# Patient Record
Sex: MALE | Race: WHITE | NOT HISPANIC OR LATINO | Employment: FULL TIME | URBAN - METROPOLITAN AREA
[De-identification: names, ages, dates, MRNs, and addresses within clinical notes are randomized per-mention and may not be internally consistent; named-entity substitution may affect disease eponyms.]

---

## 2017-01-10 ENCOUNTER — PROCEDURE VISIT (OUTPATIENT)
Dept: DERMATOLOGY | Facility: CLINIC | Age: 57
End: 2017-01-10
Payer: COMMERCIAL

## 2017-01-10 VITALS
HEART RATE: 74 BPM | BODY MASS INDEX: 28.93 KG/M2 | WEIGHT: 180 LBS | SYSTOLIC BLOOD PRESSURE: 136 MMHG | DIASTOLIC BLOOD PRESSURE: 85 MMHG | HEIGHT: 66 IN

## 2017-01-10 DIAGNOSIS — C44.311 BASAL CELL CARCINOMA OF NOSE: Primary | ICD-10-CM

## 2017-01-10 PROCEDURE — 17311 MOHS 1 STAGE H/N/HF/G: CPT | Mod: S$GLB,,, | Performed by: DERMATOLOGY

## 2017-01-10 PROCEDURE — 99499 UNLISTED E&M SERVICE: CPT | Mod: S$GLB,,, | Performed by: DERMATOLOGY

## 2017-01-10 PROCEDURE — 17312 MOHS ADDL STAGE: CPT | Mod: S$GLB,,, | Performed by: DERMATOLOGY

## 2017-01-10 NOTE — PROGRESS NOTES
ALLERGIES:  Review of patient's allergies indicates no known allergies.  -------------------------------------------------------------  PROCEDURE: Mohs' Micrographic Surgery    SITE: left ala    INDICATION: basal cell carcinoma in an area at increased risk of recurrence    CASE NUMBER: [id number]      ANESTHETIC: 4 mL 1% Lidocaine with Epinephrine 1:100,000, buffered    SURGICAL PREP: Ethanol and Betadine    SURGEON: Hemanth Armenta MD    ASSISTANTS: Tomasa Vincent CST     PREOPERATIVE DIAGNOSIS: basal cell carcinoma    POSTOPERATIVE DIAGNOSIS: basal cell carcinoma    PATHOLOGIC DIAGNOSIS: basal cell carcinoma    STAGES OF MOHS' SURGERY PERFORMED: two    TUMOR-FREE PLANE ACHIEVED: yes    HEMOSTASIS: Hyfrecation     SPECIMENS: two (one in stage A, one in stage B)    INITIAL LESION SIZE: 0.6 x 0.6 cm    FINAL DEFECT SIZE: 0.6 x 0.8 cm    WOUND REPAIR/DISPOSITION: see below    NARRATIVE:    The patient is a 56 y.o.male referred by Yocasta Nava MD with a history of cancer on the left nasal ala which was biopsied - pathology accession #[id number], [facility]. Findings revealed basal cell carcinoma. Examination revealed a sclerotic plaque on the left nasal ala at the site of prior biopsy, which was confirmed by reference to the photograph taken at the previous patient visit. In light of the nature of this tumor and the location on the nose, Mohs' micrographic surgery was thought to be the most appropriate management choice, and this diagnosis is appropriate for treatment by Mohs' micrographic surgery.  I discussed it with the patient and he fully understands the aims, risks, alternatives, and possible complications, and elects to proceed.  There are no medical or surgical contraindications to the procedure.     A signed informed consent was obtained.    PROCEDURE:  The patient was placed in the semi-recumbent position on the operating table in the Mohs' Surgery Suite. The area in question was thoroughly  "prepped with ethanol and Betadine. A sterile surgical marker was used to outline the clinically apparent margins of the involved area, and a narrow margin of normal-appearing skin. Reference marks were made at the periphery of the outlined area with the surgical marker. The proposed area of excision was measured and photographed. Local anesthesia of 1% Lidocaine with 1:100,000 epinephrine, buffered with sodium bicarbonate, was administered.  The total volume of anesthetic used throughout this portion of the procedure was as documented above. The area was prepared and draped in the standard manner. All of the grossly identifiable area of clinically abnormal tissue and an underlying/peripheral layer was taken and processed by the Mohs' technique.  Hemostasis was obtained with the hyfrecator. Tissue was taken from any areas of residual marginal involvement (if present) and processed by the Mohs' technique in as many stages as needed until a tumor-free plane was achieved.    Colors of inks used in the reference nicks at epidermal margins (if present) and/or inking of non-epithelial edges, if applicable, is represented on the Mohs map as follows: solid lines represent red ink, dots represent blue ink, jagged lines represent black ink, curlicues represent green ink, "xxx" represents yellow ink.    The first Mohs' layer consisted of one section(s) with 4 slide(s) evaluated. Some residual tumor was noted at the margins of the first Mohs' layer. Histology of the specimen(s) showed residual basal cell carcinoma in the deep margin between the green and red nicks, and adjacent to the red nick, as noted on the Mohs map. Missing skin edge was also noted adjacent to the red nick.     The second Mohs' layer consisted of one section(s) with 4 slide(s) evaluated. No residual tumor was noted at the margins of the second Mohs' layer. Histology of the specimen(s) showed chronic solar damage.    A total of one section(s) and 8 slide(s) " were examined under the microscope via the Mohs technique.  A cancer free plane was reached after layer number two. Defect final size was as noted above.     The wound was covered with a nonadherent dressing between stages, and the patient allowed to wait in the waiting area during these periods. The final defect was photographed at the completion of the Mohs' procedure.    The patient was returned to the procedure room following completion of the Mohs' procedure and final slide review. After reviewing the risk and benefits of the alternatives for management of the defect, the patient and I have decided to allow the site to heal by secondary intention.    Final dressing consisted of petrolatum, Telfa and tape.    Estimated blood loss for the total procedure was less than 5 mL.    Total operative time including tissue processing in the Mohs' laboratory and microscopic Mohs' frozen section slide review was 2 hour(s). Verbal and written wound care instructions were given to the patient, and he expressed understanding of these instructions. The patient tolerated the procedure well and left the operating room in good condition; he is to return in 2 weeks for followup.     Dr. Armenta's cell phone number was given to the patient with instructions to call prn with any problems.

## 2017-01-27 ENCOUNTER — OFFICE VISIT (OUTPATIENT)
Dept: DERMATOLOGY | Facility: CLINIC | Age: 57
End: 2017-01-27
Payer: COMMERCIAL

## 2017-01-27 DIAGNOSIS — Z98.890 HISTORY OF MOH'S MICROGRAPHIC SURGERY FOR SKIN CANCER: Primary | ICD-10-CM

## 2017-01-27 DIAGNOSIS — Z85.828 HISTORY OF MOH'S MICROGRAPHIC SURGERY FOR SKIN CANCER: Primary | ICD-10-CM

## 2017-01-27 PROCEDURE — 99024 POSTOP FOLLOW-UP VISIT: CPT | Mod: S$GLB,,, | Performed by: DERMATOLOGY

## 2017-01-27 PROCEDURE — 99999 PR PBB SHADOW E&M-EST. PATIENT-LVL II: CPT | Mod: PBBFAC,,, | Performed by: DERMATOLOGY

## 2017-01-27 NOTE — PROGRESS NOTES
CC: 56 y.o.male patient is here for followup     HPI: Patient is about 2 week(s) s/p Mohs' micrographic surgery, fresh tissue technique, of a basal cell carcinoma on the left nasal ala; with healing via secondary intention  Patient reports no problems    EXAM: Wound appears to be healing well. Base shows good granulation.  No undue erythema to surrounding skin or signs or symptoms of infection.    IMPRESSION:  Healing well post Mohs' micrographic surgery via secondary intention    PLAN:  Site cleaned with peroxide  Dressed with petrolatum  and Telfa and tape  Continue current care  Reviewed anticipated course  Followup 6 weeks if desired; call prn sooner

## 2017-03-21 ENCOUNTER — OFFICE VISIT (OUTPATIENT)
Dept: DERMATOLOGY | Facility: CLINIC | Age: 57
End: 2017-03-21
Payer: COMMERCIAL

## 2017-03-21 DIAGNOSIS — Z85.828 HISTORY OF MOH'S MICROGRAPHIC SURGERY FOR SKIN CANCER: Primary | ICD-10-CM

## 2017-03-21 DIAGNOSIS — Z98.890 HISTORY OF MOH'S MICROGRAPHIC SURGERY FOR SKIN CANCER: Primary | ICD-10-CM

## 2017-03-21 PROCEDURE — 99999 PR PBB SHADOW E&M-EST. PATIENT-LVL II: CPT | Mod: PBBFAC,,, | Performed by: DERMATOLOGY

## 2017-03-21 PROCEDURE — 99024 POSTOP FOLLOW-UP VISIT: CPT | Mod: S$GLB,,, | Performed by: DERMATOLOGY

## 2017-08-28 RX ORDER — ACYCLOVIR 400 MG/1
400 TABLET ORAL 2 TIMES DAILY
Qty: 60 TABLET | Refills: 9 | Status: SHIPPED | OUTPATIENT
Start: 2017-08-28 | End: 2018-08-12 | Stop reason: SDUPTHER

## 2017-11-15 ENCOUNTER — OFFICE VISIT (OUTPATIENT)
Dept: FAMILY MEDICINE | Facility: CLINIC | Age: 57
End: 2017-11-15
Payer: COMMERCIAL

## 2017-11-15 VITALS
HEART RATE: 60 BPM | WEIGHT: 191.81 LBS | OXYGEN SATURATION: 97 % | SYSTOLIC BLOOD PRESSURE: 118 MMHG | DIASTOLIC BLOOD PRESSURE: 90 MMHG | BODY MASS INDEX: 30.82 KG/M2 | HEIGHT: 66 IN

## 2017-11-15 DIAGNOSIS — M25.561 PAIN IN BOTH KNEES, UNSPECIFIED CHRONICITY: ICD-10-CM

## 2017-11-15 DIAGNOSIS — N52.9 ERECTILE DYSFUNCTION, UNSPECIFIED ERECTILE DYSFUNCTION TYPE: ICD-10-CM

## 2017-11-15 DIAGNOSIS — M25.511 CHRONIC PAIN OF BOTH SHOULDERS: ICD-10-CM

## 2017-11-15 DIAGNOSIS — M25.562 PAIN IN BOTH KNEES, UNSPECIFIED CHRONICITY: ICD-10-CM

## 2017-11-15 DIAGNOSIS — Z00.00 PREVENTATIVE HEALTH CARE: Primary | ICD-10-CM

## 2017-11-15 DIAGNOSIS — G89.29 CHRONIC PAIN OF BOTH SHOULDERS: ICD-10-CM

## 2017-11-15 DIAGNOSIS — M25.512 CHRONIC PAIN OF BOTH SHOULDERS: ICD-10-CM

## 2017-11-15 PROCEDURE — 90686 IIV4 VACC NO PRSV 0.5 ML IM: CPT | Mod: S$GLB,,, | Performed by: FAMILY MEDICINE

## 2017-11-15 PROCEDURE — 90471 IMMUNIZATION ADMIN: CPT | Mod: S$GLB,,, | Performed by: FAMILY MEDICINE

## 2017-11-15 PROCEDURE — 99396 PREV VISIT EST AGE 40-64: CPT | Mod: 25,S$GLB,, | Performed by: FAMILY MEDICINE

## 2017-11-15 PROCEDURE — 99999 PR PBB SHADOW E&M-EST. PATIENT-LVL III: CPT | Mod: PBBFAC,,, | Performed by: FAMILY MEDICINE

## 2017-11-15 RX ORDER — SILDENAFIL CITRATE 20 MG/1
20 TABLET ORAL 3 TIMES DAILY
Qty: 30 TABLET | Refills: 5 | Status: SHIPPED | OUTPATIENT
Start: 2017-11-15 | End: 2018-08-29 | Stop reason: SDUPTHER

## 2017-11-15 NOTE — PROGRESS NOTES
Subjective:       Patient ID: Efra Corey is a 57 y.o. male.    Chief Complaint: Preventative health care    Here for routine physical.    C/o bilateral shoulder and knee pain.  Shoulders interfere with sleep.  Shoulder are worse with rotation and abduction    Past Medical History:    Colon polyp - colonoscopy                                                   Past Surgical History:    KNEE ARTHROSCOPY W/ ACL RECONSTRUCTION                       Comment:left; Dr. Rock Alanis    MOUTH SURGERY                                                  No Known Allergies    Social History    Marital Status: Single              Spouse Name:                       Years of Education:                 Number of children: 2             Occupational History  Occupation          Employer            Comment               [employer]                         Social History Main Topics    Smoking Status: Never Smoker                      Smokeless Status: Never Used                        Alcohol Use: Yes                Comment: social    Drug Use: No              Sexual Activity: Not on file          Other Topics            Concern    None on file    Social History Narrative    : wife  of breast cancer      Exercise: regular      From [place], [place]    Current Outpatient Prescriptions on File Prior to Visit:  sildenafil (VIAGRA) 100 MG tablet, Take 1 tablet (100 mg total) by mouth daily as needed for Erectile Dysfunction., Disp: 10 tablet, Rfl: 11  valacyclovir (VALTREX) 500 MG tablet, Take 1 tablet (500 mg total) by mouth once daily., Disp: 30 tablet, Rfl: 11  (DISCONTINUED) meloxicam (MOBIC) 15 MG tablet, Take 1 tablet (15 mg total) by mouth once daily., Disp: 30 tablet, Rfl: 1    No current facility-administered medications on file prior to visit.     No family history on file.                Review of Systems   Constitutional: Negative for appetite change, chills, fever and unexpected weight change.   HENT:  "Negative for sore throat and trouble swallowing.    Eyes: Negative for pain and visual disturbance.   Respiratory: Positive for cough (dry cough for 3 weeks; initially with some fever, rhinorrhea, sneezing). Negative for chest tightness, shortness of breath and wheezing.    Cardiovascular: Negative for chest pain, palpitations and leg swelling.   Gastrointestinal: Negative for abdominal pain, blood in stool, constipation, diarrhea and nausea.   Genitourinary: Negative for difficulty urinating, dysuria and hematuria.   Musculoskeletal: Positive for arthralgias (shoulders and knees). Negative for gait problem and neck pain.   Skin: Negative for rash and wound.   Neurological: Negative for dizziness, weakness, light-headedness and headaches.   Hematological: Negative for adenopathy.   Psychiatric/Behavioral: Negative for dysphoric mood.       Objective:       BP (!) 118/90   Pulse 60   Ht 5' 6" (1.676 m)   Wt 87 kg (191 lb 12.8 oz)   SpO2 97%   BMI 30.96 kg/m²     Physical Exam   Constitutional: He is oriented to person, place, and time. He appears well-developed and well-nourished. He is active. No distress.   HENT:   Head: Normocephalic and atraumatic.   Right Ear: External ear normal.   Left Ear: External ear normal.   Mouth/Throat: Uvula is midline, oropharynx is clear and moist and mucous membranes are normal. No oropharyngeal exudate.   Eyes: Conjunctivae, EOM and lids are normal. Pupils are equal, round, and reactive to light.   Neck: Normal range of motion, full passive range of motion without pain and phonation normal. Neck supple. No thyroid mass and no thyromegaly present.   Cardiovascular: Normal rate, regular rhythm, normal heart sounds and intact distal pulses.  Exam reveals no gallop and no friction rub.    No murmur heard.  Pulmonary/Chest: Effort normal and breath sounds normal. No respiratory distress. He has no wheezes. He has no rales.   Abdominal: Soft. Bowel sounds are normal. He exhibits no " distension and no mass. There is no tenderness. There is no rebound and no guarding.   Musculoskeletal:        Right shoulder: He exhibits decreased range of motion and crepitus. He exhibits no tenderness, no bony tenderness and normal strength.        Left shoulder: He exhibits decreased range of motion and crepitus. He exhibits no tenderness, no bony tenderness and normal strength.        Right knee: He exhibits abnormal patellar mobility (some crepitus). He exhibits normal range of motion.        Left knee: He exhibits decreased range of motion (limited extension). Tenderness found. Medial joint line and lateral joint line tenderness noted.   Lymphadenopathy:     He has no cervical adenopathy.   Neurological: He is alert and oriented to person, place, and time. No cranial nerve deficit. Coordination normal.   Skin: Skin is warm and dry.   Psychiatric: He has a normal mood and affect. His speech is normal and behavior is normal. Judgment and thought content normal.       Results for orders placed or performed in visit on 08/18/16   Comprehensive metabolic panel   Result Value Ref Range    Sodium 142 136 - 145 mmol/L    Potassium 4.3 3.5 - 5.1 mmol/L    Chloride 108 95 - 110 mmol/L    CO2 27 23 - 29 mmol/L    Glucose 87 70 - 110 mg/dL    BUN, Bld 20 6 - 20 mg/dL    Creatinine 1.4 0.5 - 1.4 mg/dL    Calcium 9.5 8.7 - 10.5 mg/dL    Total Protein 7.5 6.0 - 8.4 g/dL    Albumin 4.3 3.5 - 5.2 g/dL    Total Bilirubin 0.6 0.1 - 1.0 mg/dL    Alkaline Phosphatase 75 55 - 135 U/L    AST 23 10 - 40 U/L    ALT 23 10 - 44 U/L    Anion Gap 7 (L) 8 - 16 mmol/L    eGFR if African American >60.0 >60 mL/min/1.73 m^2    eGFR if non  56.2 (A) >60 mL/min/1.73 m^2   Lipid panel   Result Value Ref Range    Cholesterol 183 120 - 199 mg/dL    Triglycerides 75 30 - 150 mg/dL    HDL 47 40 - 75 mg/dL    LDL Cholesterol 121.0 63.0 - 159.0 mg/dL    HDL/Chol Ratio 25.7 20.0 - 50.0 %    Total Cholesterol/HDL Ratio 3.9 2.0 - 5.0     Non-HDL Cholesterol 136 mg/dL   PSA, Screening   Result Value Ref Range    PSA, SCREEN 0.90 0.00 - 4.00 ng/mL       Assessment:       1. Preventative health care    2. Chronic pain of both shoulders    3. Pain in both knees, unspecified chronicity    4. Erectile dysfunction, unspecified erectile dysfunction type        Plan:       Preventative health care  -     Comprehensive metabolic panel; Future; Expected date: 11/15/2017  -     Lipid panel; Future; Expected date: 11/15/2017  -     PSA, Screening; Future; Expected date: 11/15/2017    Chronic pain of both shoulders  -     Ambulatory referral to Orthopedics    Pain in both knees, unspecified chronicity  -     Ambulatory referral to Orthopedics    Erectile dysfunction, unspecified erectile dysfunction type  -     sildenafil (REVATIO) 20 mg Tab; Take 1 tablet (20 mg total) by mouth 3 (three) times daily.  Dispense: 30 tablet; Refill: 5    will get xrays and refer to ortho  He may benefit from PT, but he would like to talk to ortho first  Counseled on pendulum exercises for shoulders and basic knee stretching.  Counseled on regular exercise, maintenance of a healthy weight, balanced diet rich in fruits/vegetables and lean protein, and avoidance of unhealthy habits like smoking and excessive alcohol intake.

## 2017-11-20 ENCOUNTER — HOSPITAL ENCOUNTER (OUTPATIENT)
Dept: RADIOLOGY | Facility: HOSPITAL | Age: 57
Discharge: HOME | End: 2017-11-20
Attending: FAMILY MEDICINE
Payer: COMMERCIAL

## 2017-11-20 DIAGNOSIS — M25.561 PAIN IN BOTH KNEES, UNSPECIFIED CHRONICITY: ICD-10-CM

## 2017-11-20 DIAGNOSIS — M25.511 CHRONIC PAIN OF BOTH SHOULDERS: ICD-10-CM

## 2017-11-20 DIAGNOSIS — M25.562 PAIN IN BOTH KNEES, UNSPECIFIED CHRONICITY: ICD-10-CM

## 2017-11-20 DIAGNOSIS — M25.512 CHRONIC PAIN OF BOTH SHOULDERS: ICD-10-CM

## 2017-11-20 DIAGNOSIS — G89.29 CHRONIC PAIN OF BOTH SHOULDERS: ICD-10-CM

## 2017-11-20 PROCEDURE — 73030 X-RAY EXAM OF SHOULDER: CPT | Mod: 26,LT,, | Performed by: RADIOLOGY

## 2017-11-20 PROCEDURE — 73562 X-RAY EXAM OF KNEE 3: CPT | Mod: 26,59,RT, | Performed by: RADIOLOGY

## 2017-11-20 PROCEDURE — 73562 X-RAY EXAM OF KNEE 3: CPT | Mod: 26,LT,, | Performed by: RADIOLOGY

## 2017-11-20 PROCEDURE — 73030 X-RAY EXAM OF SHOULDER: CPT | Mod: 26,59,RT, | Performed by: RADIOLOGY

## 2017-11-20 PROCEDURE — 73030 X-RAY EXAM OF SHOULDER: CPT | Mod: TC,50,PO

## 2017-11-20 PROCEDURE — 73562 X-RAY EXAM OF KNEE 3: CPT | Mod: TC,50,PO

## 2017-12-11 ENCOUNTER — TELEPHONE (OUTPATIENT)
Dept: FAMILY MEDICINE | Facility: CLINIC | Age: 57
End: 2017-12-11

## 2017-12-11 NOTE — TELEPHONE ENCOUNTER
----- Message from Mary Cabrera sent at 12/11/2017  4:00 PM CST -----  Contact: pt  Pt calling states would the results of labs and xray done on 11/20,please.[phone]

## 2017-12-12 NOTE — TELEPHONE ENCOUNTER
----- Message from Dane Talamantes sent at 12/11/2017  4:26 PM CST -----  Contact: Tristentient  Patient states that he is returning the call and to please call him back [phone].  Thank you

## 2017-12-12 NOTE — TELEPHONE ENCOUNTER
Pt given results of Xray. Pt will sched an appt w/ Dr Alanis . Pt requesting copy of labs be mailed to him. Copies mailed.--lp

## 2018-04-03 ENCOUNTER — OFFICE VISIT (OUTPATIENT)
Dept: DERMATOLOGY | Facility: CLINIC | Age: 58
End: 2018-04-03
Payer: COMMERCIAL

## 2018-04-03 VITALS — WEIGHT: 191 LBS | RESPIRATION RATE: 16 BRPM | BODY MASS INDEX: 30.7 KG/M2 | HEIGHT: 66 IN

## 2018-04-03 DIAGNOSIS — Z85.828 PERSONAL HISTORY OF OTHER MALIGNANT NEOPLASM OF SKIN: Primary | ICD-10-CM

## 2018-04-03 DIAGNOSIS — L82.1 SEBORRHEIC KERATOSIS: ICD-10-CM

## 2018-04-03 DIAGNOSIS — Z12.83 SKIN CANCER SCREENING: ICD-10-CM

## 2018-04-03 DIAGNOSIS — D48.5 NEOPLASM OF UNCERTAIN BEHAVIOR OF SKIN: ICD-10-CM

## 2018-04-03 DIAGNOSIS — L57.0 ACTINIC KERATOSES: ICD-10-CM

## 2018-04-03 PROCEDURE — 11101 PR BIOPSY, EACH ADDED LESION: CPT | Mod: S$GLB,,, | Performed by: DERMATOLOGY

## 2018-04-03 PROCEDURE — 11100 PR BIOPSY OF SKIN LESION: CPT | Mod: 59,S$GLB,, | Performed by: DERMATOLOGY

## 2018-04-03 PROCEDURE — 88305 TISSUE EXAM BY PATHOLOGIST: CPT | Mod: 59 | Performed by: PATHOLOGY

## 2018-04-03 PROCEDURE — 99999 PR PBB SHADOW E&M-EST. PATIENT-LVL III: CPT | Mod: PBBFAC,,, | Performed by: DERMATOLOGY

## 2018-04-03 PROCEDURE — 88305 TISSUE EXAM BY PATHOLOGIST: CPT | Mod: 26,,, | Performed by: PATHOLOGY

## 2018-04-03 PROCEDURE — 99213 OFFICE O/P EST LOW 20 MIN: CPT | Mod: 25,S$GLB,, | Performed by: DERMATOLOGY

## 2018-04-03 PROCEDURE — 17000 DESTRUCT PREMALG LESION: CPT | Mod: S$GLB,,, | Performed by: DERMATOLOGY

## 2018-04-03 PROCEDURE — 17003 DESTRUCT PREMALG LES 2-14: CPT | Mod: S$GLB,,, | Performed by: DERMATOLOGY

## 2018-04-03 NOTE — PROGRESS NOTES
Subjective:       Patient ID:  Efra Corey is a 58 y.o. male who presents for   Chief Complaint   Patient presents with    Follow-up     Last seen 10-27-16   Lesion on chest - few months - never treated        High sun exposure   Phx AK's - cryo tx in past     FINAL PATHOLOGIC DIAGNOSIS - Mohs w/ Dr Armenta 3-21-17   Skin, left ala, shave biopsy  - BASAL CELL CARCINOMA WITH MIXED NODULAR AND INFILTRATIVE GROWTH PATTERN.  - THE LESION IS TRANSECTED AT THE BASE.  MICROSCOPIC DESCRIPTION: Sections show basaloid tumor within the dermis characterized by thin strands and  small nests of basaloid cells infiltrating between dermal collagen bundles. Nodules of basal cell carcinoma are also  noted.  Diagnosed by: Vangie Robbins M.D.  (Electronically Signed: 2016-10-31 12:26:39)        Review of Systems   Skin: Positive for activity-related sunscreen use. Negative for dry skin, daily sunscreen use, sensitivity to antibiotic ointment and sensitivity to bandage adhesive.   Hematologic/Lymphatic: Bruises/bleeds easily.        Objective:    Physical Exam   HENT:   Head:       Skin:                      Diagram Legend     Erythematous scaling macule/papule c/w actinic keratosis       Vascular papule c/w angioma      Pigmented verrucoid papule/plaque c/w seborrheic keratosis      Yellow umbilicated papule c/w sebaceous hyperplasia      Irregularly shaped tan macule c/w lentigo     1-2 mm smooth white papules consistent with Milia      Movable subcutaneous cyst with punctum c/w epidermal inclusion cyst      Subcutaneous movable cyst c/w pilar cyst      Firm pink to brown papule c/w dermatofibroma      Pedunculated fleshy papule(s) c/w skin tag(s)      Evenly pigmented macule c/w junctional nevus     Mildly variegated pigmented, slightly irregular-bordered macule c/w mildly atypical nevus      Flesh colored to evenly pigmented papule c/w intradermal nevus       Pink pearly papule/plaque c/w basal cell carcinoma      Erythematous  hyperkeratotic cursted plaque c/w SCC      Surgical scar with no sign of skin cancer recurrence      Open and closed comedones      Inflammatory papules and pustules      Verrucoid papule consistent consistent with wart     Erythematous eczematous patches and plaques     Dystrophic onycholytic nail with subungual debris c/w onychomycosis     Umbilicated papule    Erythematous-base heme-crusted tan verrucoid plaque consistent with inflamed seborrheic keratosis     Erythematous Silvery Scaling Plaque c/w Psoriasis     See annotation              Assessment / Plan:      Pathology Orders:     Normal Orders This Visit    Tissue Specimen To Pathology, Dermatology     Questions:    Directional Terms:  Other(comment)    Clinical information:  bcc    Specific Site:  right neck    Tissue Specimen To Pathology, Dermatology     Questions:    Directional Terms:  Other(comment)    Clinical information:  bcc    Specific Site:  nasal dorsum        Personal history of other malignant neoplasm of skin  Area(s) of previous NMSC evaluated with no signs of recurrence.    Upper body skin examination performed today including at least 6 points as noted in physical examination. Suspicious lesions noted.      Skin cancer screening  Area(s) of previous NMSC evaluated with no signs of recurrence.    Upper body skin examination performed today including at least 6 points as noted in physical examination. Suspicious lesions noted.    Seborrheic keratosis, chest  These are benign inherited growths without a malignant potential. Reassurance given to patient. No treatment is necessary.       Actinic keratoses  Cryosurgery Procedure Note    Verbal consent from the patient is obtained and the patient is aware of the precancerous quality and need for treatment of these lesions. Liquid nitrogen cryosurgery is applied to the 2 actinic keratoses, as detailed in the physical exam, to produce a freeze injury. The patient is aware that blisters may form  and is instructed on wound care with gentle cleansing and use of vaseline ointment to keep moist until healed. The patient is supplied a handout on cryosurgery and is instructed to call if lesions do not completely resolve. Discussed risk postinflammatory pigmentary changes.       Neoplasm of uncertain behavior of skin  -     Tissue Specimen To Pathology, Dermatology  -     Tissue Specimen To Pathology, Dermatology    If biopsy reveals malignancy, will refer to Dr. Armenta for Mohs surgery consultation.        Shave biopsy procedure note:    Shave biopsy performed after verbal consent including risk of infection, scar, recurrence, need for additional treatment of site. Area prepped with alcohol, anesthetized with approximately 1.0cc of 1% lidocaine with epinephrine. Lesional tissue shaved with razor blade. Hemostasis achieved with application of aluminum chloride followed by hyfrecation. No complications. Dressing applied. Wound care explained.                 Follow-up in about 6 months (around 10/3/2018).

## 2018-04-04 ENCOUNTER — TELEPHONE (OUTPATIENT)
Dept: DERMATOLOGY | Facility: CLINIC | Age: 58
End: 2018-04-04

## 2018-04-04 NOTE — TELEPHONE ENCOUNTER
Spoke with patient and states concerned about blister forming where cryosurgery was performed to hand. Instructed to leave blister in place as it is natures bandaid.  Continue vaseline and bandaid and call if has any questions.  Verbalized understanding.

## 2018-04-04 NOTE — TELEPHONE ENCOUNTER
----- Message from Marielle Jeter sent at 4/4/2018  3:23 PM CDT -----  Patient states he had a procedure yesterday & surgery site has a blister, please call to advise, [phone]

## 2018-04-10 ENCOUNTER — TELEPHONE (OUTPATIENT)
Dept: DERMATOLOGY | Facility: CLINIC | Age: 58
End: 2018-04-10

## 2018-04-10 NOTE — TELEPHONE ENCOUNTER
4-11-18 Called patient with path report and he is scheduled for a consult with Dr. Armenta on 4-11-18

## 2018-04-10 NOTE — TELEPHONE ENCOUNTER
----- Message from Yocasta Nava MD sent at 4/10/2018  5:38 AM CDT -----  Please call pt with results and schedule consultation with Dr. Armenta for Mohs of BOTH lesions  on [place]. Thank you.       FINAL PATHOLOGIC DIAGNOSIS  1. Skin, right neck, shave biopsy:  - BASAL CELL CARCINOMA WITH NODULAR GROWTH PATTERN.  - LESION IS TRANSECTED AT THE BASE.  MICROSCOPIC DESCRIPTION: Sections shows a nodular tumor composed of basaloid cells exhibiting peripheral  palisading, retraction artifact and apoptosis.  2. Skin, nasal dorsum, shave biopsy:  - BASAL CELL CARCINOMA WITH NODULAR GROWTH PATTERN.  - LESION IS TRANSECTED AT THE BASE.  MICROSCOPIC DESCRIPTION: Sections shows a nodular tumor composed of basaloid cells exhibiting peripheral  palisading, retraction artifact and apoptosis.  Diagnosed by: Vangie Robbins M.D.  (Electronically Signed: 2018-04-09 13:02:26)  Gross Description  Clinic # [phone]  Received in 2 parts  Part 1  Received in formalin, labeled right neck, is a 0.5 x 0.5 cm shave biopsy

## 2018-04-11 ENCOUNTER — INITIAL CONSULT (OUTPATIENT)
Dept: DERMATOLOGY | Facility: CLINIC | Age: 58
End: 2018-04-11
Payer: COMMERCIAL

## 2018-04-11 VITALS
WEIGHT: 190 LBS | HEART RATE: 67 BPM | SYSTOLIC BLOOD PRESSURE: 127 MMHG | DIASTOLIC BLOOD PRESSURE: 77 MMHG | BODY MASS INDEX: 30.53 KG/M2 | HEIGHT: 66 IN

## 2018-04-11 DIAGNOSIS — C44.311 BASAL CELL CARCINOMA OF NOSE: Primary | ICD-10-CM

## 2018-04-11 DIAGNOSIS — C44.41 BASAL CELL CARCINOMA OF NECK: ICD-10-CM

## 2018-04-11 PROCEDURE — 99214 OFFICE O/P EST MOD 30 MIN: CPT | Mod: 24,S$GLB,, | Performed by: DERMATOLOGY

## 2018-04-11 PROCEDURE — 99999 PR PBB SHADOW E&M-EST. PATIENT-LVL III: CPT | Mod: PBBFAC,,, | Performed by: DERMATOLOGY

## 2018-04-11 NOTE — LETTER
April 12, 2018      Yocasta Nava MD  [address] [facility] [address] [place] [address]           [facility]  [address] [facility] [address] [place] [address]  Phone: [phone]          Patient: Efra Croey   MR Number: [id number]   YOB: 1960   Date of Visit: 4/11/2018       Dear Dr. Yocasta Nava:    Thank you for referring Efra Corey to me for evaluation. Attached you will find relevant portions of my assessment and plan of care.    If you have questions, please do not hesitate to call me. I look forward to following Efra Corey along with you.    Sincerely,    Hemanth Armenta MD    Enclosure  CC:  No Recipients    If you would like to receive this communication electronically, please contact [email or web address] or [phone] to request more information on [facility] [employer] access.    For providers and/or their staff who would like to refer a patient to [facility], please contact us through our one-stop-shop provider referral line, Lesley Lezama, at [phone].    If you feel you have received this communication in error or would no longer like to receive these types of communications, please e-mail [email or web address]

## 2018-04-11 NOTE — PROGRESS NOTES
ALLERGIES:  Patient has no known allergies.    CHIEF COMPLAINT:  This 58 y.o. male comes for evaluation for Mohs' Micrographic Surgery, Fresh Tissue Technique, for treatment of a biopsy-proven Basal Cell Carcinoma on the right neck and of a biopsy-proven Basal Cell Carcinoma  on the nasal dorsum. Consultation requested by Yocasta Easton.    HISTORY OF PRESENT ILLNESS:   Location(s): right neck and nasal dorsum  Duration: 1 year or more  Quality: persistent   Context: status post biopsies by Yocasta Nava M.D.; path = Basal Cell Carcinoma on the right neck and Basal Cell Carcinmona on the nasal dorsum; pathology accession #[id number], [facility]   Prior Treatment: none  See also the handwritten notes/diagrams scanned to chart for additional details.    Defibrillator: No  Pacemaker: No  Artificial heart valves: No  Artificial joints: No    REVIEW OF SYSTEMS:   General: general health good  Skin: has previous history of skin cancer(s); left nose basal cell carcinoma; status post Mohs surgery with secondary healing  CV: has no hypertension, no artificial valves, has no chest pain  Resp: has no shortness of breath  Endo: has no diabetes  Hem/Lymph: not taking prescribed anticoagulants, has no easy bruising/bleeding  Allergy/Immuno: has no allergies as noted above  GI: has no history of hepatitis  MS: as noted above     PAST MEDICAL HISTORY:  Past Medical History:   Diagnosis Date    Colon polyp        PAST SURGICAL HISTORY:  Past Surgical History:   Procedure Laterality Date    KNEE ARTHROSCOPY W/ ACL RECONSTRUCTION  2006    left; Dr. Rock Alanis    MOUTH SURGERY          SOCIAL HISTORY:  Dependencies: smoking status as noted below  Social History   Substance Use Topics    Smoking status: Never Smoker    Smokeless tobacco: Never Used    Alcohol use Yes      Comment: social       PERTINENT MEDICATIONS:  See medications list.    Current Outpatient Prescriptions on File Prior to Visit   Medication Sig  Dispense Refill    acyclovir (ZOVIRAX) 400 MG tablet TAKE 1 TABLET (400 MG TOTAL) BY MOUTH 2 (TWO) TIMES DAILY. 60 tablet 9    sildenafil (REVATIO) 20 mg Tab Take 1 tablet (20 mg total) by mouth 3 (three) times daily. 30 tablet 5     No current facility-administered medications on file prior to visit.        ALLERGIES:  Patient has no known allergies.    EXAM:  See also the handwritten notes/diagrams scanned to chart for additional details.  Constitutional  General appearance: well-developed, well-nourished, well-kempt white male    Eyes  Inspection of conjunctivae and lids reveals no abnormalities; sclerae anicteric  Neurologic/Psychiatric  Alert,  normal orientation to time, place, person  Normal mood and affect with no evidence of depression, anxiety, agitation  Skin: see photo(s)  Head: background moderate solar damage to exposed areas of skin; in addition, inspection/palpation reveals an approximately 4-5 mm eschar on the nose tip; site(s) confirmed by reference to the photograph(s) in the chart taken at the time of the biopsy/biopsies by the referring physician  Neck: examination reveals moderate chronic solar damage; in addition, inspection/palpation reveals an ill-defined, approximately 1.5 x 2.5 cm pink plaque on the right submandibular neck; site(s) confirmed by reference to the photograph(s) in the chart taken at the time of the biopsy/biopsies by the referring physician  Right upper extremity: examination reveals moderate chronic solar damage  Left upper extremity: examination reveals moderate chronic solar damage    ASSESSMENT: biopsy-proven basal cell carcinoma of the nose tip  biopsy-proven basal cell carcinoma  of the right neck, greater than 2 cm diameter  chronic solar damage to areas as noted above  personal history of non-melanoma skin cancer    PLAN:  The diagnoses and management options, and risks and benefits of the alternatives, including observation/non-treatment, radiation treatment,  excision with vertical frozen section or paraffin-embedded section margin evaluation, and Mohs' Micrographic Surgery, Fresh Tissue Technique, were discussed at length with the patient. In particular, the discussion included, but was not limited to, the following:    One alternative at this point would be to defer further treatment and observe the lesions. With small skin cancers of this kind, it is possible that a biopsy can be sufficient to definitively treat a small skin cancer of this kind. Alternatively, some skin cancers are slow growing and do not require immediate treatment. The potential advantage of this choice would be to avoid the need for possibly unnecessary additional surgery. Among the potential disadvantages of this would be the possibility of enlargement of the lesions, more extensive spread of the lesions or recurrence at a later date, which might necessitate larger and more complex surgeries.    Radiation treatment can be an effective treatment for these types of skin cancer. The usual course of treatment is every weekday for several weeks. Local irritation will result from treatment, although no systemic side effects are expected. The potential advantage of radiation treatment is that it avoids the need for surgery. Among the disadvantages of radiation treatment are the length of treatment, the local inflammatory response, the absence of pathologic confirmation of the removal of the skin cancer, a possible increased risk of additional skin cancer in the treated area in later years, and a somewhat increased risk of recurrence at a later date.     Excisional surgery can be an effective treatment for these types of skin cancer. This would involve excision of the lesions with margin evaluation by submitting the specimens to a pathologist for either immediate marginal assessment via frozen section processing, or delayed marginal assessment by fixed-tissue processing. The potential advantage of this  technique is that it offers a way of treating the lesions with some degree of histologic confirmation of tumor removal. Among the disadvantages of this treatment are the possible need for re-excision if marginal involvement is identified, a somewhat greater likelihood of recurrence as compared to Mohs' surgery because of the less comprehensive margin evaluation inherent in the technique, and the general potential risks of surgery, including allergic reactions to the anesthetic and other materials used, infection, injury to nerves in the area with consequent loss of sensation or muscle function, and scarring or distortion of surrounding structures.    Mohs' surgery is a very effective treatment for these types of skin cancer. The potential advantage of Mohs' surgery is that this technique offers the greatest possible certainty of knowing that the skin cancer has been completely removed, with the removal of the least amount of normal tissue. The potential disadvantages of Mohs' surgery include the duration of the surgery, the possible need for a separate surgery for reconstruction following tumor removal, and scarring as a result. In addition, general potential risks of surgery as noted above also apply to treatment via Mohs' surgery.    In light of the size and nature of these tumors and the locations on the nose and neck in areas of increased risk of recurrence, Mohs' micrographic surgery was thought to be the most appropriate management choice, and these diagnoses are appropriate for treatment by Mohs' micrographic surgery.     All questions were answered to his satisfaction. He fully understands the aims, risks, alternatives, and possible complications, and has elected to proceed with the surgery, and verbally consented to do so. The procedure will be scheduled in the near future.    Routine pre-op instructions were given to him.    --------------------------------------  Note: Some or all of this note may have  been generated using voice recognition software. There may be voice recognition errors including grammatical and/or spelling errors found in the text. Attempts were made to correct these errors prior to signature.

## 2018-05-31 NOTE — PROGRESS NOTES
ALLERGIES:   Patient has no known allergies.      Current Outpatient Prescriptions:     acyclovir (ZOVIRAX) 400 MG tablet, TAKE 1 TABLET (400 MG TOTAL) BY MOUTH 2 (TWO) TIMES DAILY., Disp: 60 tablet, Rfl: 9    sildenafil (REVATIO) 20 mg Tab, Take 1 tablet (20 mg total) by mouth 3 (three) times daily., Disp: 30 tablet, Rfl: 5  -------------------------------------------------------------  PROCEDURE: Mohs' Micrographic Surgery    SITE: nose dorsum    INDICATION: basal cell carcinoma in an area at increased risk of recurrence    CASE NUMBER: [id number]      ANESTHETIC: 2.5 mL 2% Lidocaine with Epinephrine 1:100,000    SURGICAL PREP: Ethanol and Hibiclens    SURGEON: Hemanth Armenta MD    ASSISTANTS: Anam King CST     PREOPERATIVE DIAGNOSIS: basal cell carcinoma    POSTOPERATIVE DIAGNOSIS: basal cell carcinoma    PATHOLOGIC DIAGNOSIS: basal cell carcinoma    STAGES OF MOHS' SURGERY PERFORMED: one    TUMOR-FREE PLANE ACHIEVED: yes    HEMOSTASIS: Hyfrecation     SPECIMENS: one (one in stage A)    INITIAL LESION SIZE: 1.0 x 1.0 cm    FINAL DEFECT SIZE: 1.0 x 1.1 cm    WOUND REPAIR/DISPOSITION: see below    NARRATIVE:    The patient is a 58 y.o.male referred by Yocasta Nava MD with a history of cancer on the nose which was biopsied - pathology accession #[id number], [facility]. Findings revealed basal cell carcinoma. Examination revealed a depressed scar on the lower nose dorsum at the site of prior biopsy, which was confirmed by reference to the photograph taken at the previous patient visit. In light of the nature of this tumor and the location on the nose, Mohs' micrographic surgery was thought to be the most appropriate management choice, and this diagnosis is appropriate for treatment by Mohs' micrographic surgery.  I discussed it with the patient and he fully understands the aims, risks, alternatives, and possible complications, and elects to proceed.  There are no medical or surgical  "contraindications to the procedure.     A signed informed consent was obtained.    PROCEDURE:  The patient was placed in the semi-recumbent position on the operating table in the Mohs' Surgery Suite. The area in question was thoroughly prepped with ethanol and Hibiclens, with particular care to avoid application to the immediate periocular skin. A sterile surgical marker was used to outline the clinically apparent margins of the involved area, and a narrow margin of normal-appearing skin. Reference marks were made at the periphery of the outlined area with the surgical marker. The proposed area of excision was measured and photographed. Local anesthesia as noted above was administered.  The total volume of anesthetic used throughout this portion of the procedure was as documented above. The area was prepared and draped in the standard manner. All of the grossly identifiable area of clinically abnormal tissue and an underlying/peripheral layer was taken and processed by the Mohs' technique.  Hemostasis was obtained with the hyfrecator. Tissue was taken from any areas of residual marginal involvement (if present) and processed by the Mohs' technique in as many stages as needed until a tumor-free plane was achieved.    Colors of inks used in the reference nicks at epidermal margins (if present) and/or inking of non-epithelial edges, if applicable, is represented on the Mohs map as follows: solid lines represent red ink, dots represent blue ink, jagged lines represent black ink, curlicues represent green ink, "xxx" represents yellow ink.    The first Mohs' layer consisted of one section(s) with 3 slide(s) evaluated. No residual tumor was noted at the margins of the first Mohs' layer. Histology of the specimen(s) showed changes consistent with chronic solar damage.     A total of one section(s) and 3 slide(s) were examined under the microscope via the Mohs technique.  A cancer free plane was reached after layer number " one. Defect final size was as noted above.     The wound was covered with a nonadherent dressing between stages, and the patient allowed to wait in the waiting area during these periods. The final defect was photographed at the completion of the Mohs' procedure.    See the separate procedure note which follows regarding repair of the defect following Mohs' surgery.     -----------------------------------------------    REPAIR FOLLOWING MOHS' MICROGRAPHIC SURGERY    PREOPERATIVE DIAGNOSIS: defect following Mohs' surgery for a basal cell carcinoma    POSTOPERATIVE DIAGNOSIS: same    PROCEDURE PERFORMED: complex linear closure     ANESTHETIC: 3 mL 2% Lidocaine with Epinephrine 1:100,000     SURGICAL PREP: Hibiclens    SURGEON: Hemanth Armenta MD     ASSISTANTS: as above    LOCATION: nose dorsum      INDICATIONS:  Earlier in the day, the patient underwent Mohs' micrographic surgical excision of a basal cell carcinoma on the nose dorsum. Tumor free margins were achieved after layer number one.  Later in the day, the management of the resulting wound was addressed with the patient. I discussed the various wound management options with the patient and he fully understands the aims, risks, alternatives, and possible complications of the alternatives, and he elects to proceed with closure of the defect in the manner noted below.  There are no medical or surgical contraindications to the procedure.    A signed informed consent was previously obtained.    PROCEDURE:  Repair via complex closure:  The patient was returned to the procedure room following completion of the Mohs' procedure and final slide review. Because of the size, shape and location of the defect, simple closure could not be achieved without possible distortion of surrounding structures, excessive tension on the wound margins and an unacceptable risk of wound dehiscence, and the creation of standing cone deformities. Consideration was given to the site of the  wound, the surrounding structures, and the orientation of closure necessary to provide the optimal functional and cosmetic outcome. After devoting time to these considerations, and to the orientation of the vectors of maximal skin tension surrounding the defect, the area was prepped again and a fusiform closure was outlined on the skin surrounding the defect with a sterile surgical marker, to minimize tension across the wound. Additional anesthetic was infiltrated into the tissues surrounding the defect and the anticipated area of repair, to maintain anesthesia during the procedure. Preparation of the site for closure was then carried out by extending the defect through excision of triangles of superfluous tissue on either side of the wound to square the shoulders of the defect and to allow closure without distortion by standing cone deformities, creating a fusiform defect measuring 1.0 x 4.0 cm in size.  Wound margins were extensively undermined to allow advancement of the wound margins into the defect and to permit closure with minimal tension. After hemostasis was achieved with the hyfrecator, closure was accomplished with:      multiple #4-0 buried interrupted Vicryl suture(s) and    multiple #4-0 simple interrupted and vertical mattress Prolene suture(s) for final approximation of the wound margins.    Total length of the final closure was 4.0 cm.     The site was photographed following completion of the repair. Final dressing consisted of petrolatum, Telfa and tape.    Estimated blood loss for the total procedure was less than 5 mL.    Total operative time including tissue processing in the Mohs' laboratory and microscopic Mohs' frozen section slide review was 3 hour(s). Verbal and written wound care instructions were given to the patient, and he expressed understanding of these instructions. The patient tolerated the procedure well and left the operating room in good condition; he is to return in 7 days for  suture removal.     Dr. Armenta's cell phone number was given to the patient with instructions to call prn with any problems.

## 2018-06-01 ENCOUNTER — PROCEDURE VISIT (OUTPATIENT)
Dept: DERMATOLOGY | Facility: CLINIC | Age: 58
End: 2018-06-01
Payer: COMMERCIAL

## 2018-06-01 VITALS
HEIGHT: 66 IN | WEIGHT: 185 LBS | BODY MASS INDEX: 29.73 KG/M2 | DIASTOLIC BLOOD PRESSURE: 96 MMHG | SYSTOLIC BLOOD PRESSURE: 145 MMHG | HEART RATE: 60 BPM

## 2018-06-01 DIAGNOSIS — C44.311 BASAL CELL CARCINOMA OF NOSE: Primary | ICD-10-CM

## 2018-06-01 PROCEDURE — 99499 UNLISTED E&M SERVICE: CPT | Mod: S$GLB,,, | Performed by: DERMATOLOGY

## 2018-06-01 PROCEDURE — 17311 MOHS 1 STAGE H/N/HF/G: CPT | Mod: S$GLB,,, | Performed by: DERMATOLOGY

## 2018-06-01 PROCEDURE — 13152 CMPLX RPR E/N/E/L 2.6-7.5 CM: CPT | Mod: 51,S$GLB,, | Performed by: DERMATOLOGY

## 2018-06-08 ENCOUNTER — OFFICE VISIT (OUTPATIENT)
Dept: DERMATOLOGY | Facility: CLINIC | Age: 58
End: 2018-06-08
Payer: COMMERCIAL

## 2018-06-08 DIAGNOSIS — Z48.02 VISIT FOR SUTURE REMOVAL: Primary | ICD-10-CM

## 2018-06-08 PROCEDURE — 99999 PR PBB SHADOW E&M-EST. PATIENT-LVL I: CPT | Mod: PBBFAC,,, | Performed by: DERMATOLOGY

## 2018-06-08 PROCEDURE — 99024 POSTOP FOLLOW-UP VISIT: CPT | Mod: S$GLB,,, | Performed by: DERMATOLOGY

## 2018-06-08 NOTE — PROGRESS NOTES
CC: 58 y.o.male patient is here for suture removal.     HPI: Patient is one week(s) s/p Mohs' micrographic surgery, fresh tissue technique of a basal cell carcinoma on the nose, with subsequent repair via linear closure  Patient reports no problems.    EXAM:  Sutures intact.  Wound healing well.  Good approximation of skin edges.  No undue erythema to surrounding skin or signs or symptoms of infection.  There are two 3-5 mm areas of superficial erosion to the center of the suture line    IMPRESSION:  Healing well post Mohs' micrographic surgery and repair    PLAN:  Site cleaned with peroxide, sutures removed  Dressed with petrolatum   Reviewed further care and expected course  Followup 6 weeks; call prn sooner

## 2018-07-18 ENCOUNTER — OFFICE VISIT (OUTPATIENT)
Dept: DERMATOLOGY | Facility: CLINIC | Age: 58
End: 2018-07-18
Payer: COMMERCIAL

## 2018-07-18 DIAGNOSIS — Z85.828 HISTORY OF MOH'S MICROGRAPHIC SURGERY FOR SKIN CANCER: Primary | ICD-10-CM

## 2018-07-18 DIAGNOSIS — Z98.890 HISTORY OF MOH'S MICROGRAPHIC SURGERY FOR SKIN CANCER: Primary | ICD-10-CM

## 2018-07-18 PROCEDURE — 99024 POSTOP FOLLOW-UP VISIT: CPT | Mod: S$GLB,,, | Performed by: DERMATOLOGY

## 2018-07-18 PROCEDURE — 99999 PR PBB SHADOW E&M-EST. PATIENT-LVL II: CPT | Mod: PBBFAC,,, | Performed by: DERMATOLOGY

## 2018-07-18 NOTE — PROGRESS NOTES
CC: 58 y.o.male patient is here for followup     HPI: Patient is 6 week(s) s/p Mohs' micrographic surgery, fresh tissue technique, of a basal cell carcinoma on the nose; with subsequent repair by linear closure  Patient reports some scarring, lumpiness; no real problems    ROS:  Skin: still pending surgery right neck; prior path showed bcc; see photo below    EXAM: Site on nose appears well healed. Some residual erythema as anticipated; and the lower portion of the scar is somewhat spread and sclerotic  No change to the right neck site; see below      IMPRESSION: Well healed post Mohs' micrographic surgery, nose  Pending treatment to basal cell carcinoma, right neck    PLAN:  Discussed anticipated course to site on nose  Expect gradual cosmetic improvement  Will schedule for Mohs surgery to right neck site in October at his request

## 2018-08-03 ENCOUNTER — TELEPHONE (OUTPATIENT)
Dept: DERMATOLOGY | Facility: CLINIC | Age: 58
End: 2018-08-03

## 2018-08-12 RX ORDER — ACYCLOVIR 400 MG/1
400 TABLET ORAL 2 TIMES DAILY
Qty: 60 TABLET | Refills: 9 | Status: SHIPPED | OUTPATIENT
Start: 2018-08-12 | End: 2019-08-23 | Stop reason: SDUPTHER

## 2018-08-29 DIAGNOSIS — N52.9 ERECTILE DYSFUNCTION, UNSPECIFIED ERECTILE DYSFUNCTION TYPE: ICD-10-CM

## 2018-08-30 RX ORDER — SILDENAFIL CITRATE 20 MG/1
TABLET ORAL
Qty: 30 TABLET | Refills: 5 | Status: SHIPPED | OUTPATIENT
Start: 2018-08-30 | End: 2019-04-23 | Stop reason: SDUPTHER

## 2018-09-28 ENCOUNTER — PROCEDURE VISIT (OUTPATIENT)
Dept: DERMATOLOGY | Facility: CLINIC | Age: 58
End: 2018-09-28
Payer: COMMERCIAL

## 2018-09-28 VITALS
WEIGHT: 190 LBS | HEIGHT: 66 IN | BODY MASS INDEX: 30.53 KG/M2 | DIASTOLIC BLOOD PRESSURE: 95 MMHG | HEART RATE: 63 BPM | SYSTOLIC BLOOD PRESSURE: 144 MMHG

## 2018-09-28 DIAGNOSIS — C44.41 BASAL CELL CARCINOMA OF NECK: Primary | ICD-10-CM

## 2018-09-28 PROCEDURE — 17311 MOHS 1 STAGE H/N/HF/G: CPT | Mod: S$GLB,,, | Performed by: DERMATOLOGY

## 2018-09-28 PROCEDURE — 17315 MOHS SURG ADDL BLOCK: CPT | Mod: S$GLB,,, | Performed by: DERMATOLOGY

## 2018-09-28 PROCEDURE — 99499 UNLISTED E&M SERVICE: CPT | Mod: S$GLB,,, | Performed by: DERMATOLOGY

## 2018-09-28 PROCEDURE — 12044 INTMD RPR N-HF/GENIT7.6-12.5: CPT | Mod: 51,S$GLB,, | Performed by: DERMATOLOGY

## 2018-09-28 RX ORDER — HYDROCODONE BITARTRATE AND ACETAMINOPHEN 5; 325 MG/1; MG/1
TABLET ORAL
Qty: 12 TABLET | Refills: 0
Start: 2018-09-28 | End: 2019-08-27

## 2018-09-28 NOTE — PROGRESS NOTES
ALLERGIES:   Patient has no known allergies.      Current Outpatient Medications:     acyclovir (ZOVIRAX) 400 MG tablet, TAKE 1 TABLET (400 MG TOTAL) BY MOUTH 2 (TWO) TIMES DAILY., Disp: 60 tablet, Rfl: 9    sildenafil (REVATIO) 20 mg Tab, TAKE 1 TABLET 3 TIMES A DAY, Disp: 30 tablet, Rfl: 5  -------------------------------------------------------------  PROCEDURE: Mohs' Micrographic Surgery    SITE: right neck    INDICATION: basal cell carcinoma, greater than 2 cm    CASE NUMBER: [id number]      ANESTHETIC: 8.5 mL 1% Lidocaine with Epinephrine 1:100,000    SURGICAL PREP: Ethanol and Hibiclens    SURGEON: Hemanth Armenta MD    ASSISTANTS: Anam King CST     PREOPERATIVE DIAGNOSIS: basal cell carcinoma    POSTOPERATIVE DIAGNOSIS: basal cell carcinoma    PATHOLOGIC DIAGNOSIS: basal cell carcinoma    STAGES OF MOHS' SURGERY PERFORMED: one    TUMOR-FREE PLANE ACHIEVED: yes    HEMOSTASIS: Hyfrecation     SPECIMENS: six (six in stage A)    INITIAL LESION SIZE: 2.6 x 4.5 cm    FINAL DEFECT SIZE: 3.2 x 4.5 cm    WOUND REPAIR/DISPOSITION: see below    NARRATIVE:    The patient is a 58 y.o.male referred by Yocasta Nava MD with a history of cancer on the left submandibular neck which was biopsied - pathology accession #[id number], [facility]. Findings revealed basal cell carcinoma. Examination revealed an ill-defined, greater than 2 cm pearly plaque on the right submandibular neck at the site of prior biopsy, which was confirmed by reference to the photograph taken at the previous patient visit. In light of the size greater than 2 cm and nature of this tumor and the location on the neck, Mohs' micrographic surgery was thought to be the most appropriate management choice, and this diagnosis is appropriate for treatment by Mohs' micrographic surgery.  I discussed it with the patient and he fully understands the aims, risks, alternatives, and possible complications, and elects to proceed.  There are no  "medical or surgical contraindications to the procedure.     A signed informed consent was obtained.    PROCEDURE:  The patient was placed in the left lateral decubitus position on the operating table in the Mohs' Surgery Suite. The area in question was thoroughly prepped with ethanol and Hibiclens, with particular care to avoid application to the immediate periocular skin or introduction into the external auditory meatus. A sterile surgical marker was used to outline the clinically apparent margins of the involved area, and a narrow margin of normal-appearing skin. Reference marks were made at the periphery of the outlined area with the surgical marker. The proposed area of excision was measured and photographed. Local anesthesia as noted above was administered.  The total volume of anesthetic used throughout this portion of the procedure was as documented above. The area was prepared and draped in the standard manner. All of the grossly identifiable area of clinically abnormal tissue and an underlying/peripheral layer was taken and processed by the Mohs' technique.  Hemostasis was obtained with the hyfrecator. Tissue was taken from any areas of residual marginal involvement (if present) and processed by the Mohs' technique in as many stages as needed until a tumor-free plane was achieved.    Colors of inks used in the reference nicks at epidermal margins (if present) and/or inking of non-epithelial edges, if applicable, is represented on the Mohs map as follows: solid lines represent red ink, dots represent blue ink, jagged lines represent black ink, curlicues represent green ink, "xxx" represents yellow ink.    The first Mohs' layer consisted of six section(s) with 14 slide(s) evaluated. Histology of the specimen(s) clear margins on the initial cuts; on recuts of section six, some tumor was present on recuts from section 6, as noted on the Mohs map, although multiple earlier cuts were clear. Actual surgical " margins were assessed as clear.    A total of six section(s) and 14 slide(s) were examined under the microscope via the Mohs technique.  A cancer free plane was reached after layer number one. Defect final size was as noted above.     The wound was covered with a nonadherent dressing between stages, and the patient allowed to wait in the waiting area during these periods. The final defect was photographed at the completion of the Mohs' procedure.    See the separate procedure note which follows regarding repair of the defect following Mohs' surgery.     -----------------------------------------------    REPAIR FOLLOWING MOHS' MICROGRAPHIC SURGERY    PREOPERATIVE DIAGNOSIS: defect following Mohs' surgery for a basal cell carcinoma    POSTOPERATIVE DIAGNOSIS: same    PROCEDURE PERFORMED: intermediate (layered) closure     ANESTHETIC: 9.5 mL 1% Lidocaine with Epinephrine 1:100,000     SURGICAL PREP: Hibiclens    SURGEON: Hemanth Armenta MD     ASSISTANTS: as above    LOCATION: right neck       INDICATIONS:  Earlier in the day, the patient underwent Mohs' micrographic surgical excision of a basal cell carcinoma on the right neck. Tumor free margins were achieved after layer number one.  Later in the day, the management of the resulting wound was addressed with the patient. I discussed the various wound management options with the patient and he fully understands the aims, risks, alternatives, and possible complications of the alternatives, and he elects to proceed with closure of the defect in the manner noted below.  There are no medical or surgical contraindications to the procedure.    A signed informed consent was previously obtained.    PROCEDURE:  Repair via intermediate closure:  The patient was returned to the procedure room following completion of the Mohs' procedure and final slide review. Because of the size, shape and location of the defect, simple closure could not be achieved without excessive tension  on the wound margins and an unacceptable risk of wound dehiscence and standing cone deformities. After surgical prepping, additional anesthetic was infiltrated into the tissues surrounding the defect and the anticipated area of repair, to maintain anesthesia during the procedure. Preparation of the site was carried out by extending the defect through excision of small triangles of superfluous tissue on either side of the wound to square the shoulders of the defect and to allow closure without distortion by standing cone deformities, creating a fusiform defect measuring 3.2 x 9.0 cm in size. Wound margins were minimally undermined to allow closure with minimal tension. After hemostasis was achieved with the hyfrecator, closure was accomplished in layered fashion with:      multiple #4-0 buried interrupted Vicryl suture(s) and    multiple #4-0 simple interrupted and vertical mattress Prolene suture(s) and    two #4-0 running locked Prolene suture(s) for final approximation of the wound margins.    Total length of the final closure was 9.0 cm.     The site was photographed following completion of the repair. Final dressing consisted of petrolatum, Telfa and tape.    Estimated blood loss for the total procedure was less than 10 mL.    Total operative time including tissue processing in the Mohs' laboratory and microscopic Mohs' frozen section slide review was 3 hour(s). Verbal and written wound care instructions were given to the patient, and he expressed understanding of these instructions. The patient tolerated the procedure well and left the operating room in good condition; he is to return in 7 days for suture removal.     Dr. Armenta's cell phone number was given to the patient with instructions to call prn with any problems.

## 2018-10-05 ENCOUNTER — TELEPHONE (OUTPATIENT)
Dept: GASTROENTEROLOGY | Facility: CLINIC | Age: 58
End: 2018-10-05

## 2018-10-05 ENCOUNTER — CLINICAL SUPPORT (OUTPATIENT)
Dept: DERMATOLOGY | Facility: CLINIC | Age: 58
End: 2018-10-05
Payer: COMMERCIAL

## 2018-10-05 PROCEDURE — 99999 PR PBB SHADOW E&M-EST. PATIENT-LVL II: CPT | Mod: PBBFAC,,,

## 2018-10-05 NOTE — PROGRESS NOTES
CC: 58 y.o.male patient is here for suture removal.     HPI: Patient is one week(s) s/p Mohs' micrographic surgery, fresh tissue technique of a Basal Cell Carcinoma on the neck, with subsequent repair   Patient reports no problems.    EXAM:  Sutures intact.  Wound healing well.  Good approximation of skin edges.  No undue erythema to surrounding skin or signs or symptoms of infection.    IMPRESSION:  Healing well post Mohs' micrographic surgery and repair    PLAN:  Site cleaned with peroxide, sutures removed  Dressed with petrolatum   Reviewed further care and expected course  Followup 6 weeks; call prn sooner

## 2018-11-05 ENCOUNTER — ANESTHESIA (OUTPATIENT)
Dept: ENDOSCOPY | Facility: HOSPITAL | Age: 58
End: 2018-11-05
Payer: COMMERCIAL

## 2018-11-05 ENCOUNTER — ANESTHESIA EVENT (OUTPATIENT)
Dept: ENDOSCOPY | Facility: HOSPITAL | Age: 58
End: 2018-11-05
Payer: COMMERCIAL

## 2018-11-05 ENCOUNTER — HOSPITAL ENCOUNTER (OUTPATIENT)
Facility: HOSPITAL | Age: 58
Discharge: HOME | End: 2018-11-05
Attending: INTERNAL MEDICINE | Admitting: INTERNAL MEDICINE
Payer: COMMERCIAL

## 2018-11-05 VITALS
WEIGHT: 190 LBS | TEMPERATURE: 97 F | HEART RATE: 58 BPM | DIASTOLIC BLOOD PRESSURE: 59 MMHG | HEIGHT: 66 IN | OXYGEN SATURATION: 96 % | BODY MASS INDEX: 30.53 KG/M2 | RESPIRATION RATE: 16 BRPM | SYSTOLIC BLOOD PRESSURE: 107 MMHG

## 2018-11-05 DIAGNOSIS — Z86.010 HX OF COLONIC POLYPS: ICD-10-CM

## 2018-11-05 PROBLEM — Z86.0100 HX OF COLONIC POLYPS: Status: ACTIVE | Noted: 2018-11-05

## 2018-11-05 PROCEDURE — 37000008 HC ANESTHESIA 1ST 15 MINUTES: Mod: PO | Performed by: INTERNAL MEDICINE

## 2018-11-05 PROCEDURE — 45385 COLONOSCOPY W/LESION REMOVAL: CPT | Mod: PO | Performed by: INTERNAL MEDICINE

## 2018-11-05 PROCEDURE — 63600175 PHARM REV CODE 636 W HCPCS: Mod: PO | Performed by: NURSE ANESTHETIST, CERTIFIED REGISTERED

## 2018-11-05 PROCEDURE — 27201089 HC SNARE, DISP (ANY): Mod: PO | Performed by: INTERNAL MEDICINE

## 2018-11-05 PROCEDURE — 88305 TISSUE EXAM BY PATHOLOGIST: CPT | Performed by: PATHOLOGY

## 2018-11-05 PROCEDURE — 45385 COLONOSCOPY W/LESION REMOVAL: CPT | Mod: 33,,, | Performed by: INTERNAL MEDICINE

## 2018-11-05 PROCEDURE — D9220A PRA ANESTHESIA: Mod: 33,CRNA,, | Performed by: NURSE ANESTHETIST, CERTIFIED REGISTERED

## 2018-11-05 PROCEDURE — 88305 TISSUE EXAM BY PATHOLOGIST: CPT | Mod: 26,,, | Performed by: PATHOLOGY

## 2018-11-05 PROCEDURE — D9220A PRA ANESTHESIA: Mod: 33,ANES,, | Performed by: ANESTHESIOLOGY

## 2018-11-05 PROCEDURE — 25000003 PHARM REV CODE 250: Mod: PO | Performed by: INTERNAL MEDICINE

## 2018-11-05 PROCEDURE — 37000009 HC ANESTHESIA EA ADD 15 MINS: Mod: PO | Performed by: INTERNAL MEDICINE

## 2018-11-05 RX ORDER — LIDOCAINE HCL/PF 100 MG/5ML
SYRINGE (ML) INTRAVENOUS
Status: DISCONTINUED | OUTPATIENT
Start: 2018-11-05 | End: 2018-11-05

## 2018-11-05 RX ORDER — SODIUM CHLORIDE, SODIUM LACTATE, POTASSIUM CHLORIDE, CALCIUM CHLORIDE 600; 310; 30; 20 MG/100ML; MG/100ML; MG/100ML; MG/100ML
INJECTION, SOLUTION INTRAVENOUS CONTINUOUS
Status: DISCONTINUED | OUTPATIENT
Start: 2018-11-05 | End: 2018-11-05 | Stop reason: HOSPADM

## 2018-11-05 RX ORDER — SODIUM CHLORIDE 0.9 % (FLUSH) 0.9 %
3 SYRINGE (ML) INJECTION
Status: DISCONTINUED | OUTPATIENT
Start: 2018-11-05 | End: 2018-11-05 | Stop reason: HOSPADM

## 2018-11-05 RX ORDER — SODIUM CHLORIDE 0.9 % (FLUSH) 0.9 %
3 SYRINGE (ML) INJECTION
Status: CANCELLED | OUTPATIENT
Start: 2018-11-05

## 2018-11-05 RX ORDER — PROPOFOL 10 MG/ML
VIAL (ML) INTRAVENOUS
Status: DISCONTINUED | OUTPATIENT
Start: 2018-11-05 | End: 2018-11-05

## 2018-11-05 RX ADMIN — PROPOFOL 30 MG: 10 INJECTION, EMULSION INTRAVENOUS at 09:11

## 2018-11-05 RX ADMIN — SODIUM CHLORIDE, SODIUM LACTATE, POTASSIUM CHLORIDE, AND CALCIUM CHLORIDE: .6; .31; .03; .02 INJECTION, SOLUTION INTRAVENOUS at 08:11

## 2018-11-05 RX ADMIN — PROPOFOL 50 MG: 10 INJECTION, EMULSION INTRAVENOUS at 09:11

## 2018-11-05 RX ADMIN — PROPOFOL 150 MG: 10 INJECTION, EMULSION INTRAVENOUS at 09:11

## 2018-11-05 RX ADMIN — LIDOCAINE HYDROCHLORIDE 100 MG: 20 INJECTION, SOLUTION INTRAVENOUS at 09:11

## 2018-11-05 NOTE — TRANSFER OF CARE
"Anesthesia Transfer of Care Note    Patient: Efra Corey    Procedure(s) Performed: Procedure(s) (LRB):  COLONOSCOPY (N/A)    Patient location: PACU    Anesthesia Type: general    Transport from OR: Transported from OR on room air with adequate spontaneous ventilation    Post pain: adequate analgesia    Post assessment: no apparent anesthetic complications and tolerated procedure well    Post vital signs: stable    Level of consciousness: sedated and responds to stimulation    Nausea/Vomiting: no nausea/vomiting    Complications: none    Transfer of care protocol was followed      Last vitals:   Visit Vitals  BP (!) 138/90 (BP Location: Right arm, Patient Position: Sitting)   Pulse 61   Temp 36.2 °C (97.2 °F) (Skin)   Resp 16   Ht 5' 6" (1.676 m)   Wt 86.2 kg (190 lb)   SpO2 95%   BMI 30.67 kg/m²     "

## 2018-11-05 NOTE — ANESTHESIA PREPROCEDURE EVALUATION
11/05/2018  Efra Corey is a 58 y.o., male.    Anesthesia Evaluation         Review of Systems  Anesthesia Hx:  No problems with previous Anesthesia   Social:  Non-Smoker    Cardiovascular:  Cardiovascular Normal     Pulmonary:  Pulmonary Normal    Neurological:  Neurology Normal        Physical Exam  General:  Well nourished    Airway/Jaw/Neck:  Airway Findings: Mouth Opening: Normal Tongue: Normal  General Airway Assessment: Adult  Mallampati: II  Improves to II with phonation.  TM Distance: Normal, at least 6 cm       Chest/Lungs:  Chest/Lungs Findings: Clear to auscultation, Normal Respiratory Rate     Heart/Vascular:  Heart Findings: Rate: Normal  Rhythm: Regular Rhythm             Anesthesia Plan  Type of Anesthesia, risks & benefits discussed:  Anesthesia Type:  general  Patient's Preference: General  Intra-op Monitoring Plan: standard ASA monitors  Intra-op Monitoring Plan Comments:   Post Op Pain Control Plan:   Post Op Pain Control Plan Comments:   Induction:   IV  Beta Blocker:  Patient is not currently on a Beta-Blocker (No further documentation required).       Informed Consent: Patient understands risks and agrees with Anesthesia plan.  Questions answered. Anesthesia consent signed with patient.  ASA Score: 1     Day of Surgery Review of History & Physical:    H&P update referred to the surgeon.         Ready For Surgery From Anesthesia Perspective.

## 2018-11-05 NOTE — [employer] PATIENT INSTRUCTIONS
Discharge Summary/Instructions after an Endoscopic Procedure  Patient Name: Efra Corey  Patient MRN: [id number]  Patient YOB: 1960  Monday, November 05, 2018  Efra Paul MD  RESTRICTIONS:  During your procedure today, you received medications for sedation.  These   medications may affect your judgment, balance and coordination.  Therefore,   for 24 hours, you have the following restrictions:   - DO NOT drive a car, operate machinery, make legal/financial decisions,   sign important papers or drink alcohol.    ACTIVITY:  Today: no heavy lifting, straining or running due to procedural   sedation/anesthesia.  The following day: return to full activity including work.  DIET:  Eat and drink normally unless instructed otherwise.     TREATMENT FOR COMMON SIDE EFFECTS:  - Mild abdominal pain, nausea, belching, bloating or excessive gas:  rest,   eat lightly and use a heating pad.  - Sore Throat: treat with throat lozenges and/or gargle with warm salt   water.  - Because air was used during the procedure, expelling large amounts of air   from your rectum or belching is normal.  - If a bowel prep was taken, you may not have a bowel movement for 1-3 days.    This is normal.  SYMPTOMS TO WATCH FOR AND REPORT TO YOUR PHYSICIAN:  1. Abdominal pain or bloating, other than gas cramps.  2. Chest pain.  3. Back pain.  4. Signs of infection such as: chills or fever occurring within 24 hours   after the procedure.  5. Rectal bleeding, which would show as bright red, maroon, or black stools.   (A tablespoon of blood from the rectum is not serious, especially if   hemorrhoids are present.)  6. Vomiting.  7. Weakness or dizziness.  GO DIRECTLY TO THE NEAREST EMERGENCY ROOM IF YOU HAVE ANY OF THE FOLLOWING:      Difficulty breathing              Chills and/or fever over 101 F   Persistent vomiting and/or vomiting blood   Severe abdominal pain   Severe chest pain   Black, tarry stools   Bleeding- more than one  tablespoon   Any other symptom or condition that you feel may need urgent attention  Your doctor recommends these additional instructions:  If any biopsies were taken, your doctors clinic will contact you in 1 to 2   weeks with any results.  We are waiting for your pathology results.   Your physician has recommended a repeat colonoscopy in five years for   surveillance based on pathology results.   You are being discharged to home.  For questions, problems or results please call your physician - Efra Paul MD at Work: [phone].  EMERGENCY PHONE NUMBER: [phone], LAB RESULTS: [phone]  IF A COMPLICATION OR EMERGENCY SITUATION ARISES AND YOU ARE UNABLE TO REACH   YOUR PHYSICIAN - GO DIRECTLY TO THE EMERGENCY ROOM.  ___________________________________________  Nurse Signature  ___________________________________________  Patient/Designated Responsible Party Signature  Efra Paul MD  11/5/2018 9:47:14 AM  This report has been verified and signed electronically.  [employer]

## 2018-11-05 NOTE — PLAN OF CARE
PIV removed.  Pressure dressing applied.  Discharge instructions reviewed with patient and spouse.  All questions answered.  D/C via wheelchair to personal vehicle.

## 2018-11-05 NOTE — H&P
History & Physical - Short Stay  Gastroenterology      SUBJECTIVE:     Procedure: Colonoscopy    Chief Complaint/Indication for Procedure: Previous Polyps    PTA Medications   Medication Sig    HYDROcodone-acetaminophen (NORCO) 5-325 mg per tablet One PO every 4-6 hours PRN pain    acyclovir (ZOVIRAX) 400 MG tablet TAKE 1 TABLET (400 MG TOTAL) BY MOUTH 2 (TWO) TIMES DAILY.    sildenafil (REVATIO) 20 mg Tab TAKE 1 TABLET 3 TIMES A DAY       Review of patient's allergies indicates:  No Known Allergies     Past Medical History:   Diagnosis Date    Colon polyp      Past Surgical History:   Procedure Laterality Date    COLONOSCOPY      COLONOSCOPY N/A 2/19/2013    [facility]    [facility] W/ ACL RECONSTRUCTION  [facility]    MOUTH SURGERY      SKIN CANCER EXCISION       History reviewed. No pertinent family history.  Social History     Tobacco Use    Smoking status: Never Smoker    Smokeless tobacco: Never Used   Substance Use Topics    Alcohol use: Yes     Alcohol/week: 0.6 oz     Types: 1 Cans of beer per week     Comment: social    Drug use: No         OBJECTIVE:     Vital Signs (Most Recent)  Temp: 97.2 °F (36.2 °C) (11/05/18 0839)  Pulse: 61 (11/05/18 0839)  Resp: 16 (11/05/18 0839)  BP: (!) 138/90 (11/05/18 0839)  SpO2: 95 % (11/05/18 0839)    Physical Exam:                                                       GENERAL:  Comfortable, in no acute distress.                                 HEENT EXAM:  Nonicteric.  No adenopathy.  Oropharynx is clear.               NECK:  Supple.                                                               LUNGS:  Clear.                                                               CARDIAC:  Regular rate and rhythm.  S1, S2.  No murmur.                      ABDOMEN:  Soft, positive bowel sounds, nontender.  No hepatosplenomegaly or masses.  No rebound or guarding.                                              EXTREMITIES:  No edema.     MENTAL STATUS:  Normal, alert and oriented.      ASSESSMENT/PLAN:     Assessment: Previous Polyps    Plan: Colonoscopy    Anesthesia Plan: General    ASA Grade: ASA 2 - Patient with mild systemic disease with no functional limitations    MALLAMPATI SCORE:  I (soft palate, uvula, fauces, and tonsillar pillars visible)

## 2018-11-05 NOTE — DISCHARGE INSTRUCTIONS

## 2018-11-05 NOTE — ANESTHESIA POSTPROCEDURE EVALUATION
"Anesthesia Post Evaluation    Patient: Efra Corey    Procedure(s) Performed: Procedure(s) (LRB):  COLONOSCOPY (N/A)    Final Anesthesia Type: general  Patient location during evaluation: PACU  Patient participation: Yes- Able to Participate  Level of consciousness: awake and alert, oriented and awake  Post-procedure vital signs: reviewed and stable  Pain management: adequate  Airway patency: patent  PONV status at discharge: No PONV  Anesthetic complications: no      Cardiovascular status: blood pressure returned to baseline and hemodynamically stable  Respiratory status: unassisted, spontaneous ventilation and room air  Hydration status: euvolemic  Follow-up not needed.        Visit Vitals  BP (!) 107/59   Pulse (!) 58   Temp 36.2 °C (97.2 °F) (Skin)   Resp 16   Ht 5' 6" (1.676 m)   Wt 86.2 kg (190 lb)   SpO2 96%   BMI 30.67 kg/m²       Pain/Urban Score: Pain Assessment Performed: Yes (11/5/2018  9:49 AM)  Presence of Pain: denies (11/5/2018 10:06 AM)  Urban Score: 10 (11/5/2018 10:06 AM)        "

## 2018-11-05 NOTE — DISCHARGE SUMMARY
Discharge Note  Short Stay      SUMMARY     Admit Date: 11/5/2018    Attending Physician: Efra Paul MD     Discharge Physician: Efra Paul MD    Discharge Date: 11/5/2018 9:47 AM    Final Diagnosis: Routine adult health maintenance [Z00.00]  Hx of colonic polyps [Z86.010]    Disposition: HOME OR SELF CARE    Patient Instructions:   Current Discharge Medication List      CONTINUE these medications which have NOT CHANGED    Details   HYDROcodone-acetaminophen (NORCO) 5-325 mg per tablet One PO every 4-6 hours PRN pain  Qty: 12 tablet, Refills: 0      acyclovir (ZOVIRAX) 400 MG tablet TAKE 1 TABLET (400 MG TOTAL) BY MOUTH 2 (TWO) TIMES DAILY.  Qty: 60 tablet, Refills: 9      sildenafil (REVATIO) 20 mg Tab TAKE 1 TABLET 3 TIMES A DAY  Qty: 30 tablet, Refills: 5    Associated Diagnoses: Erectile dysfunction, unspecified erectile dysfunction type             Discharge Procedure Orders (must include Diet, Follow-up, Activity)    Follow Up:  Follow up with PCP as previously scheduled  Resume routine diet.  Activity as tolerated.    No driving day of procedure.

## 2018-11-14 ENCOUNTER — OFFICE VISIT (OUTPATIENT)
Dept: DERMATOLOGY | Facility: CLINIC | Age: 58
End: 2018-11-14
Payer: COMMERCIAL

## 2018-11-14 DIAGNOSIS — Z48.02 VISIT FOR SUTURE REMOVAL: Primary | ICD-10-CM

## 2018-11-14 PROCEDURE — 99999 PR PBB SHADOW E&M-EST. PATIENT-LVL II: CPT | Mod: PBBFAC,,, | Performed by: DERMATOLOGY

## 2018-11-14 PROCEDURE — 99024 POSTOP FOLLOW-UP VISIT: CPT | Mod: S$GLB,,, | Performed by: DERMATOLOGY

## 2018-11-14 NOTE — PROGRESS NOTES
CC: 58 y.o.male patient is here for followup     HPI: Patient is 6-7 week(s) s/p Mohs' micrographic surgery, fresh tissue technique, of a basal cell carcinoma on the right neck; with subsequent repair   Patient reports a bump at the upper end; somewhat tender  Apparently had a vicryl suture extrude to the spot, which he cut off    EXAM: Site appears well healed. At the superior/posterior end of the healed scar, there is an approximately 8-9 mm dermal/subdermal nodule, consistent with a suture granuloma    IMPRESSION: Well healed post Mohs' micrographic surgery  Suture granuloma    PLAN:  Discussed anticipated course  Expect gradual resolution of suture granuloma  Followup to Dr. Nava in 2-3 months; prn to me

## 2019-04-23 DIAGNOSIS — N52.9 ERECTILE DYSFUNCTION, UNSPECIFIED ERECTILE DYSFUNCTION TYPE: ICD-10-CM

## 2019-04-24 RX ORDER — SILDENAFIL CITRATE 20 MG/1
TABLET ORAL
Qty: 30 TABLET | Refills: 5 | Status: SHIPPED | OUTPATIENT
Start: 2019-04-24 | End: 2020-03-05 | Stop reason: SDUPTHER

## 2019-08-23 RX ORDER — ACYCLOVIR 400 MG/1
400 TABLET ORAL 2 TIMES DAILY
Qty: 60 TABLET | Refills: 9 | Status: SHIPPED | OUTPATIENT
Start: 2019-08-23 | End: 2020-10-15

## 2019-08-23 NOTE — PROGRESS NOTES
Refill Authorization Note     is requesting a refill authorization.    Brief assessment and rationale for refill: ROUTE: op/ Needs Appt   Name and strength of medication: acyclovir (ZOVIRAX) 400 MG tablet  Medication-related problems identified: Requires appointment    Medication Therapy Plan: roma nco, Needs Appt(ANNUAL); Outside of protocol, Route to you     Medication reconciliation completed: No              How patient will take medication: t1t po bid          Comments: labs not assessed by refill clinic- outside of protocol  (For Chronic Use: Labs- WBC & Creatinine are required)    Last CBC 12 months   No results found for: WBC, HGB, RBC, HCT, MCV, PLT   No results found for: HGB, HCT, MCV, PLT     Last Creatinine 12 months Recommended value: eGFR  >30mL/min    Lab Results   Component Value Date    CREATININE 1.4 11/20/2017    CREATININE 1.4 08/18/2016    CREATININE 1.3 12/31/2014    Lab Results   Component Value Date    EGFRNONAA 55.8 (A) 11/20/2017      Lab Results   Component Value Date    ESTGFRAFRICA >60.0 11/20/2017        APPOINTMENTS(past 12m or future 3m authorizing provider)    Date Provider   LAST VISIT  11/15/2017 Fito Morris MD   NEXT VISIT  Visit date not found Fito Morris MD

## 2019-08-23 NOTE — TELEPHONE ENCOUNTER
Please schedule patient for appointment: Annual   Thanks!        APPOINTMENTS(past 12m or future 3m authorizing provider)    Date Provider   LAST VISIT  11/15/2017 Fito Morris MD   NEXT VISIT  Visit date not found Fito Morris MD

## 2019-08-26 NOTE — TELEPHONE ENCOUNTER
Visit Scheduled with PCP by nursing staff    See updated appointment schedule for patient.    APPOINTMENTS past 12m or future 3m    Date Provider   LAST VISIT  11/15/2017 Fito Morris MD   NEXT VISIT  8/27/2019 Fito Morris MD

## 2019-08-27 ENCOUNTER — HOSPITAL ENCOUNTER (OUTPATIENT)
Dept: RADIOLOGY | Facility: HOSPITAL | Age: 59
Discharge: HOME | End: 2019-08-27
Attending: FAMILY MEDICINE
Payer: COMMERCIAL

## 2019-08-27 ENCOUNTER — OFFICE VISIT (OUTPATIENT)
Dept: FAMILY MEDICINE | Facility: CLINIC | Age: 59
End: 2019-08-27
Payer: COMMERCIAL

## 2019-08-27 VITALS
OXYGEN SATURATION: 97 % | HEIGHT: 66 IN | SYSTOLIC BLOOD PRESSURE: 124 MMHG | DIASTOLIC BLOOD PRESSURE: 74 MMHG | HEART RATE: 66 BPM | WEIGHT: 176.56 LBS | BODY MASS INDEX: 28.38 KG/M2

## 2019-08-27 DIAGNOSIS — G56.03 CARPAL TUNNEL SYNDROME ON BOTH SIDES: ICD-10-CM

## 2019-08-27 DIAGNOSIS — M47.22 OSTEOARTHRITIS OF SPINE WITH RADICULOPATHY, CERVICAL REGION: ICD-10-CM

## 2019-08-27 DIAGNOSIS — Z00.00 PREVENTATIVE HEALTH CARE: Primary | ICD-10-CM

## 2019-08-27 PROCEDURE — 99396 PR PREVENTIVE VISIT,EST,40-64: ICD-10-PCS | Mod: S$GLB,,, | Performed by: FAMILY MEDICINE

## 2019-08-27 PROCEDURE — 99999 PR PBB SHADOW E&M-EST. PATIENT-LVL III: ICD-10-PCS | Mod: PBBFAC,,, | Performed by: FAMILY MEDICINE

## 2019-08-27 PROCEDURE — 72050 XR CERVICAL SPINE COMPLETE 5 VIEW: ICD-10-PCS | Mod: 26,,, | Performed by: RADIOLOGY

## 2019-08-27 PROCEDURE — 99999 PR PBB SHADOW E&M-EST. PATIENT-LVL III: CPT | Mod: PBBFAC,,, | Performed by: FAMILY MEDICINE

## 2019-08-27 PROCEDURE — 72050 X-RAY EXAM NECK SPINE 4/5VWS: CPT | Mod: 26,,, | Performed by: RADIOLOGY

## 2019-08-27 PROCEDURE — 99396 PREV VISIT EST AGE 40-64: CPT | Mod: S$GLB,,, | Performed by: FAMILY MEDICINE

## 2019-08-27 PROCEDURE — 72050 X-RAY EXAM NECK SPINE 4/5VWS: CPT | Mod: TC,FY,PO

## 2019-08-27 NOTE — PROGRESS NOTES
Subjective:       Patient ID: Efra Corey is a 59 y.o. male.    Chief Complaint: Annual Exam    Here for routine physical.    C/o some general bilateral arm aching and numbnes in bilateral hands which comes and goes.  This has been present for over 15 years.  Numbness and burning in the hands does awaken him at night. Pain does improve with hand movement.  Symptoms of pain down the arm also occurs with driving and with holding weight lie gallon of milk.  There is a constant dull ache in bilateral shoulder.s      Past Medical History:    Colon polyp - colonoscopy                                                   Past Surgical History:    KNEE ARTHROSCOPY W/ ACL RECONSTRUCTION                       Comment:left; Dr. Rock Alanis    MOUTH SURGERY                                                  No Known Allergies    Social History    Marital Status: Single              Spouse Name:                       Years of Education:                 Number of children: 2             Occupational History  Occupation          Employer            Comment               [employer]                         Social History Main Topics    Smoking Status: Never Smoker                      Smokeless Status: Never Used                        Alcohol Use: Yes                Comment: social    Drug Use: No              Sexual Activity: Not on file          Other Topics            Concern    None on file    Social History Narrative    : wife  of breast cancer      Exercise: regular      From [place], [place]    Current Outpatient Medications on File Prior to Visit:  acyclovir (ZOVIRAX) 400 MG tablet, TAKE 1 TABLET (400 MG TOTAL) BY MOUTH 2 (TWO) TIMES DAILY., Disp: 60 tablet, Rfl: 9  HYDROcodone-acetaminophen (NORCO) 5-325 mg per tablet, One PO every 4-6 hours PRN pain, Disp: 12 tablet, Rfl: 0  sildenafil (REVATIO) 20 mg Tab, TAKE 1 TABLET BY MOUTH THREE TIMES A DAY, Disp: 30 tablet, Rfl: 5    No current  "facility-administered medications on file prior to visit.       No family history on file.              Review of Systems   Constitutional: Negative for appetite change, chills, fever and unexpected weight change.   HENT: Negative for sore throat and trouble swallowing.    Eyes: Negative for pain and visual disturbance.   Respiratory: Negative for cough, chest tightness, shortness of breath and wheezing.    Cardiovascular: Negative for chest pain, palpitations and leg swelling.   Gastrointestinal: Negative for abdominal pain, blood in stool, constipation, diarrhea and nausea.   Genitourinary: Negative for difficulty urinating, dysuria and hematuria.   Musculoskeletal: Positive for arthralgias (shoulders and knees), neck pain and neck stiffness. Negative for gait problem.   Skin: Negative for rash and wound.   Neurological: Negative for dizziness, weakness, light-headedness and headaches.   Hematological: Negative for adenopathy.   Psychiatric/Behavioral: Negative for dysphoric mood.       Objective:       /74   Pulse 66   Ht 5' 6" (1.676 m)   Wt 80.1 kg (176 lb 9.4 oz)   SpO2 97%   BMI 28.50 kg/m²     Physical Exam   Constitutional: He is oriented to person, place, and time. He appears well-developed and well-nourished. He is active. No distress.   HENT:   Head: Normocephalic and atraumatic.   Right Ear: External ear normal.   Left Ear: External ear normal.   Mouth/Throat: Uvula is midline, oropharynx is clear and moist and mucous membranes are normal. No oropharyngeal exudate.   Eyes: Pupils are equal, round, and reactive to light. Conjunctivae, EOM and lids are normal.   Neck: Normal range of motion, full passive range of motion without pain and phonation normal. Neck supple. No thyroid mass and no thyromegaly present.   Cardiovascular: Normal rate, regular rhythm, normal heart sounds and intact distal pulses. Exam reveals no gallop and no friction rub.   No murmur heard.  Pulmonary/Chest: Effort normal " and breath sounds normal. No respiratory distress. He has no wheezes. He has no rales.   Abdominal: Soft. Bowel sounds are normal. He exhibits no distension and no mass. There is no tenderness. There is no rebound and no guarding.   Musculoskeletal:        Right shoulder: He exhibits decreased range of motion and crepitus. He exhibits no tenderness, no bony tenderness and normal strength.        Left shoulder: He exhibits decreased range of motion and crepitus. He exhibits no tenderness, no bony tenderness and normal strength.        Right knee: He exhibits abnormal patellar mobility (some crepitus). He exhibits normal range of motion.        Left knee: He exhibits decreased range of motion (limited extension). Tenderness found. Medial joint line and lateral joint line tenderness noted.        Cervical back: He exhibits decreased range of motion. He exhibits no spasm.   Lymphadenopathy:     He has no cervical adenopathy.   Neurological: He is alert and oriented to person, place, and time. No cranial nerve deficit. Coordination normal.   Skin: Skin is warm and dry.   Psychiatric: He has a normal mood and affect. His speech is normal and behavior is normal. Judgment and thought content normal.     positive Tinnels bilaterally (worse on the left)    Results for orders placed or performed in visit on 11/20/17   Comprehensive metabolic panel   Result Value Ref Range    Sodium 141 136 - 145 mmol/L    Potassium 5.2 (H) 3.5 - 5.1 mmol/L    Chloride 107 95 - 110 mmol/L    CO2 28 23 - 29 mmol/L    Glucose 101 70 - 110 mg/dL    BUN, Bld 15 6 - 20 mg/dL    Creatinine 1.4 0.5 - 1.4 mg/dL    Calcium 9.5 8.7 - 10.5 mg/dL    Total Protein 7.4 6.0 - 8.4 g/dL    Albumin 3.8 3.5 - 5.2 g/dL    Total Bilirubin 0.7 0.1 - 1.0 mg/dL    Alkaline Phosphatase 78 55 - 135 U/L    AST 23 10 - 40 U/L    ALT 19 10 - 44 U/L    Anion Gap 6 (L) 8 - 16 mmol/L    eGFR if African American >60.0 >60 mL/min/1.73 m^2    eGFR if non  55.8  (A) >60 mL/min/1.73 m^2   Lipid panel   Result Value Ref Range    Cholesterol 196 120 - 199 mg/dL    Triglycerides 117 30 - 150 mg/dL    HDL 46 40 - 75 mg/dL    LDL Cholesterol 126.6 63.0 - 159.0 mg/dL    Hdl/Cholesterol Ratio 23.5 20.0 - 50.0 %    Total Cholesterol/HDL Ratio 4.3 2.0 - 5.0    Non-HDL Cholesterol 150 mg/dL   PSA, Screening   Result Value Ref Range    PSA, SCREEN 0.90 0.00 - 4.00 ng/mL       Assessment:       1. Preventative health care    2. Osteoarthritis of spine with radiculopathy, cervical region    3. Carpal tunnel syndrome on both sides        Plan:       Preventative health care  -     Comprehensive metabolic panel; Future; Expected date: 08/27/2019  -     Lipid panel; Future; Expected date: 08/27/2019  -     PSA, Screening; Future; Expected date: 08/27/2019    Osteoarthritis of spine with radiculopathy, cervical region  -     X-Ray Cervical Spine Complete 5 view; Future; Expected date: 08/27/2019    Carpal tunnel syndrome on both sides  -     SPLINT FOR HOME USE      Suspect likely combination of cervical spondylosis and left wrist CTS  basic wrist and neck stretching.  Trial of left wrist cock-up splint at night  Labs soon  Consider PT for neck and hands if symptoms persist  Counseled on regular exercise, maintenance of a healthy weight, balanced diet rich in fruits/vegetables and lean protein, and avoidance of unhealthy habits like smoking and excessive alcohol intake.

## 2019-08-30 ENCOUNTER — LAB VISIT (OUTPATIENT)
Dept: LAB | Facility: HOSPITAL | Age: 59
End: 2019-08-30
Attending: FAMILY MEDICINE
Payer: COMMERCIAL

## 2019-08-30 DIAGNOSIS — Z00.00 PREVENTATIVE HEALTH CARE: ICD-10-CM

## 2019-08-30 LAB
ALBUMIN SERPL BCP-MCNC: 4.2 G/DL (ref 3.5–5.2)
ALP SERPL-CCNC: 71 U/L (ref 55–135)
ALT SERPL W/O P-5'-P-CCNC: 14 U/L (ref 10–44)
ANION GAP SERPL CALC-SCNC: 7 MMOL/L (ref 8–16)
AST SERPL-CCNC: 19 U/L (ref 10–40)
BILIRUB SERPL-MCNC: 0.6 MG/DL (ref 0.1–1)
BUN SERPL-MCNC: 17 MG/DL (ref 6–20)
CALCIUM SERPL-MCNC: 9 MG/DL (ref 8.7–10.5)
CHLORIDE SERPL-SCNC: 107 MMOL/L (ref 95–110)
CHOLEST SERPL-MCNC: 170 MG/DL (ref 120–199)
CHOLEST/HDLC SERPL: 3.7 {RATIO} (ref 2–5)
CO2 SERPL-SCNC: 26 MMOL/L (ref 23–29)
COMPLEXED PSA SERPL-MCNC: 2.1 NG/ML (ref 0–4)
CREAT SERPL-MCNC: 1.4 MG/DL (ref 0.5–1.4)
EST. GFR  (AFRICAN AMERICAN): >60 ML/MIN/1.73 M^2
EST. GFR  (NON AFRICAN AMERICAN): 55 ML/MIN/1.73 M^2
GLUCOSE SERPL-MCNC: 84 MG/DL (ref 70–110)
HDLC SERPL-MCNC: 46 MG/DL (ref 40–75)
HDLC SERPL: 27.1 % (ref 20–50)
LDLC SERPL CALC-MCNC: 105.2 MG/DL (ref 63–159)
NONHDLC SERPL-MCNC: 124 MG/DL
POTASSIUM SERPL-SCNC: 4.4 MMOL/L (ref 3.5–5.1)
PROT SERPL-MCNC: 7.1 G/DL (ref 6–8.4)
SODIUM SERPL-SCNC: 140 MMOL/L (ref 136–145)
TRIGL SERPL-MCNC: 94 MG/DL (ref 30–150)

## 2019-08-30 PROCEDURE — 84153 ASSAY OF PSA TOTAL: CPT

## 2019-08-30 PROCEDURE — 36415 COLL VENOUS BLD VENIPUNCTURE: CPT | Mod: PO

## 2019-08-30 PROCEDURE — 80061 LIPID PANEL: CPT

## 2019-08-30 PROCEDURE — 80053 COMPREHEN METABOLIC PANEL: CPT

## 2019-09-13 ENCOUNTER — TELEPHONE (OUTPATIENT)
Dept: FAMILY MEDICINE | Facility: CLINIC | Age: 59
End: 2019-09-13

## 2019-09-13 NOTE — TELEPHONE ENCOUNTER
----- Message from Lazaro Vuong sent at 9/13/2019  1:37 PM CDT -----  Contact: same  Type:  Test Results    Who Called:  Patient  Name of Test (Lab/Mammo/Etc):  Labs & X-ray  Date of Test:  Labs/8-30-19 & X-ray/8-27-19  Ordering Provider:  Arturo  Where the test was performed:  [address]  Best Call Back Number:  [phone]  Additional Information:  n/a

## 2020-03-05 DIAGNOSIS — N52.9 ERECTILE DYSFUNCTION, UNSPECIFIED ERECTILE DYSFUNCTION TYPE: ICD-10-CM

## 2020-03-09 NOTE — PROGRESS NOTES
Refill Authorization Note     is requesting a refill authorization.    Brief assessment and rationale for refill: APPROVE: prr                Medication reconciliation completed: No                         Comments:   Requested Prescriptions   Pending Prescriptions Disp Refills    sildenafil (REVATIO) 20 mg Tab 30 tablet 6     Sig: Take 1 tablet (20 mg total) by mouth 3 (three) times daily.       Urology: Erectile Dysfunction Agents Passed - 3/5/2020  9:46 AM        Passed - Patient is at least 18 years old        Passed - Nitrates are not on the active medication list        Passed - Last BP in normal range within 360 days.     BP Readings from Last 3 Encounters:   08/27/19 124/74   05/13/19 119/74   11/05/18 (!) 107/59              Passed - Office visit in past 12 months or future 90 days.     Recent Outpatient Visits            6 months ago [facility] health care    [facility] Fito Morris MD    10 months ago Traumatic arthritis of knee, left    [facility] Susanna Alanis MD    10 months ago Traumatic arthritis of knee, left    [facility] Susanna Alanis MD    10 months ago Traumatic arthritis of knee, left    [facility] Susanna Alanis MD    11 months ago Traumatic arthritis of knee, left    [facility] Susanna Alanis MD                     Appointments  past 12m or future 3m with PCP    Date Provider   Last Visit   8/27/2019 Fito Morris MD   Next Visit   Visit date not found Fito Morris MD   .  ED visits in past 90 days: 0       Note composed:9:26 AM 03/09/2020

## 2020-03-10 RX ORDER — SILDENAFIL CITRATE 20 MG/1
20 TABLET ORAL 3 TIMES DAILY
Qty: 30 TABLET | Refills: 5 | Status: SHIPPED | OUTPATIENT
Start: 2020-03-10 | End: 2021-04-30 | Stop reason: SDUPTHER

## 2020-05-11 ENCOUNTER — OFFICE VISIT (OUTPATIENT)
Dept: FAMILY MEDICINE | Facility: CLINIC | Age: 60
End: 2020-05-11
Payer: COMMERCIAL

## 2020-05-11 VITALS
HEART RATE: 62 BPM | BODY MASS INDEX: 29.91 KG/M2 | TEMPERATURE: 98 F | SYSTOLIC BLOOD PRESSURE: 136 MMHG | WEIGHT: 185.31 LBS | DIASTOLIC BLOOD PRESSURE: 88 MMHG | OXYGEN SATURATION: 98 %

## 2020-05-11 DIAGNOSIS — Z00.00 PREVENTATIVE HEALTH CARE: Primary | ICD-10-CM

## 2020-05-11 DIAGNOSIS — G56.03 CARPAL TUNNEL SYNDROME ON BOTH SIDES: ICD-10-CM

## 2020-05-11 DIAGNOSIS — M47.22 OSTEOARTHRITIS OF SPINE WITH RADICULOPATHY, CERVICAL REGION: ICD-10-CM

## 2020-05-11 PROCEDURE — 99396 PREV VISIT EST AGE 40-64: CPT | Mod: S$GLB,,, | Performed by: FAMILY MEDICINE

## 2020-05-11 PROCEDURE — 99999 PR PBB SHADOW E&M-EST. PATIENT-LVL III: CPT | Mod: PBBFAC,,, | Performed by: FAMILY MEDICINE

## 2020-05-11 PROCEDURE — 99999 PR PBB SHADOW E&M-EST. PATIENT-LVL III: ICD-10-PCS | Mod: PBBFAC,,, | Performed by: FAMILY MEDICINE

## 2020-05-11 PROCEDURE — 99396 PR PREVENTIVE VISIT,EST,40-64: ICD-10-PCS | Mod: S$GLB,,, | Performed by: FAMILY MEDICINE

## 2020-05-11 NOTE — PROGRESS NOTES
Subjective:       Patient ID: Efra Corey is a 60 y.o. male.    Chief Complaint: Annual Exam    Here for routine physical.      Overall feeling well    Still reports some persistent arm pain which comes and goes  With dull aching and numbness in both arms and hands      Past Medical History:    Colon polyp - colonoscopy                                                   Past Surgical History:    KNEE ARTHROSCOPY W/ ACL RECONSTRUCTION                       Comment:left; Dr. Rock Alanis    MOUTH SURGERY                                                  No Known Allergies    Social History    Marital Status: Single              Spouse Name:                       Years of Education:                 Number of children: 2             Occupational History  Occupation          Employer            Comment               [employer]                         Social History Main Topics    Smoking Status: Never Smoker                      Smokeless Status: Never Used                        Alcohol Use: Yes                Comment: social    Drug Use: No              Sexual Activity: Not on file          Other Topics            Concern    None on file    Social History Narrative    : wife  of breast cancer      Exercise: regular      From [place], [place]    Current Outpatient Medications on File Prior to Visit:  acyclovir (ZOVIRAX) 400 MG tablet, TAKE 1 TABLET (400 MG TOTAL) BY MOUTH 2 (TWO) TIMES DAILY., Disp: 60 tablet, Rfl: 9  sildenafil (REVATIO) 20 mg Tab, Take 1 tablet (20 mg total) by mouth 3 (three) times daily., Disp: 30 tablet, Rfl: 5    No current facility-administered medications on file prior to visit.     No family history on file.              Review of Systems   Constitutional: Negative for appetite change, chills, fever and unexpected weight change.   HENT: Negative for sore throat and trouble swallowing.    Eyes: Negative for pain and visual disturbance.   Respiratory: Negative for cough,  chest tightness, shortness of breath and wheezing.    Cardiovascular: Negative for chest pain, palpitations and leg swelling.   Gastrointestinal: Negative for abdominal pain, blood in stool, constipation, diarrhea and nausea.   Genitourinary: Negative for difficulty urinating, dysuria and hematuria.   Musculoskeletal: Positive for arthralgias (shoulders and knees), neck pain and neck stiffness. Negative for gait problem.   Skin: Negative for rash and wound.   Neurological: Negative for dizziness, weakness, light-headedness and headaches.   Hematological: Negative for adenopathy.   Psychiatric/Behavioral: Negative for dysphoric mood.       Objective:       /88 (BP Location: Left arm, Patient Position: Sitting)   Pulse 62   Temp 98.4 °F (36.9 °C) (Oral)   Wt 84.1 kg (185 lb 4.8 oz)   SpO2 98%   BMI 29.91 kg/m²     Physical Exam   Constitutional: He is oriented to person, place, and time. He appears well-developed and well-nourished. He is active. No distress.   HENT:   Head: Normocephalic and atraumatic.   Right Ear: External ear normal.   Left Ear: External ear normal.   Mouth/Throat: Uvula is midline, oropharynx is clear and moist and mucous membranes are normal. No oropharyngeal exudate.   Eyes: Pupils are equal, round, and reactive to light. Conjunctivae, EOM and lids are normal.   Neck: Normal range of motion, full passive range of motion without pain and phonation normal. Neck supple. No thyroid mass and no thyromegaly present.   Cardiovascular: Normal rate, regular rhythm, normal heart sounds and intact distal pulses. Exam reveals no gallop and no friction rub.   No murmur heard.  Pulmonary/Chest: Effort normal and breath sounds normal. No respiratory distress. He has no wheezes. He has no rales.   Abdominal: Soft. Bowel sounds are normal. He exhibits no distension and no mass. There is no tenderness. There is no rebound and no guarding.   Musculoskeletal:        Right shoulder: He exhibits decreased  range of motion and crepitus. He exhibits no tenderness, no bony tenderness and normal strength.        Left shoulder: He exhibits decreased range of motion and crepitus. He exhibits no tenderness, no bony tenderness and normal strength.        Right knee: He exhibits abnormal patellar mobility (some crepitus). He exhibits normal range of motion.        Left knee: He exhibits decreased range of motion (limited extension). Tenderness found. Medial joint line and lateral joint line tenderness noted.        Cervical back: He exhibits decreased range of motion. He exhibits no spasm.   Lymphadenopathy:     He has no cervical adenopathy.   Neurological: He is alert and oriented to person, place, and time. No cranial nerve deficit. Coordination normal.   Skin: Skin is warm and dry.   Psychiatric: He has a normal mood and affect. His speech is normal and behavior is normal. Judgment and thought content normal.     positive Tinnels bilaterally (worse on the left)    Results for orders placed or performed in visit on 08/30/19   Comprehensive metabolic panel   Result Value Ref Range    Sodium 140 136 - 145 mmol/L    Potassium 4.4 3.5 - 5.1 mmol/L    Chloride 107 95 - 110 mmol/L    CO2 26 23 - 29 mmol/L    Glucose 84 70 - 110 mg/dL    BUN, Bld 17 6 - 20 mg/dL    Creatinine 1.4 0.5 - 1.4 mg/dL    Calcium 9.0 8.7 - 10.5 mg/dL    Total Protein 7.1 6.0 - 8.4 g/dL    Albumin 4.2 3.5 - 5.2 g/dL    Total Bilirubin 0.6 0.1 - 1.0 mg/dL    Alkaline Phosphatase 71 55 - 135 U/L    AST 19 10 - 40 U/L    ALT 14 10 - 44 U/L    Anion Gap 7 (L) 8 - 16 mmol/L    eGFR if African American >60.0 >60 mL/min/1.73 m^2    eGFR if non African American 55.0 (A) >60 mL/min/1.73 m^2   Lipid panel   Result Value Ref Range    Cholesterol 170 120 - 199 mg/dL    Triglycerides 94 30 - 150 mg/dL    HDL 46 40 - 75 mg/dL    LDL Cholesterol 105.2 63.0 - 159.0 mg/dL    Hdl/Cholesterol Ratio 27.1 20.0 - 50.0 %    Total Cholesterol/HDL Ratio 3.7 2.0 - 5.0    Non-HDL  Cholesterol 124 mg/dL   PSA, Screening   Result Value Ref Range    PSA, SCREEN 2.1 0.00 - 4.00 ng/mL       Assessment:       1. Preventative health care    2. Osteoarthritis of spine with radiculopathy, cervical region    3. Carpal tunnel syndrome on both sides        Plan:       Preventative health care  -     Comprehensive metabolic panel; Future; Expected date: 05/11/2020  -     Lipid Panel; Future; Expected date: 05/11/2020  -     PSA, Screening; Future; Expected date: 05/11/2020  -     TSH; Future; Expected date: 05/11/2020    Osteoarthritis of spine with radiculopathy, cervical region  -     EMG W/ ULTRASOUND AND NERVE CONDUCTION TEST 2 Extremities; Future    Carpal tunnel syndrome on both sides  -     EMG W/ ULTRASOUND AND NERVE CONDUCTION TEST 2 Extremities; Future    labs today  Will get EMG/NCS and call with results  Suspect likely combination of cervical spondylosis and bilateral wrist CTS  basic wrist and neck stretching.  Counseled on regular exercise, maintenance of a healthy weight, balanced diet rich in fruits/vegetables and lean protein, and avoidance of unhealthy habits like smoking and excessive alcohol intake.

## 2020-05-19 ENCOUNTER — LAB VISIT (OUTPATIENT)
Dept: LAB | Facility: HOSPITAL | Age: 60
End: 2020-05-19
Attending: FAMILY MEDICINE
Payer: COMMERCIAL

## 2020-05-19 DIAGNOSIS — Z00.00 PREVENTATIVE HEALTH CARE: ICD-10-CM

## 2020-05-19 PROCEDURE — 80061 LIPID PANEL: CPT

## 2020-05-19 PROCEDURE — 84443 ASSAY THYROID STIM HORMONE: CPT

## 2020-05-19 PROCEDURE — 36415 COLL VENOUS BLD VENIPUNCTURE: CPT | Mod: PO

## 2020-05-19 PROCEDURE — 80053 COMPREHEN METABOLIC PANEL: CPT

## 2020-05-19 PROCEDURE — 84153 ASSAY OF PSA TOTAL: CPT

## 2020-05-20 LAB
ALBUMIN SERPL BCP-MCNC: 4.1 G/DL (ref 3.5–5.2)
ALP SERPL-CCNC: 65 U/L (ref 55–135)
ALT SERPL W/O P-5'-P-CCNC: 22 U/L (ref 10–44)
ANION GAP SERPL CALC-SCNC: 9 MMOL/L (ref 8–16)
AST SERPL-CCNC: 24 U/L (ref 10–40)
BILIRUB SERPL-MCNC: 0.5 MG/DL (ref 0.1–1)
BUN SERPL-MCNC: 18 MG/DL (ref 6–20)
CALCIUM SERPL-MCNC: 9.1 MG/DL (ref 8.7–10.5)
CHLORIDE SERPL-SCNC: 108 MMOL/L (ref 95–110)
CHOLEST SERPL-MCNC: 185 MG/DL (ref 120–199)
CHOLEST/HDLC SERPL: 3.2 {RATIO} (ref 2–5)
CO2 SERPL-SCNC: 24 MMOL/L (ref 23–29)
COMPLEXED PSA SERPL-MCNC: 1.1 NG/ML (ref 0–4)
CREAT SERPL-MCNC: 1.3 MG/DL (ref 0.5–1.4)
EST. GFR  (AFRICAN AMERICAN): >60 ML/MIN/1.73 M^2
EST. GFR  (NON AFRICAN AMERICAN): 59.7 ML/MIN/1.73 M^2
GLUCOSE SERPL-MCNC: 146 MG/DL (ref 70–110)
HDLC SERPL-MCNC: 58 MG/DL (ref 40–75)
HDLC SERPL: 31.4 % (ref 20–50)
LDLC SERPL CALC-MCNC: 103.2 MG/DL (ref 63–159)
NONHDLC SERPL-MCNC: 127 MG/DL
POTASSIUM SERPL-SCNC: 4.2 MMOL/L (ref 3.5–5.1)
PROT SERPL-MCNC: 7.1 G/DL (ref 6–8.4)
SODIUM SERPL-SCNC: 141 MMOL/L (ref 136–145)
TRIGL SERPL-MCNC: 119 MG/DL (ref 30–150)
TSH SERPL DL<=0.005 MIU/L-ACNC: 1.64 UIU/ML (ref 0.4–4)

## 2020-05-31 ENCOUNTER — PATIENT OUTREACH (OUTPATIENT)
Dept: ADMINISTRATIVE | Facility: OTHER | Age: 60
End: 2020-05-31

## 2020-06-03 ENCOUNTER — PROCEDURE VISIT (OUTPATIENT)
Dept: NEUROLOGY | Facility: CLINIC | Age: 60
End: 2020-06-03
Payer: COMMERCIAL

## 2020-06-03 DIAGNOSIS — M54.12 CERVICAL RADICULOPATHY: ICD-10-CM

## 2020-06-03 DIAGNOSIS — G56.03 BILATERAL CARPAL TUNNEL SYNDROME: ICD-10-CM

## 2020-06-03 PROCEDURE — 95886 MUSC TEST DONE W/N TEST COMP: CPT | Mod: S$GLB,,, | Performed by: PSYCHIATRY & NEUROLOGY

## 2020-06-03 PROCEDURE — 95911 PR NERVE CONDUCTION STUDY; 9-10 STUDIES: ICD-10-PCS | Mod: S$GLB,,, | Performed by: PSYCHIATRY & NEUROLOGY

## 2020-06-03 PROCEDURE — 95911 NRV CNDJ TEST 9-10 STUDIES: CPT | Mod: S$GLB,,, | Performed by: PSYCHIATRY & NEUROLOGY

## 2020-06-03 PROCEDURE — 95886 PR EMG COMPLETE, W/ NERVE CONDUCTION STUDIES, 5+ MUSCLES: ICD-10-PCS | Mod: S$GLB,,, | Performed by: PSYCHIATRY & NEUROLOGY

## 2020-06-15 ENCOUNTER — TELEPHONE (OUTPATIENT)
Dept: FAMILY MEDICINE | Facility: CLINIC | Age: 60
End: 2020-06-15

## 2020-06-15 DIAGNOSIS — M54.12 RADICULOPATHY, CERVICAL REGION: ICD-10-CM

## 2020-06-15 NOTE — PROCEDURES
[facility]   [facility]  [address] [place] [address]  [phone]             Full Name: Efra Corey Gender: Male  Patient ID: [id number] YOB: 1960      Visit Date: 6/3/2020 14:32  Age: 60 Years 4 Months Old  Examining Physician: Rach Jasso D.O., ABPN, AOBNP, ABEM   Referring Physician: Fito Morris MD  Technologist: Tatiana RICHARDSON   Height: 5 feet 6 inch  History: Patient complains of numbness and pain in both hands.  Patient has a history of cervical spondylosis.  Symptoms began 15 years ago.  Evaluate for carpal tunnel syndrome.      Sensory NCS      Nerve / Sites Rec. Site Onset Lat Peak Lat NP Amp Segments Distance Peak Diff Velocity     ms ms µV  cm ms m/s   R Median - Digit II (Antidromic)      Wrist Dig II 3.18 4.06 12.8 Wrist - Dig II 13  41      Ref.   ?3.60 ?15.0 Ref.   ?50   L Median - Digit II (Antidromic)      Wrist Dig II 2.97 3.75 15.6 Wrist - Dig II 13  44      Ref.   ?3.60 ?15.0 Ref.   ?50   R Ulnar - Digit V (Antidromic)      Wrist Dig V 2.14 2.86 13.6 Wrist - Dig V 11  52      Ref.   ?3.10 ?12.0 Ref.   ?50   L Ulnar - Digit V (Antidromic)      Wrist Dig V 1.82 2.71 22.3 Wrist - Dig V 11  60      Ref.   ?3.10 ?12.0 Ref.   ?50   R Median - Mixed Palmar      Med - Palm Wrist 2.34 2.92 10.2 Med - Palm - Wrist 14  60      Uln - Palm Wrist 1.46 2.03 12.5 Uln - Palm - Wrist 14  96        Med - Palm - Uln - Palm  0.89         Ref.  ?0.60    L Median - Mixed Palmar      Med - Palm Wrist 2.03 2.66 22.1 Med - Palm - Wrist 14  69      Uln - Palm Wrist 1.46 1.93 14.8 Uln - Palm - Wrist 14  96        Med - Palm - Uln - Palm  0.73         Ref.  ?0.60        Motor NCS      Nerve / Sites Muscle Latency Ref. Amplitude Ref. Amp % Duration Segments Distance Lat Diff Velocity Ref.     ms ms mV mV % ms  cm ms m/s m/s   R Median - APB      Wrist APB 3.96 ?4.00 11.7 ?6.0 100 6.61 Wrist - APB          Elbow APB 7.92  11.3  96.5 6.88  Elbow - Wrist 21 3.96 53 ?50   L Median - APB      Wrist APB 3.70 ?4.00 10.7 ?6.0 100 6.41 Wrist - APB          Elbow APB 7.45  10.1  94.5 6.98 Elbow - Wrist 20 3.75 53 ?50   R Ulnar - ADM      Wrist ADM 2.55 ?3.10 10.1 ?7.0 100 5.26 Wrist - ADM          B.Elbow ADM 5.42  9.5  94 5.47 B.Elbow - Wrist 18 2.86 63 ?50      A.Elbow ADM 7.66  8.6  85.2 5.36 A.Elbow - B.Elbow 12 2.24 54    L Ulnar - ADM      Wrist ADM 2.29 ?3.10 9.7 ?7.0 100 5.89 Wrist - ADM          B.Elbow ADM 5.42  9.0  93.3 6.04 B.Elbow - Wrist 20.5 3.13 66 ?50      A.Elbow ADM 7.81  8.5  87.8 6.30 A.Elbow - B.Elbow 11 2.40 46        EMG Summary Table     Spontaneous Recruitment Activation Duration Amplitude Polyphasia Comment   Muscle Ins Act Fib Fasc Pattern - - - - -   L. First dorsal interosseous Normal 0 0 Normal Normal Normal Normal Normal Normal   L. Abductor pollicis brevis Normal 0 0 Sl Dec Normal Normal Normal Normal Normal   L. Pronator teres Normal 0 0 Sl Dec Normal Normal Normal Normal Normal   L. Flexor digitorum profundus (Ulnar) Normal 0 0 Normal Normal Normal Normal Normal Normal   L. Extensor digitorum communis Normal 0 0 Normal Normal Normal Normal Normal Normal   L. Biceps brachii Normal 0 0 Sl Dec Normal Normal N/+1 Normal Normal   L. Triceps brachii Normal 0 0 Mod Dec Normal N/+1 N/+1 Normal Normal   L. Deltoid Normal 0 0 Normal Normal Normal Normal Normal Normal   L. Cervical paraspinals Normal 0 0      Normal   R. First dorsal interosseous Normal 0 0 Normal Normal Normal Normal Normal Normal   R. Abductor pollicis brevis Normal 0 0 Sl Dec Normal Normal Normal Normal Normal   R. Pronator teres Normal 0 0 Sl Dec Normal +1 +1 N/+1 Normal   R. Flexor digitorum profundus (Ulnar) Normal 0 0 Normal Normal Normal Normal Normal Normal   R. Extensor digitorum communis Normal 0 0 Normal Normal Normal Normal Normal Normal   R. Biceps brachii Normal 0 0 Normal Normal Normal Normal Normal Normal   R. Triceps brachii Normal 0 0 Sl Dec Normal  Normal N/+1 Normal Normal   R. Deltoid Normal 0 0 Sl Dec Normal N/+1 N/+1 Normal Normal   R. Cervical paraspinals Normal 0 0      Normal         Summary:  Nerve conduction studies were performed on both upper extremities.  Right median sensory response was prolonged with a reduced amplitude.  Left median sensory response was prolonged with normal amplitude.  Bilateral ulnar sensory responses were normal in amplitude and latency.  Right median motor response was normal in amplitude, latency and velocity.  Left median motor response was normal in amplitude, latency and velocity.  Right ulnar motor study was normal in amplitude and latency with slowing of velocity across the elbow.  Left ulnar motor study was normal in amplitude and latency with slowing of velocity across the elbow.  Due to concerns for carpal tunnel syndrome further internal comparison studies were performed.  Bilateral median versus ulnar mixed palmar sensory comparison studies revealed a prolonged median sensory latency as compared to the ulnar.  Needle EMG was performed in both upper extremities and cervical paraspinal muscles.  No active denervation was present in any muscle tested.  Slightly reduced recruitment was noted in the left abductor pollicis brevis and pronator teres muscles.  Motor units were enlarged with reduced recruitment in the left biceps and triceps muscles.  Slight decreased recruitment was noted in the right abductor pollicis brevis muscle.  Motor units were large long and poly phasic with reduced recruitment in the right pronator teres muscle.  Motor units were enlarged with reduced recruitment in the right triceps and deltoid muscles.  All other motor unit morphology and recruitment patterns were normal.    Impression:  This is an abnormal EMG of both upper extremities.  There is electrophysiologic evidence of the followin. Chronic, mild, right median mononeuropathy across the wrist (carpal tunnel syndrome) without  active denervation.  2. Chronic, mild, left median mononeuropathy across the wrist (carpal tunnel syndrome) without active denervation.  3. There is evidence that is suggestive, but not diagnostic of a very mild, bilateral ulnar neuropathies across the elbows without active denervation.  Clinical correlation is recommended.  4. Chronic, mild, left C6 radiculopathy without active denervation.  5. Chronic, mild, right C6 radiculopathy without active denervation.  There is no evidence of any other focal neuropathy, peripheral neuropathy, plexopathy or radiculopathy on the study.      Thank you for referring to the [facility] in [place].  Please feel free to contact the clinic if you have any further questions regarding this study or report.         ____________________________  Rach Jasso D.O., ABPN, AOBNP, ABSITA

## 2020-06-15 NOTE — TELEPHONE ENCOUNTER
----- Message from Brigida Lopez sent at 6/15/2020  3:46 PM CDT -----  Regarding: Patient Results EMG  Type: Needs Medical Advice  Who Called:  Pt  Best Call Back Number: [phone]  Additional Information: patient needs to know if he needs an appt to go over his EMG results

## 2020-06-16 NOTE — TELEPHONE ENCOUNTER
He has evidence of mild bilateral carpal tunnel syndrome and also evidence of something compression the nerves coming from the neck at C6. Arrange ordered MRI of the neck and a follow-up with me afterwards to go over treatment options.

## 2020-06-16 NOTE — TELEPHONE ENCOUNTER
Spoke to pt regarding EMG results. Scheduled pt for MRI and f/u appt. Pt verbalized understanding.

## 2020-07-01 ENCOUNTER — HOSPITAL ENCOUNTER (OUTPATIENT)
Dept: RADIOLOGY | Facility: HOSPITAL | Age: 60
Discharge: HOME | End: 2020-07-01
Attending: FAMILY MEDICINE
Payer: COMMERCIAL

## 2020-07-01 DIAGNOSIS — M54.12 RADICULOPATHY, CERVICAL REGION: ICD-10-CM

## 2020-07-01 PROCEDURE — 72141 MRI NECK SPINE W/O DYE: CPT | Mod: 26,,, | Performed by: RADIOLOGY

## 2020-07-01 PROCEDURE — 72141 MRI NECK SPINE W/O DYE: CPT | Mod: TC,PO

## 2020-07-01 PROCEDURE — 72141 MRI CERVICAL SPINE WITHOUT CONTRAST: ICD-10-PCS | Mod: 26,,, | Performed by: RADIOLOGY

## 2020-07-15 ENCOUNTER — OFFICE VISIT (OUTPATIENT)
Dept: FAMILY MEDICINE | Facility: CLINIC | Age: 60
End: 2020-07-15
Payer: COMMERCIAL

## 2020-07-15 VITALS
DIASTOLIC BLOOD PRESSURE: 80 MMHG | WEIGHT: 187.81 LBS | HEART RATE: 61 BPM | HEIGHT: 66 IN | TEMPERATURE: 98 F | OXYGEN SATURATION: 97 % | BODY MASS INDEX: 30.18 KG/M2 | SYSTOLIC BLOOD PRESSURE: 118 MMHG

## 2020-07-15 DIAGNOSIS — M47.22 OSTEOARTHRITIS OF SPINE WITH RADICULOPATHY, CERVICAL REGION: Primary | ICD-10-CM

## 2020-07-15 DIAGNOSIS — M54.12 RADICULOPATHY, CERVICAL REGION: ICD-10-CM

## 2020-07-15 PROCEDURE — 99213 PR OFFICE/OUTPT VISIT, EST, LEVL III, 20-29 MIN: ICD-10-PCS | Mod: S$GLB,,, | Performed by: FAMILY MEDICINE

## 2020-07-15 PROCEDURE — 99213 OFFICE O/P EST LOW 20 MIN: CPT | Mod: S$GLB,,, | Performed by: FAMILY MEDICINE

## 2020-07-15 PROCEDURE — 99999 PR PBB SHADOW E&M-EST. PATIENT-LVL IV: ICD-10-PCS | Mod: PBBFAC,,, | Performed by: FAMILY MEDICINE

## 2020-07-15 PROCEDURE — 3008F BODY MASS INDEX DOCD: CPT | Mod: CPTII,S$GLB,, | Performed by: FAMILY MEDICINE

## 2020-07-15 PROCEDURE — 99999 PR PBB SHADOW E&M-EST. PATIENT-LVL IV: CPT | Mod: PBBFAC,,, | Performed by: FAMILY MEDICINE

## 2020-07-15 PROCEDURE — 3008F PR BODY MASS INDEX (BMI) DOCUMENTED: ICD-10-PCS | Mod: CPTII,S$GLB,, | Performed by: FAMILY MEDICINE

## 2020-07-15 RX ORDER — GABAPENTIN 300 MG/1
300 CAPSULE ORAL NIGHTLY
Qty: 30 CAPSULE | Refills: 11 | Status: SHIPPED | OUTPATIENT
Start: 2020-07-15 | End: 2021-05-19 | Stop reason: SDUPTHER

## 2020-07-15 NOTE — PROGRESS NOTES
Subjective:       Patient ID: Efra Corey is a 60 y.o. male.    Chief Complaint: Follow-up (MRI, NERVE TEST)    Here for f/u on recent NCS and cervical MRI    Still reports some persistent bilateral arm pain which comes and goes. It is worse on the left.  With dull aching and numbness in both arms and hands      Past Medical History:    Colon polyp - colonoscopy                                                   Past Surgical History:    KNEE ARTHROSCOPY W/ ACL RECONSTRUCTION                       Comment:left; Dr. Rock Alanis    MOUTH SURGERY                                                  No Known Allergies    Social History    Marital Status: Single              Spouse Name:                       Years of Education:                 Number of children: 2             Occupational History  Occupation          Employer            Comment               [employer]                         Social History Main Topics    Smoking Status: Never Smoker                      Smokeless Status: Never Used                        Alcohol Use: Yes                Comment: social    Drug Use: No              Sexual Activity: Not on file          Other Topics            Concern    None on file    Social History Narrative    : wife  of breast cancer      Exercise: regular      From [place], [place]    Current Outpatient Medications on File Prior to Visit:  acyclovir (ZOVIRAX) 400 MG tablet, TAKE 1 TABLET (400 MG TOTAL) BY MOUTH 2 (TWO) TIMES DAILY., Disp: 60 tablet, Rfl: 9  sildenafil (REVATIO) 20 mg Tab, Take 1 tablet (20 mg total) by mouth 3 (three) times daily., Disp: 30 tablet, Rfl: 5    No current facility-administered medications on file prior to visit.     No family history on file.              Follow-up  Associated symptoms include arthralgias (shoulders and knees) and neck pain. Pertinent negatives include no abdominal pain, chest pain, chills, coughing, fever, headaches, nausea, rash, sore throat or  "weakness.     Review of Systems   Constitutional: Negative for appetite change, chills, fever and unexpected weight change.   HENT: Negative for sore throat and trouble swallowing.    Eyes: Negative for pain and visual disturbance.   Respiratory: Negative for cough, chest tightness, shortness of breath and wheezing.    Cardiovascular: Negative for chest pain, palpitations and leg swelling.   Gastrointestinal: Negative for abdominal pain, blood in stool, constipation, diarrhea and nausea.   Genitourinary: Negative for difficulty urinating, dysuria and hematuria.   Musculoskeletal: Positive for arthralgias (shoulders and knees), neck pain and neck stiffness. Negative for gait problem.   Skin: Negative for rash and wound.   Neurological: Negative for dizziness, weakness, light-headedness and headaches.   Hematological: Negative for adenopathy.   Psychiatric/Behavioral: Negative for dysphoric mood.       Objective:       /80 (BP Location: Left arm, Patient Position: Sitting)   Pulse 61   Temp 97.8 °F (36.6 °C) (Oral)   Ht 5' 6" (1.676 m)   Wt 85.2 kg (187 lb 13.3 oz)   SpO2 97%   BMI 30.32 kg/m²     Physical Exam   Constitutional: He is oriented to person, place, and time. He appears well-developed and well-nourished. He is active. No distress.   HENT:   Head: Normocephalic and atraumatic.   Right Ear: External ear normal.   Left Ear: External ear normal.   Mouth/Throat: Uvula is midline, oropharynx is clear and moist and mucous membranes are normal. No oropharyngeal exudate.   Eyes: Pupils are equal, round, and reactive to light. Conjunctivae, EOM and lids are normal.   Neck: Normal range of motion, full passive range of motion without pain and phonation normal. Neck supple. No thyroid mass and no thyromegaly present.   Cardiovascular: Normal rate, regular rhythm, normal heart sounds and intact distal pulses. Exam reveals no gallop and no friction rub.   No murmur heard.  Pulmonary/Chest: Effort normal and " breath sounds normal. No respiratory distress. He has no wheezes. He has no rales.   Abdominal: Soft. Bowel sounds are normal. He exhibits no distension and no mass. There is no abdominal tenderness. There is no rebound and no guarding.   Musculoskeletal:      Right shoulder: He exhibits decreased range of motion and crepitus. He exhibits no tenderness, no bony tenderness and normal strength.      Left shoulder: He exhibits decreased range of motion and crepitus. He exhibits no tenderness, no bony tenderness and normal strength.      Right knee: He exhibits abnormal patellar mobility (some crepitus). He exhibits normal range of motion.      Left knee: He exhibits decreased range of motion (limited extension). Tenderness found. Medial joint line and lateral joint line tenderness noted.      Cervical back: He exhibits decreased range of motion. He exhibits no spasm.   Lymphadenopathy:     He has no cervical adenopathy.   Neurological: He is alert and oriented to person, place, and time. No cranial nerve deficit. Coordination normal.   Skin: Skin is warm and dry.   Psychiatric: He has a normal mood and affect. His speech is normal and behavior is normal. Judgment and thought content normal.     positive Tinnels bilaterally (worse on the left)    Results for orders placed or performed in visit on 05/19/20   Comprehensive metabolic panel   Result Value Ref Range    Sodium 141 136 - 145 mmol/L    Potassium 4.2 3.5 - 5.1 mmol/L    Chloride 108 95 - 110 mmol/L    CO2 24 23 - 29 mmol/L    Glucose 146 (H) 70 - 110 mg/dL    BUN, Bld 18 6 - 20 mg/dL    Creatinine 1.3 0.5 - 1.4 mg/dL    Calcium 9.1 8.7 - 10.5 mg/dL    Total Protein 7.1 6.0 - 8.4 g/dL    Albumin 4.1 3.5 - 5.2 g/dL    Total Bilirubin 0.5 0.1 - 1.0 mg/dL    Alkaline Phosphatase 65 55 - 135 U/L    AST 24 10 - 40 U/L    ALT 22 10 - 44 U/L    Anion Gap 9 8 - 16 mmol/L    eGFR if African American >60.0 >60 mL/min/1.73 m^2    eGFR if non  59.7 (A) >60  mL/min/1.73 m^2   Lipid Panel   Result Value Ref Range    Cholesterol 185 120 - 199 mg/dL    Triglycerides 119 30 - 150 mg/dL    HDL 58 40 - 75 mg/dL    LDL Cholesterol 103.2 63.0 - 159.0 mg/dL    Hdl/Cholesterol Ratio 31.4 20.0 - 50.0 %    Total Cholesterol/HDL Ratio 3.2 2.0 - 5.0    Non-HDL Cholesterol 127 mg/dL   PSA, Screening   Result Value Ref Range    PSA, SCREEN 1.1 0.00 - 4.00 ng/mL   TSH   Result Value Ref Range    TSH 1.642 0.400 - 4.000 uIU/mL     Cervical MRI   1. Multilevel degenerative change of the cervical spine resulting in multilevel central canal and neuroforaminal stenosis.  2. Moderate overall central canal stenosis at C3-C4, moderate-severe central canal stenosis at C4-C5, moderate-severe central canal stenosis at C5-C6, and mild-moderate central canal stenosis at C6-C7.  3. Moderate-severe left neuroforaminal stenosis at C4-C5, severe left neuroforaminal stenosis at C5-C6, moderate right neuroforaminal stenosis at C5-C6, and moderate-severe right neuroforaminal stenosis at C6-C7.  Please correlate clinically for symptoms referable to the left C5, left and right C6, and right C7 nerves.  4. 3 mm retrolisthesis of C4 on C5.  5. Sinus disease.    EMG:NCS reviewed  Impression:  This is an abnormal EMG of both upper extremities.  There is electrophysiologic evidence of the followin. Chronic, mild, right median mononeuropathy across the wrist (carpal tunnel syndrome) without active denervation.  2. Chronic, mild, left median mononeuropathy across the wrist (carpal tunnel syndrome) without active denervation.  3. There is evidence that is suggestive, but not diagnostic of a very mild, bilateral ulnar neuropathies across the elbows without active denervation.  Clinical correlation is recommended.  4. Chronic, mild, left C6 radiculopathy without active denervation.  5. Chronic, mild, right C6 radiculopathy without active denervation.    Assessment:       1. Osteoarthritis of spine with  radiculopathy, cervical region    2. Radiculopathy, cervical region        Plan:       Osteoarthritis of spine with radiculopathy, cervical region  -     gabapentin (NEURONTIN) 300 MG capsule; Take 1 capsule (300 mg total) by mouth every evening.  Dispense: 30 capsule; Refill: 11  -     Ambulatory referral/consult to Physical/Occupational Therapy; Future; Expected date: 07/22/2020    Radiculopathy, cervical region  -     gabapentin (NEURONTIN) 300 MG capsule; Take 1 capsule (300 mg total) by mouth every evening.  Dispense: 30 capsule; Refill: 11  -     Ambulatory referral/consult to Physical/Occupational Therapy; Future; Expected date: 07/22/2020    will try conservative management  Trial of PT and medication  basic wrist and neck stretching.  Counseled on regular exercise, maintenance of a healthy weight, balanced diet rich in fruits/vegetables and lean protein, and avoidance of unhealthy habits like smoking and excessive alcohol intake.

## 2020-08-17 ENCOUNTER — CLINICAL SUPPORT (OUTPATIENT)
Dept: REHABILITATION | Facility: HOSPITAL | Age: 60
End: 2020-08-17
Attending: FAMILY MEDICINE
Payer: COMMERCIAL

## 2020-08-17 DIAGNOSIS — M54.12 RADICULOPATHY, CERVICAL REGION: ICD-10-CM

## 2020-08-17 DIAGNOSIS — M47.22 OSTEOARTHRITIS OF SPINE WITH RADICULOPATHY, CERVICAL REGION: ICD-10-CM

## 2020-08-17 DIAGNOSIS — M25.619 DECREASED RANGE OF MOTION OF SHOULDER, UNSPECIFIED LATERALITY: ICD-10-CM

## 2020-08-17 DIAGNOSIS — R29.898 DECREASED GRIP STRENGTH: ICD-10-CM

## 2020-08-17 DIAGNOSIS — M54.2 NECK PAIN: ICD-10-CM

## 2020-08-17 DIAGNOSIS — R29.898 DECREASED RANGE OF MOTION OF NECK: ICD-10-CM

## 2020-08-17 PROCEDURE — 97162 PT EVAL MOD COMPLEX 30 MIN: CPT | Mod: PO | Performed by: PHYSICAL THERAPIST

## 2020-08-17 NOTE — PLAN OF CARE
"[facility]  Physical Therapy Initial Evaluation    Name: Efra Corey  Clinic Number: [id number]    Therapy Diagnosis:   Encounter Diagnoses   Name Primary?    Osteoarthritis of spine with radiculopathy, cervical region     Radiculopathy, cervical region     Decreased range of motion of shoulder, unspecified laterality     Decreased range of motion of neck     Neck pain     Decreased  strength      Physician: Fito Morris MD    Physician Orders: PT Eval and Treat   Post Surgical? No Eval and Treat Yes Type of Therapy Outpatient Therapy Location: Neck   Medical Diagnosis from Referral: M47.22 (ICD-10-CM) - Osteoarthritis of spine with radiculopathy, cervical region M54.12 (ICD-10-CM) - Radiculopathy, cervical region   Evaluation Date: 8/17/2020  Authorization Period Expiration: 08/17/2020   Plan of Care Expiration: 10/16/2020  Visit # / Visits authorized: 1/ 1    Time In: 800  Time Out: 830  Total Billable Time: 25 minutes    Precautions: Standard and cancer    Subjective   Date of onset: chronic for years, increasing over the last 6-8 mo  History of current condition - Man reports: chronic shoulder arm pain, both hands R>L foot. He is R hand dominant. 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 He has not dropped objects, but has reinforced single hand  strength with opposite hand. He has arthritis and CTS. He denies RAHUL. Sensation is like "hitting funny bone, but it never goes away." Not ulnar or median nerve specific.     Medical History:   Past Medical History:   Diagnosis Date    Colon polyp     DJD (degenerative joint disease) of knee        Surgical History:   Efra Corey  has a past surgical history that includes Knee arthroscopy w/ ACL reconstruction (2006); Mouth surgery; Skin cancer excision; Colonoscopy; and Colonoscopy (N/A, 11/5/2018).    Medications:   Efra has a current medication list which includes the following prescription(s): acyclovir, " gabapentin, and sildenafil.    Allergies:   Review of patient's allergies indicates:  No Known Allergies     Imaging, MRI studies: without contrast 7/1/2020: FINDINGS:  The visualized posterior fossa structures including the anisha, mid brain, and cerebellum are unremarkable.  No Chiari malformation.  There is mucoperiosteal thickening noted at the floor of both maxillary sinuses.     The cervical vertebral bodies show normal height and signal intensity without evidence of acute compression fracture or pathologic marrow replacement process.  There is a 5 mm T1 hypointense focus of signal abnormality observed in the superior aspect of the C5 vertebral body (series 2 and 3, image 6), most likely a bone island.  There is a 3 mm retrolisthesis of C4 on C5.     There is degenerative disc desiccation, marginal osteophyte formation, and disc space narrowing present at C3-C4 through C6-C7, most pronounced at C4-C5..     The cervical spinal cord is normal in signal intensity without evidence of cord edema, myelomalacia, or cord syrinx.  No epidural fluid collections or masses.     The incidentally observed soft tissues of the neck show no significant abnormalities.     C2-C3: There is ligamentum flavum thickening.  There is right uncovertebral spurring.  No disc protrusion or extrusion. No central canal stenosis or neuroforaminal stenosis.     C3-C4: There is a bulging posterior disc osteophyte complex with a superimposed broad central disc protrusion which effaces the anterior CSF sleeve and flattens the ventral cord.  There is moderate overall central canal stenosis.  There is bilateral facet arthropathy, left greater than right, resulting in moderate left neuroforaminal stenosis.  No right neuroforaminal stenosis.     C4-C5: There is mild ligamentum flavum thickening.  There is a bulging posterior disc osteophyte complex present which flattens the ventral cord and effaces the anterior CSF sleeve.  There is moderate-severe  overall central canal stenosis.  There is bilateral uncovertebral spurring and degenerative facet arthropathy resulting moderate-severe left and mild-moderate right neuroforaminal stenosis.  Please correlate clinically for symptoms referable to the left C5 nerve.  No abnormal cord signal.     C5-C6: There is prominent bilateral hypertrophic facet arthropathy and uncovertebral spurring, left greater than right.  There is severe left neuroforaminal stenosis and moderate right neuroforaminal stenosis.  Please correlate clinically for symptoms referable to the left C6 nerve.  There is ligamentum flavum thickening and a posterior disc osteophyte complex which effaces the anterior CSF sleeve and flattens the ventral cord.  There is moderate-severe overall central canal stenosis.  No abnormal cord signal.     C6-C7: There is a posterior disc osteophyte complex and bilateral uncovertebral spurring.  There is moderate-severe right and mild-moderate left neuroforaminal stenosis.  There is ligamentum flavum thickening.  There is mild-moderate overall central canal stenosis.     C7-T1: No disc protrusion or extrusion. No central canal stenosis or neuroforaminal stenosis.     Impression:     1. Multilevel degenerative change of the cervical spine resulting in multilevel central canal and neuroforaminal stenosis.  2. Moderate overall central canal stenosis at C3-C4, moderate-severe central canal stenosis at C4-C5, moderate-severe central canal stenosis at C5-C6, and mild-moderate central canal stenosis at C6-C7.  3. Moderate-severe left neuroforaminal stenosis at C4-C5, severe left neuroforaminal stenosis at C5-C6, moderate right neuroforaminal stenosis at C5-C6, and moderate-severe right neuroforaminal stenosis at C6-C7.  Please correlate clinically for symptoms referable to the left C5, left and right C6, and right C7 nerves.  4. 3 mm retrolisthesis of C4 on C5.  5. Sinus disease.    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   Prior Level of Function: 15+ years but worsening  Current Level of Function: increasing UE weakness and pn    Pain:  Current 5/10, worst 5/10, best 3/10   Location: bilateral arms  Description: Aching, Dull, Burning and Numb  Aggravating Factors: Extension  Easing Factors: rest    Pts goals: to dec sx and improve strength    Objective     Observation: FHP, RS, thorough hx provider     Posture: fair    Cervical Range of Motion:    Degrees Pain   Flexion 44 neg     Extension 58 +     Right Rotation na na     Left Rotation na na     Right Side Bending 34 +   Left Side Bending 28 ++      Shoulder Range of Motion:   Shoulder Left Right   Flexion wnl wnl   Abduction wnl wnl   ER wnl wnl   IR wnl wnl     Upper Extremity Strength  (R) UE  (L) UE    Shoulder flexion: 4/5 Shoulder flexion: 4/5   Shoulder Abduction: 4/5 Shoulder abduction: 4/5   Shoulder ER 4/5 Shoulder ER 4+/5   Shoulder IR 4/5 Shoulder IR 4-/5   Elbow flexion: 4+/5 Elbow flexion: 4+/5   Elbow extension: 4+/5 Elbow extension: 4+/5    4/5 : 4/5     Special Tests:  Distraction neg   Compression pos   Spurlings Pos B     Upper Limb Neurodynamic testing:   R and L       UNT +   MNT +     Palpation: neg      Sensation: wnl    CMS Impairment/Limitation/Restriction for FOTO NECK Survey    Therapist reviewed FOTO scores for Efra Corey on 8/17/2020.   FOTO documents entered into [employer] - see Media section.    Limitation Score: 50%         TREATMENT   Treatment Time In: 820  Treatment Time Out: 828  Total Treatment time separate from Evaluation: 06 minutes    Man participated in neuromuscular re-education activities to improve: neural flossing for 06 minutes. The following activities were included:  Ulnar nerve glides x10  Median nerve glides x 10    Home Exercises and Patient Education Provided    Education provided:   - HEP  - Pt/family was provided educational information, including: role  of PT, role of pt/caregiver, goals for PT, POC, scheduling, and attendance policy.- pt verbalized understanding.    Written Home Exercises Provided: Patient instructed to cont prior HEP.  Exercises were reviewed and Man was able to demonstrate them prior to the end of the session.  Man demonstrated good  understanding of the education provided.     See EMR under Patient Instructions for exercises provided 8/17/2020.    Assessment   Efra is a 60 y.o. male referred to outpatient Physical Therapy with a medical diagnosis of M47.22 (ICD-10-CM) - Osteoarthritis of spine with radiculopathy, cervical region M54.12 (ICD-10-CM) - Radiculopathy, cervical region   . Pt presents with impaired cx AROM, neural tension, UE strength, and posture. He also has impaired  strength impacting functional activities.    Pt prognosis is Good.   Pt will benefit from skilled outpatient Physical Therapy to address the deficits stated above and in the chart below, provide pt/family education, and to maximize pt's level of independence.     Plan of care discussed with patient: Yes  Pt's spiritual, cultural and educational needs considered and patient is agreeable to the plan of care and goals as stated below:     Anticipated Barriers for therapy: see imaging    Medical Necessity is demonstrated by the following  History  Co-morbidities and personal factors that may impact the plan of care Co-morbidities:   see below    Past Medical History:   Diagnosis Date    Colon polyp     DJD (degenerative joint disease) of knee      Personal Factors:   age  lifestyle     high   Examination  Body Structures and Functions, activity limitations and participation restrictions that may impact the plan of care Body Regions:   neck  upper extremities    Body Systems:    ROM  strength  motor control  motor learning    Participation Restrictions:   Impaired , pn with neck ext    Activity limitations:   Learning and applying knowledge  no  deficits    General Tasks and Commands  no deficits    Communication  no deficits    Mobility  lifting and carrying objects    Self care  caring for body parts (brushing teeth, shaving, grooming)    Domestic Life  doing house work (cleaning house, washing dishes, laundry)    Interactions/Relationships  no deficits    Life Areas  no deficits    Community and Social Life  no deficits         moderate   Clinical Presentation evolving clinical presentation with changing clinical characteristics moderate   Decision Making/ Complexity Score: moderate     Goals:  Short Term Goals: 4 weeks   -Improve FOTO impairment score to 45% for improved  strength, shoulder ROM, and strength.  -Improve AROM cx neck flex to 50 deg towards accepted norms.  -Improve L lat flex cx AROM to 34 deg for symmetry and improved m. Length.  -L shoulder IR strength to 4/5.    Long Term Goals: 8 weeks   -B shoulder strength grossly 4+/5 for improved flexibility and strength.  -Neg ULTT median and ulnar n. Tension.  -Improve FOTO impairment score to 38% for improved QOL.  -Pt will have generally less neck pain with cx AROM most of the time.  -Pt will demo 4+/5  strength so that he does not require contralateral hand reinforcement to ensure not dropping objects.    Plan   Plan of care Certification: 8/17/2020 to 10/16/2020.    Outpatient Physical Therapy 2 times weekly for 8 weeks to include the following interventions: Electrical Stimulation IFC, Manual Therapy, Moist Heat/ Ice, Neuromuscular Re-ed, Patient Education, Self Care, Therapeutic Activites, Therapeutic Exercise and Ultrasound.     Jud Gutierrez, PT

## 2020-08-21 ENCOUNTER — CLINICAL SUPPORT (OUTPATIENT)
Dept: REHABILITATION | Facility: HOSPITAL | Age: 60
End: 2020-08-21
Attending: FAMILY MEDICINE
Payer: COMMERCIAL

## 2020-08-21 DIAGNOSIS — M25.619 DECREASED RANGE OF MOTION OF SHOULDER, UNSPECIFIED LATERALITY: ICD-10-CM

## 2020-08-21 DIAGNOSIS — M54.2 NECK PAIN: ICD-10-CM

## 2020-08-21 DIAGNOSIS — R29.898 DECREASED RANGE OF MOTION OF NECK: Primary | ICD-10-CM

## 2020-08-21 DIAGNOSIS — R29.898 DECREASED GRIP STRENGTH: ICD-10-CM

## 2020-08-21 PROCEDURE — 97110 THERAPEUTIC EXERCISES: CPT | Mod: PO | Performed by: PHYSICAL THERAPIST

## 2020-08-21 PROCEDURE — 97140 MANUAL THERAPY 1/> REGIONS: CPT | Mod: PO,97 | Performed by: PHYSICAL THERAPIST

## 2020-08-25 ENCOUNTER — CLINICAL SUPPORT (OUTPATIENT)
Dept: REHABILITATION | Facility: HOSPITAL | Age: 60
End: 2020-08-25
Attending: FAMILY MEDICINE
Payer: COMMERCIAL

## 2020-08-25 DIAGNOSIS — R29.898 DECREASED GRIP STRENGTH: ICD-10-CM

## 2020-08-25 DIAGNOSIS — R29.898 DECREASED RANGE OF MOTION OF NECK: ICD-10-CM

## 2020-08-25 DIAGNOSIS — M25.619 DECREASED RANGE OF MOTION OF SHOULDER, UNSPECIFIED LATERALITY: Primary | ICD-10-CM

## 2020-08-25 DIAGNOSIS — M54.2 NECK PAIN: ICD-10-CM

## 2020-08-25 PROCEDURE — 97110 THERAPEUTIC EXERCISES: CPT | Mod: PO | Performed by: PHYSICAL THERAPIST

## 2020-08-25 PROCEDURE — 97140 MANUAL THERAPY 1/> REGIONS: CPT | Mod: PO,97 | Performed by: PHYSICAL THERAPIST

## 2020-08-25 NOTE — PROGRESS NOTES
"  Physical Therapy Daily Treatment Note     Name: Efra Corey  Clinic Number: [id number]    Therapy Diagnosis:   Encounter Diagnoses   Name Primary?    Decreased range of motion of shoulder, unspecified laterality Yes    Decreased range of motion of neck     Neck pain     Decreased  strength      Physician: Fito Morris MD    Visit Date: 8/25/2020  Physician Orders: PT Eval and Treat   Post Surgical? No Eval and Treat Yes Type of Therapy Outpatient Therapy Location: Neck   Medical Diagnosis from Referral: M47.22 (ICD-10-CM) - Osteoarthritis of spine with radiculopathy, cervical region M54.12 (ICD-10-CM) - Radiculopathy, cervical region   Evaluation Date: 8/17/2020  Authorization Period Expiration: 08/17/2020, 12/31/2020  Plan of Care Expiration: 10/16/2020  Visit # / Visits authorized: 1/ 1, 2/20     Time In: 1647  Time Out: 1732  Total Billable Time: 40 minutes    Precautions: Standard and cancer    Subjective     Pt reports: only had 2 nights of nerve pn. Better sx.  He was compliant with home exercise program.  Response to previous treatment: no adverse effect  Functional change: too early    Pain: 0/10 at rest  Location: bilateral neck      Objective     Man received therapeutic exercises to develop strength, ROM, flexibility and posture for 37 minutes including:    Flexibility:  UT stretch 3x30"  Levator stretch 3x30"  Ulnar nerve flossing  Median nerve flossing  Radial nerve flossing  Wrist extensor stretch 3x30" with bent/not bent elbow  Prayer stretch 3x30"  Reverse prayer stretch 3x30"    Passive UT, chest, and scalene stretching 2x30" each     Strengthening: not performed  Rows green band 2x10  Shoulder ER red band 2x10 B  Tricep press green band 2x10  Bicep curls red band 2x10    Man received the following manual therapy techniques: Joint mobilizations and Soft tissue Mobilization were applied to the: neck and shoulders for 08 minutes, including:  Up slide massage L  Downslide grade " II-III R    Home Exercises Provided and Patient Education Provided     Education provided:   - Cont HEP  - add wrist stretches  - handouts to be given next session    Written Home Exercises Provided: Patient instructed to cont prior HEP.  Exercises were reviewed and Man was able to demonstrate them prior to the end of the session.  Man demonstrated good  understanding of the education provided.     See EMR under Patient Instructions for exercises provided 8/21/2020.    Assessment     Man demonstrate improved cx AROM rot. His sx are improving in terms of shoulder and upper arms. He continues with CTS of B hands. He will benefit from shoulder strengthening and chest stretch next visit.  Man is progressing well towards his goals.   Pt prognosis is Good.     Pt will continue to benefit from skilled outpatient physical therapy to address the deficits listed in the problem list box on initial evaluation, provide pt/family education and to maximize pt's level of independence in the home and community environment.     Pt's spiritual, cultural and educational needs considered and pt agreeable to plan of care and goals.    Anticipated barriers to physical therapy: see imaging    Goals:   Short Term Goals: 4 weeks, progressing   -Improve FOTO impairment score to 45% for improved  strength, shoulder ROM, and strength.  -Improve AROM cx neck flex to 50 deg towards accepted norms.  -Improve L lat flex cx AROM to 34 deg for symmetry and improved m. Length.  -L shoulder IR strength to 4/5.     Long Term Goals: 8 weeks, progressing   -B shoulder strength grossly 4+/5 for improved flexibility and strength.  -Neg ULTT median and ulnar n. Tension.  -Improve FOTO impairment score to 38% for improved QOL.  -Pt will have generally less neck pain with cx AROM most of the time.  -Pt will demo 4+/5  strength so that he does not require contralateral hand reinforcement to ensure not dropping objects.       Plan     Plan of  care Certification: 8/17/2020 to 10/16/2020.     Outpatient Physical Therapy 2 times weekly for 8 weeks to include the following interventions: Electrical Stimulation IFC, Manual Therapy, Moist Heat/ Ice, Neuromuscular Re-ed, Patient Education, Self Care, Therapeutic Activites, Therapeutic Exercise and Ultrasound.    Jud Gutierrez, PT

## 2020-08-27 ENCOUNTER — CLINICAL SUPPORT (OUTPATIENT)
Dept: REHABILITATION | Facility: HOSPITAL | Age: 60
End: 2020-08-27
Attending: FAMILY MEDICINE
Payer: COMMERCIAL

## 2020-08-27 DIAGNOSIS — M54.2 NECK PAIN: ICD-10-CM

## 2020-08-27 DIAGNOSIS — R29.898 DECREASED GRIP STRENGTH: ICD-10-CM

## 2020-08-27 DIAGNOSIS — M25.619 DECREASED RANGE OF MOTION OF SHOULDER, UNSPECIFIED LATERALITY: Primary | ICD-10-CM

## 2020-08-27 DIAGNOSIS — R29.898 DECREASED RANGE OF MOTION OF NECK: ICD-10-CM

## 2020-08-27 PROCEDURE — 97110 THERAPEUTIC EXERCISES: CPT | Mod: PO | Performed by: PHYSICAL THERAPIST

## 2020-08-27 PROCEDURE — 97140 MANUAL THERAPY 1/> REGIONS: CPT | Mod: PO,97 | Performed by: PHYSICAL THERAPIST

## 2020-08-27 NOTE — PROGRESS NOTES
"  Physical Therapy Daily Treatment Note     Name: Efra Corey  Clinic Number: [id number]    Therapy Diagnosis:   Encounter Diagnoses   Name Primary?    Decreased range of motion of shoulder, unspecified laterality Yes    Decreased range of motion of neck     Neck pain     Decreased  strength      Physician: Fito Morris MD    Visit Date: 8/27/2020  Physician Orders: PT Eval and Treat   Post Surgical? No Eval and Treat Yes Type of Therapy Outpatient Therapy Location: Neck   Medical Diagnosis from Referral: M47.22 (ICD-10-CM) - Osteoarthritis of spine with radiculopathy, cervical region M54.12 (ICD-10-CM) - Radiculopathy, cervical region   Evaluation Date: 8/17/2020  Authorization Period Expiration: 08/17/2020, 12/31/2020  Plan of Care Expiration: 10/16/2020  Visit # / Visits authorized: 1/ 1, 3/20     Time In: 1555  Time Out: 1640  Total Billable Time: 40 minutes    Precautions: Standard and cancer    Subjective     Pt reports: neck is a bit sore today. He does lifting for work.  He was compliant with home exercise program.  Response to previous treatment: no adverse effect  Functional change: too early    Pain: 4/10 at rest, 2/10 post tx  Location: bilateral neck      Objective     Man received therapeutic exercises to develop strength, ROM, flexibility and posture for 35 minutes including:    UBE 3'3' subjective collected  Flexibility:  UT stretch 3x30"  Levator stretch 3x30"  Ulnar nerve flossing  Median nerve flossing  Radial nerve flossing  Wrist extensor stretch 3x30" with bent/not bent elbow  Prayer stretch 3x30"  Reverse prayer stretch 3x30"    Passive UT, chest, and scalene stretching 2x30" each     Strengthening: not performed HEP given with bands  Rows green band 2x10  Shoulder ER red band 2x10 B  Tricep press green band 2x10  Bicep curls red band 2x10    Man received the following manual therapy techniques: Joint mobilizations and Soft tissue Mobilization were applied to the: neck and " shoulders for 10 minutes, including:  Up slide massage L  Downslide grade II-III R  SO distraction c/s paraspinal STM L  Flexor pollicis STM    Home Exercises Provided and Patient Education Provided     Education provided:   - Cont HEP  - handouts     Written Home Exercises Provided: Patient instructed to cont prior HEP.  Exercises were reviewed and Man was able to demonstrate them prior to the end of the session.  Man demonstrated good  understanding of the education provided.     See EMR under Patient Instructions for exercises provided 8/21/2020.    Assessment     Man demonstrate improved cx AROM rot. His sx are improving in terms of shoulder and upper arms. He continues with CTS of B hands. He will benefit from shoulder strengthening and chest stretch next visit.  Man is progressing well towards his goals.   Pt prognosis is Good.     Pt will continue to benefit from skilled outpatient physical therapy to address the deficits listed in the problem list box on initial evaluation, provide pt/family education and to maximize pt's level of independence in the home and community environment.     Pt's spiritual, cultural and educational needs considered and pt agreeable to plan of care and goals.    Anticipated barriers to physical therapy: see imaging    Goals:   Short Term Goals: 4 weeks, progressing   -Improve FOTO impairment score to 45% for improved  strength, shoulder ROM, and strength.  -Improve AROM cx neck flex to 50 deg towards accepted norms.  -Improve L lat flex cx AROM to 34 deg for symmetry and improved m. Length.  -L shoulder IR strength to 4/5.     Long Term Goals: 8 weeks, progressing   -B shoulder strength grossly 4+/5 for improved flexibility and strength.  -Neg ULTT median and ulnar n. Tension.  -Improve FOTO impairment score to 38% for improved QOL.  -Pt will have generally less neck pain with cx AROM most of the time.  -Pt will demo 4+/5  strength so that he does not require  contralateral hand reinforcement to ensure not dropping objects.       Plan     Plan of care Certification: 8/17/2020 to 10/16/2020.     Outpatient Physical Therapy 2 times weekly for 8 weeks to include the following interventions: Electrical Stimulation IFC, Manual Therapy, Moist Heat/ Ice, Neuromuscular Re-ed, Patient Education, Self Care, Therapeutic Activites, Therapeutic Exercise and Ultrasound.    Jud Gutierrez, PT

## 2020-09-01 ENCOUNTER — CLINICAL SUPPORT (OUTPATIENT)
Dept: REHABILITATION | Facility: HOSPITAL | Age: 60
End: 2020-09-01
Attending: FAMILY MEDICINE
Payer: COMMERCIAL

## 2020-09-01 DIAGNOSIS — R29.898 DECREASED RANGE OF MOTION OF NECK: ICD-10-CM

## 2020-09-01 DIAGNOSIS — M54.2 NECK PAIN: ICD-10-CM

## 2020-09-01 DIAGNOSIS — R29.898 DECREASED GRIP STRENGTH: ICD-10-CM

## 2020-09-01 DIAGNOSIS — M25.619 DECREASED RANGE OF MOTION OF SHOULDER, UNSPECIFIED LATERALITY: ICD-10-CM

## 2020-09-01 PROCEDURE — 97110 THERAPEUTIC EXERCISES: CPT | Mod: PO,CQ,97

## 2020-09-01 PROCEDURE — 97140 MANUAL THERAPY 1/> REGIONS: CPT | Mod: PO,CQ

## 2020-09-01 NOTE — PROGRESS NOTES
"  Physical Therapy Daily Treatment Note     Name: Efra Corey  Clinic Number: [id number]    Therapy Diagnosis:   Encounter Diagnoses   Name Primary?    Decreased range of motion of shoulder, unspecified laterality     Decreased range of motion of neck     Neck pain     Decreased  strength      Physician: Fito Morris MD    Visit Date: 9/1/2020  Physician Orders: PT Eval and Treat   Post Surgical? No Eval and Treat Yes Type of Therapy Outpatient Therapy Location: Neck   Medical Diagnosis from Referral: M47.22 (ICD-10-CM) - Osteoarthritis of spine with radiculopathy, cervical region M54.12 (ICD-10-CM) - Radiculopathy, cervical region   Evaluation Date: 8/17/2020  Authorization Period Expiration: 08/17/2020, 12/31/2020  Plan of Care Expiration: 10/16/2020  Visit # / Visits authorized: 1/ 1, 3/20     Time In: 5:30  Time Out: 6:15  Total Billable Time: 45 minutes    Precautions: Standard and cancer    Subjective     Pt reports: that he is w/o pn today. He reports that his sx have improved, he is sleeping better at night and UE sx have greatly reduced.  He was compliant with home exercise program.  Response to previous treatment: no adverse effect  Functional change: too early    Pain: 0/10 at rest, 0/10 post tx  Location: bilateral neck      Objective     Man received therapeutic exercises to develop strength, ROM, flexibility and posture for 35 minutes including:    UBE 3'3' subjective collected  Flexibility:  UT stretch 3x30"  Levator stretch 3x30"  Ulnar nerve flossing  Median nerve flossing  Radial nerve flossing  Wrist extensor stretch 3x30" with bent/not bent elbow  Prayer stretch 3x30"  Reverse prayer stretch 3x30"    Passive UT, chest, and scalene stretching 2x30" each     Strengthening:   Rows green band 2x10  Shoulder ER red band 2x10 B  Tricep press green band 2x10  Bicep curls red band 2x10    Man received the following manual therapy techniques: Joint mobilizations and Soft tissue " Mobilization were applied to the: neck and shoulders for 10 minutes, including:  Up slide massage L  Downslide grade II-III R  SO distraction c/s paraspinal STM L  Flexor pollicis STM NP    Home Exercises Provided and Patient Education Provided     Education provided:   - Cont HEP  - handouts     Written Home Exercises Provided: Patient instructed to cont prior HEP.  Exercises were reviewed and Man was able to demonstrate them prior to the end of the session.  Man demonstrated good  understanding of the education provided.     See EMR under Patient Instructions for exercises provided 8/21/2020.    Assessment     Man mandy today's tx well w/o provocation of sx. He demonstrates good effort and form with all activities. His cervical AROM appears good and pn free today. He is limited by some gen tightness with stretching   aMn is progressing well towards his goals.   Pt prognosis is Good.     Pt will continue to benefit from skilled outpatient physical therapy to address the deficits listed in the problem list box on initial evaluation, provide pt/family education and to maximize pt's level of independence in the home and community environment.     Pt's spiritual, cultural and educational needs considered and pt agreeable to plan of care and goals.    Anticipated barriers to physical therapy: see imaging    Goals:   Short Term Goals: 4 weeks, progressing   -Improve FOTO impairment score to 45% for improved  strength, shoulder ROM, and strength.  -Improve AROM cx neck flex to 50 deg towards accepted norms.  -Improve L lat flex cx AROM to 34 deg for symmetry and improved m. Length.  -L shoulder IR strength to 4/5.     Long Term Goals: 8 weeks, progressing   -B shoulder strength grossly 4+/5 for improved flexibility and strength.  -Neg ULTT median and ulnar n. Tension.  -Improve FOTO impairment score to 38% for improved QOL.  -Pt will have generally less neck pain with cx AROM most of the time.  -Pt will demo  4+/5  strength so that he does not require contralateral hand reinforcement to ensure not dropping objects.       Plan     Plan of care Certification: 8/17/2020 to 10/16/2020.     Outpatient Physical Therapy 2 times weekly for 8 weeks to include the following interventions: Electrical Stimulation IFC, Manual Therapy, Moist Heat/ Ice, Neuromuscular Re-ed, Patient Education, Self Care, Therapeutic Activites, Therapeutic Exercise and Ultrasound.    Fracisco Zuluaga, PTA

## 2020-09-03 ENCOUNTER — CLINICAL SUPPORT (OUTPATIENT)
Dept: REHABILITATION | Facility: HOSPITAL | Age: 60
End: 2020-09-03
Attending: FAMILY MEDICINE
Payer: COMMERCIAL

## 2020-09-03 DIAGNOSIS — M54.2 NECK PAIN: ICD-10-CM

## 2020-09-03 DIAGNOSIS — M25.619 DECREASED RANGE OF MOTION OF SHOULDER, UNSPECIFIED LATERALITY: Primary | ICD-10-CM

## 2020-09-03 DIAGNOSIS — R29.898 DECREASED GRIP STRENGTH: ICD-10-CM

## 2020-09-03 DIAGNOSIS — R29.898 DECREASED RANGE OF MOTION OF NECK: ICD-10-CM

## 2020-09-03 PROCEDURE — 97110 THERAPEUTIC EXERCISES: CPT | Mod: PO | Performed by: PHYSICAL THERAPIST

## 2020-09-03 PROCEDURE — 97140 MANUAL THERAPY 1/> REGIONS: CPT | Mod: PO | Performed by: PHYSICAL THERAPIST

## 2020-09-03 NOTE — PROGRESS NOTES
"  Physical Therapy Daily Treatment Note     Name: Efra Corey  Clinic Number: [id number]    Therapy Diagnosis:   Encounter Diagnoses   Name Primary?    Decreased range of motion of shoulder, unspecified laterality Yes    Decreased range of motion of neck     Neck pain     Decreased  strength      Physician: Fito Morris MD    Visit Date: 9/3/2020  Physician Orders: PT Eval and Treat   Post Surgical? No Eval and Treat Yes Type of Therapy Outpatient Therapy Location: Neck   Medical Diagnosis from Referral: M47.22 (ICD-10-CM) - Osteoarthritis of spine with radiculopathy, cervical region M54.12 (ICD-10-CM) - Radiculopathy, cervical region   Evaluation Date: 8/17/2020  Authorization Period Expiration: 08/17/2020, 12/31/2020  Plan of Care Expiration: 10/16/2020  Visit # / Visits authorized: 1/ 1, 4/20     Time In: 1600  Time Out: 1645  Total Billable Time: 40 minutes    Precautions: Standard and cancer    Subjective     Pt reports: that he is w/o pn today. He reports that his sx have improved, he is sleeping better at night and UE sx have greatly reduced.  He was compliant with home exercise program.  Response to previous treatment: no adverse effect  Functional change: too early    Pain: 0/10 at rest, 0/10 post tx  Location: bilateral neck      Objective     Man received therapeutic exercises to develop strength, ROM, flexibility and posture for 35 minutes including:    UBE 3'3' subjective collected  Flexibility:  UT stretch 3x30"  Levator stretch 3x30"  Ulnar nerve flossing  Median nerve flossing  Radial nerve flossing  Wrist extensor stretch 3x30" with bent/not bent elbow  Prayer stretch 3x30"  Reverse prayer stretch 3x30"    Passive UT, chest, and scalene stretching 2x30" each     Strengthening:   Rows blue band 2x10  Shoulder ER green band 2x10 B  Tricep press blue band 2x10  Bicep curls green band 2x10    Man received the following manual therapy techniques: Joint mobilizations and Soft tissue " Mobilization were applied to the: neck and shoulders for 08 minutes, including:  SO distraction   c/s paraspinal STM L  Flexor pollicis STM     Home Exercises Provided and Patient Education Provided     Education provided:   - Cont HEP  - handouts     Written Home Exercises Provided: Patient instructed to cont prior HEP.  Exercises were reviewed and Man was able to demonstrate them prior to the end of the session.  Man demonstrated good  understanding of the education provided.     See EMR under Patient Instructions for exercises provided 8/21/2020.    Assessment     Man mandy today's tx well w/o provocation of sx. He demonstrates good effort and form with all activities. His cervical AROM was limited but improved post tx. He was also sore. He is limited by some gen tightness with stretching. Will reassess next visit with PT.  Man is progressing well towards his goals.   Pt prognosis is Good.     Pt will continue to benefit from skilled outpatient physical therapy to address the deficits listed in the problem list box on initial evaluation, provide pt/family education and to maximize pt's level of independence in the home and community environment.     Pt's spiritual, cultural and educational needs considered and pt agreeable to plan of care and goals.    Anticipated barriers to physical therapy: see imaging    Goals:   Short Term Goals: 4 weeks, progressing   -Improve FOTO impairment score to 45% for improved  strength, shoulder ROM, and strength.  -Improve AROM cx neck flex to 50 deg towards accepted norms.  -Improve L lat flex cx AROM to 34 deg for symmetry and improved m. Length.  -L shoulder IR strength to 4/5.     Long Term Goals: 8 weeks, progressing   -B shoulder strength grossly 4+/5 for improved flexibility and strength.  -Neg ULTT median and ulnar n. Tension.  -Improve FOTO impairment score to 38% for improved QOL.  -Pt will have generally less neck pain with cx AROM most of the time.  -Pt will  demo 4+/5  strength so that he does not require contralateral hand reinforcement to ensure not dropping objects.       Plan     Plan of care Certification: 8/17/2020 to 10/16/2020.     Outpatient Physical Therapy 2 times weekly for 8 weeks to include the following interventions: Electrical Stimulation IFC, Manual Therapy, Moist Heat/ Ice, Neuromuscular Re-ed, Patient Education, Self Care, Therapeutic Activites, Therapeutic Exercise and Ultrasound.    Jud Gutierrez, PT

## 2020-09-08 ENCOUNTER — CLINICAL SUPPORT (OUTPATIENT)
Dept: REHABILITATION | Facility: HOSPITAL | Age: 60
End: 2020-09-08
Attending: FAMILY MEDICINE
Payer: COMMERCIAL

## 2020-09-08 DIAGNOSIS — M25.619 DECREASED RANGE OF MOTION OF SHOULDER, UNSPECIFIED LATERALITY: Primary | ICD-10-CM

## 2020-09-08 DIAGNOSIS — R29.898 DECREASED GRIP STRENGTH: ICD-10-CM

## 2020-09-08 DIAGNOSIS — M54.2 NECK PAIN: ICD-10-CM

## 2020-09-08 DIAGNOSIS — R29.898 DECREASED RANGE OF MOTION OF NECK: ICD-10-CM

## 2020-09-08 PROCEDURE — 97110 THERAPEUTIC EXERCISES: CPT | Mod: PO,97 | Performed by: PHYSICAL THERAPIST

## 2020-09-08 NOTE — PROGRESS NOTES
Outpatient Therapy Discharge Summary     Name: Efra Corey  [facility] Number: [id number]    Therapy Diagnosis:   Encounter Diagnoses   Name Primary?    Decreased range of motion of shoulder, unspecified laterality Yes    Decreased range of motion of neck     Neck pain     Decreased  strength      Physician: Fito Morris MD    Physician Orders: PT Eval and Treat   Post Surgical? No Eval and Treat Yes Type of Therapy Outpatient Therapy Location: Neck   Medical Diagnosis from Referral: M47.22 (ICD-10-CM) - Osteoarthritis of spine with radiculopathy, cervical region M54.12 (ICD-10-CM) - Radiculopathy, cervical region   Evaluation Date: 8/17/2020    Date of Last visit: 9/8/2020  Total Visits Received: 5  Cancelled Visits: 1  No Show Visits: 0    Assessment    Goals: see below    Discharge reason: Patient is now asymptomatic and Other:  And met most goals.    Plan   This patient is discharged from Physical Therapy.    Physical Therapy Daily Treatment Note     Name: Efra Corey  [facility] [place] Number: [id number]    Therapy Diagnosis:   Encounter Diagnoses   Name Primary?    Decreased range of motion of shoulder, unspecified laterality Yes    Decreased range of motion of neck     Neck pain     Decreased  strength      Physician: Fito Morris MD    Visit Date: 9/8/2020  Physician Orders: PT Eval and Treat   Post Surgical? No Eval and Treat Yes Type of Therapy Outpatient Therapy Location: Neck   Medical Diagnosis from Referral: M47.22 (ICD-10-CM) - Osteoarthritis of spine with radiculopathy, cervical region M54.12 (ICD-10-CM) - Radiculopathy, cervical region   Evaluation Date: 8/17/2020  Authorization Period Expiration: 08/17/2020, 12/31/2020  Plan of Care Expiration: 10/16/2020  Visit # / Visits authorized: 1/ 1, 5/20     Time In: 1700  Time Out: 1738  Total Billable Time: 35 minutes    Precautions: Standard and cancer    Subjective     Pt reports: that he is w/o pn today. He reports that his sx  "have improved, he is sleeping better at night and UE sx have greatly reduced. He only has neck soreness and would like to be discharged.  He was compliant with home exercise program.  Response to previous treatment: no adverse effect  Functional change: see goals    Pain: 0/10 at rest, 0/10 post tx  Location: bilateral neck      Objective     Man received therapeutic exercises to develop strength, ROM, flexibility and posture for 38 minutes including:    UBE 3'3' subjective collected  Flexibility:  UT stretch 3x30"  Levator stretch 3x30"  Ulnar nerve flossing  Median nerve flossing  Radial nerve flossing  Reassessment  Passive UT, chest, and scalene stretching 2x30" each     Home Exercises Provided and Patient Education Provided     Education provided:   - Cont HEP  - Handouts     Written Home Exercises Provided: Patient instructed to cont prior HEP.  Exercises were reviewed and Man was able to demonstrate them prior to the end of the session.  Man demonstrated good  understanding of the education provided.     See EMR under Patient Instructions for exercises provided 8/21/2020.    Assessment     Man mandy today's tx well w/o provocation of sx. He demonstrates good effort and form with all activities. His cervical AROM was limited but improved post tx. He was also sore. He has met almost all of his goals, but states that his shoulders have RTC tears.  He is comfortable continuing HEP.    Man is progressing well towards his goals.   Pt prognosis is Good.     Pt will continue to benefit from skilled outpatient physical therapy to address the deficits listed in the problem list box on initial evaluation, provide pt/family education and to maximize pt's level of independence in the home and community environment.     Pt's spiritual, cultural and educational needs considered and pt agreeable to plan of care and goals.    Anticipated barriers to physical therapy: see imaging    Goals:   Short Term Goals: 4 weeks, " progressing   -Improve FOTO impairment score to 45% for improved  strength, shoulder ROM, and strength.  MET, 9/8/2020, 37%  -Improve AROM cx neck flex to 50 deg towards accepted norms.  MET, 9/8/2020, 50 DEG.  -Improve L lat flex cx AROM to 34 deg for symmetry and improved m. Length.  MET, 9/8/2020, 50 DEG.  -L shoulder IR strength to 4/5.  MET 9/8/20. 4/5     Long Term Goals: 8 weeks, progressing   -B shoulder strength grossly 4+/5 for improved flexibility and strength.  FLEX AND ABD 4+/5. SEE ABOVE  -Neg ULTT median and ulnar n. Tension.  MET, 9/8/2020.  -Improve FOTO impairment score to 38% for improved QOL.  MET, 9/8/2020, 37%  -Pt will have generally less neck pain with cx AROM most of the time.  MET, 9/8/2020.  -Pt will demo 4+/5  strength so that he does not require contralateral hand reinforcement to ensure not dropping objects.  MET, 9/8/2020. Reports he has not dropped or felt like he will drop objects.    Plan     Plan of care Certification: 8/17/2020 to 10/16/2020.     Outpatient Physical Therapy 2 times weekly for 8 weeks to include the following interventions: Electrical Stimulation IFC, Manual Therapy, Moist Heat/ Ice, Neuromuscular Re-ed, Patient Education, Self Care, Therapeutic Activites, Therapeutic Exercise and Ultrasound.    Jud Gutierrez, PT

## 2020-09-08 NOTE — PLAN OF CARE
Outpatient Therapy Discharge Summary   Name: Efra Corey   [facility] Number: [id number]   Therapy Diagnosis:        Encounter Diagnoses   Name Primary?    Decreased range of motion of shoulder, unspecified laterality Yes    Decreased range of motion of neck     Neck pain     Decreased  strength    Physician: Fito Morris MD   Physician Orders: PT Eval and Treat   Post Surgical? No Eval and Treat Yes Type of Therapy Outpatient Therapy Location: Neck   Medical Diagnosis from Referral: M47.22 (ICD-10-CM) - Osteoarthritis of spine with radiculopathy, cervical region M54.12 (ICD-10-CM) - Radiculopathy, cervical region   Evaluation Date: 8/17/2020   Date of Last visit: 9/8/2020   Total Visits Received: 5   Cancelled Visits: 1   No Show Visits: 0   Assessment    Goals: see below   Discharge reason: Patient is now asymptomatic and Other: And met most goals.   Plan   This patient is discharged from Physical Therapy.   Physical Therapy Daily Treatment Note   Name: Efra Corey   [facility] Number: [id number]   Therapy Diagnosis:        Encounter Diagnoses   Name Primary?    Decreased range of motion of shoulder, unspecified laterality Yes    Decreased range of motion of neck     Neck pain     Decreased  strength    Physician: Fito Morris MD   Visit Date: 9/8/2020   Physician Orders: PT Eval and Treat   Post Surgical? No Eval and Treat Yes Type of Therapy Outpatient Therapy Location: Neck   Medical Diagnosis from Referral: M47.22 (ICD-10-CM) - Osteoarthritis of spine with radiculopathy, cervical region M54.12 (ICD-10-CM) - Radiculopathy, cervical region   Evaluation Date: 8/17/2020   Authorization Period Expiration: 08/17/2020, 12/31/2020   Plan of Care Expiration: 10/16/2020   Visit # / Visits authorized: 1/ 1, 5/20   Time In: 1700   Time Out: 1738   Total Billable Time: 35 minutes   Precautions: Standard and cancer   Subjective   Pt reports: that he is w/o pn today. He reports that his sx have  "improved, he is sleeping better at night and UE sx have greatly reduced. He only has neck soreness and would like to be discharged.   He was compliant with home exercise program.   Response to previous treatment: no adverse effect   Functional change: see goals   Pain: 0/10 at rest, 0/10 post tx   Location: bilateral neck   Objective   Man received therapeutic exercises to develop strength, ROM, flexibility and posture for 38 minutes including:   UBE 3'3' subjective collected   Flexibility:   UT stretch 3x30"   Levator stretch 3x30"   Ulnar nerve flossing   Median nerve flossing   Radial nerve flossing   Reassessment   Passive UT, chest, and scalene stretching 2x30" each   Home Exercises Provided and Patient Education Provided   Education provided:   - Cont HEP   - Handouts   Written Home Exercises Provided: Patient instructed to cont prior HEP.   Exercises were reviewed and Man was able to demonstrate them prior to the end of the session. Man demonstrated good understanding of the education provided.   See EMR under Patient Instructions for exercises provided 8/21/2020.   Assessment   Man mandy today's tx well w/o provocation of sx. He demonstrates good effort and form with all activities. His cervical AROM was limited but improved post tx. He was also sore. He has met almost all of his goals, but states that his shoulders have RTC tears. He is comfortable continuing HEP.   Man is progressing well towards his goals.   Pt prognosis is Good.   Pt will continue to benefit from skilled outpatient physical therapy to address the deficits listed in the problem list box on initial evaluation, provide pt/family education and to maximize pt's level of independence in the home and community environment.   Pt's spiritual, cultural and educational needs considered and pt agreeable to plan of care and goals.   Anticipated barriers to physical therapy: see imaging   Goals:   Short Term Goals: 4 weeks, progressing "   -Improve FOTO impairment score to 45% for improved  strength, shoulder ROM, and strength.   MET, 9/8/2020, 37%   -Improve AROM cx neck flex to 50 deg towards accepted norms.   MET, 9/8/2020, 50 DEG.   -Improve L lat flex cx AROM to 34 deg for symmetry and improved m. Length.   MET, 9/8/2020, 50 DEG.   -L shoulder IR strength to 4/5.   MET 9/8/20. 4/5   Long Term Goals: 8 weeks, progressing   -B shoulder strength grossly 4+/5 for improved flexibility and strength.   FLEX AND ABD 4+/5. SEE ABOVE   -Neg ULTT median and ulnar n. Tension.   MET, 9/8/2020.   -Improve FOTO impairment score to 38% for improved QOL.   MET, 9/8/2020, 37%   -Pt will have generally less neck pain with cx AROM most of the time.   MET, 9/8/2020.   -Pt will demo 4+/5  strength so that he does not require contralateral hand reinforcement to ensure not dropping objects.   MET, 9/8/2020. Reports he has not dropped or felt like he will drop objects.   Plan   Plan of care Certification: 8/17/2020 to 10/16/2020.   Outpatient Physical Therapy 2 times weekly for 8 weeks to include the following interventions: Electrical Stimulation IFC, Manual Therapy, Moist Heat/ Ice, Neuromuscular Re-ed, Patient Education, Self Care, Therapeutic Activites, Therapeutic Exercise and Ultrasound.   Jud Gutierrez, PT

## 2020-10-15 RX ORDER — ACYCLOVIR 400 MG/1
TABLET ORAL
Qty: 180 TABLET | Refills: 2 | Status: SHIPPED | OUTPATIENT
Start: 2020-10-15 | End: 2022-05-12

## 2020-10-15 NOTE — PROGRESS NOTES
Refill Authorization Note   Efra Corey is requesting a refill authorization.  Brief assessment and rationale for refill: Approve     Medication Therapy Plan: CDMR. Labs waived for PRN meds.     Medication reconciliation completed: No   Comments:   Orders Placed This Encounter    acyclovir (ZOVIRAX) 400 MG tablet      Requested Prescriptions   Signed Prescriptions Disp Refills    acyclovir (ZOVIRAX) 400 MG tablet 180 tablet 2     Sig: TAKE 1 TABLET BY MOUTH TWICE A DAY       Antimicrobials:  Oral Antiviral Agents - Anti-Herpetic Failed - 10/15/2020 10:08 AM        Failed - WBC in normal range and within 360 days     No results found for: EXTWBC, POCWBC, WBC           Passed - Patient is at least 18 years old        Passed - Office Visit within last 12 months or future 90 days.     Recent Outpatient Visits            3 months ago Osteoarthritis of spine with radiculopathy, cervical region    [facility] Fito Morris MD    5 months ago [facility] Fito Morris MD    1 year ago [facility] Fito Morris MD    1 year ago Traumatic arthritis of knee, left    [facility] Susanna Alanis MD    1 year ago Traumatic arthritis of knee, left    [facility] Susanna Alanis MD                    Passed - Cr is 1.3 or below and within 360 days     Creatinine   Date Value Ref Range Status   05/19/2020 1.3 0.5 - 1.4 mg/dL Final   08/30/2019 1.4 0.5 - 1.4 mg/dL Final   11/20/2017 1.4 0.5 - 1.4 mg/dL Final              Passed - eGFR within 360 days     eGFR if non    Date Value Ref Range Status   05/19/2020 59.7 (A) >60 mL/min/1.73 m^2 Final     Comment:     Calculation used to obtain the estimated glomerular filtration  rate (eGFR) is the CKD-EPI equation.      08/30/2019 55.0 (A) >60 mL/min/1.73 m^2 Final     Comment:     Calculation used to obtain the estimated  glomerular filtration  rate (eGFR) is the CKD-EPI equation.      11/20/2017 55.8 (A) >60 mL/min/1.73 m^2 Final     Comment:     Calculation used to obtain the estimated glomerular filtration  rate (eGFR) is the CKD-EPI equation.        eGFR if    Date Value Ref Range Status   05/19/2020 >60.0 >60 mL/min/1.73 m^2 Final   08/30/2019 >60.0 >60 mL/min/1.73 m^2 Final   11/20/2017 >60.0 >60 mL/min/1.73 m^2 Final                  Appointments  past 12m or future 3m with PCP    Date Provider   Last Visit   7/15/2020 Fito Morris MD   Next Visit   Visit date not found Fito Morris MD   ED visits in past 90 days: 0     Note composed:1:07 PM 10/15/2020

## 2020-10-15 NOTE — TELEPHONE ENCOUNTER
No new care gaps identified.  Powered by [employer]. Reference number: [id number]. 10/15/2020 10:09:12 AM   LINDAT

## 2021-01-15 ENCOUNTER — PATIENT OUTREACH (OUTPATIENT)
Dept: ADMINISTRATIVE | Facility: HOSPITAL | Age: 61
End: 2021-01-15

## 2021-02-04 ENCOUNTER — HOSPITAL ENCOUNTER (OUTPATIENT)
Dept: RADIOLOGY | Facility: HOSPITAL | Age: 61
Discharge: HOME | End: 2021-02-04
Attending: FAMILY MEDICINE
Payer: COMMERCIAL

## 2021-02-04 ENCOUNTER — OFFICE VISIT (OUTPATIENT)
Dept: FAMILY MEDICINE | Facility: CLINIC | Age: 61
End: 2021-02-04
Payer: COMMERCIAL

## 2021-02-04 VITALS
BODY MASS INDEX: 30.47 KG/M2 | RESPIRATION RATE: 18 BRPM | HEART RATE: 63 BPM | DIASTOLIC BLOOD PRESSURE: 60 MMHG | SYSTOLIC BLOOD PRESSURE: 118 MMHG | WEIGHT: 189.63 LBS | TEMPERATURE: 98 F | HEIGHT: 66 IN

## 2021-02-04 DIAGNOSIS — G57.61 MORTON'S NEUROMA OF RIGHT FOOT: ICD-10-CM

## 2021-02-04 DIAGNOSIS — M79.671 FOOT PAIN, RIGHT: ICD-10-CM

## 2021-02-04 DIAGNOSIS — M79.671 FOOT PAIN, RIGHT: Primary | ICD-10-CM

## 2021-02-04 PROCEDURE — 90471 IMMUNIZATION ADMIN: CPT | Mod: S$GLB,,, | Performed by: FAMILY MEDICINE

## 2021-02-04 PROCEDURE — 1126F PR PAIN SEVERITY QUANTIFIED, NO PAIN PRESENT: ICD-10-PCS | Mod: S$GLB,,, | Performed by: FAMILY MEDICINE

## 2021-02-04 PROCEDURE — 90686 IIV4 VACC NO PRSV 0.5 ML IM: CPT | Mod: S$GLB,,, | Performed by: FAMILY MEDICINE

## 2021-02-04 PROCEDURE — 90686 FLU VACCINE (QUAD) GREATER THAN OR EQUAL TO 3YO PRESERVATIVE FREE IM: ICD-10-PCS | Mod: S$GLB,,, | Performed by: FAMILY MEDICINE

## 2021-02-04 PROCEDURE — 90471 FLU VACCINE (QUAD) GREATER THAN OR EQUAL TO 3YO PRESERVATIVE FREE IM: ICD-10-PCS | Mod: S$GLB,,, | Performed by: FAMILY MEDICINE

## 2021-02-04 PROCEDURE — 3008F PR BODY MASS INDEX (BMI) DOCUMENTED: ICD-10-PCS | Mod: CPTII,S$GLB,, | Performed by: FAMILY MEDICINE

## 2021-02-04 PROCEDURE — 99213 OFFICE O/P EST LOW 20 MIN: CPT | Mod: 25,S$GLB,, | Performed by: FAMILY MEDICINE

## 2021-02-04 PROCEDURE — 99999 PR PBB SHADOW E&M-EST. PATIENT-LVL III: ICD-10-PCS | Mod: PBBFAC,,, | Performed by: FAMILY MEDICINE

## 2021-02-04 PROCEDURE — 73630 X-RAY EXAM OF FOOT: CPT | Mod: TC,FY,PO,RT

## 2021-02-04 PROCEDURE — 73630 X-RAY EXAM OF FOOT: CPT | Mod: 26,RT,, | Performed by: RADIOLOGY

## 2021-02-04 PROCEDURE — 3008F BODY MASS INDEX DOCD: CPT | Mod: CPTII,S$GLB,, | Performed by: FAMILY MEDICINE

## 2021-02-04 PROCEDURE — 99999 PR PBB SHADOW E&M-EST. PATIENT-LVL III: CPT | Mod: PBBFAC,,, | Performed by: FAMILY MEDICINE

## 2021-02-04 PROCEDURE — 99213 PR OFFICE/OUTPT VISIT, EST, LEVL III, 20-29 MIN: ICD-10-PCS | Mod: 25,S$GLB,, | Performed by: FAMILY MEDICINE

## 2021-02-04 PROCEDURE — 73630 XR FOOT COMPLETE 3 VIEW RIGHT: ICD-10-PCS | Mod: 26,RT,, | Performed by: RADIOLOGY

## 2021-02-04 PROCEDURE — 1126F AMNT PAIN NOTED NONE PRSNT: CPT | Mod: S$GLB,,, | Performed by: FAMILY MEDICINE

## 2021-02-10 ENCOUNTER — OFFICE VISIT (OUTPATIENT)
Dept: PODIATRY | Facility: CLINIC | Age: 61
End: 2021-02-10
Payer: COMMERCIAL

## 2021-02-10 VITALS — WEIGHT: 189.63 LBS | BODY MASS INDEX: 30.47 KG/M2 | HEIGHT: 66 IN

## 2021-02-10 DIAGNOSIS — G57.61 MORTON'S NEUROMA OF RIGHT FOOT: ICD-10-CM

## 2021-02-10 DIAGNOSIS — M20.5X1 HALLUX LIMITUS, ACQUIRED, RIGHT: Primary | ICD-10-CM

## 2021-02-10 PROCEDURE — 20600 PR DRAIN/INJECT SMALL JOINT/BURSA: ICD-10-PCS | Mod: RT,S$GLB,, | Performed by: PODIATRIST

## 2021-02-10 PROCEDURE — 1125F AMNT PAIN NOTED PAIN PRSNT: CPT | Mod: S$GLB,,, | Performed by: PODIATRIST

## 2021-02-10 PROCEDURE — 99999 PR PBB SHADOW E&M-EST. PATIENT-LVL III: CPT | Mod: PBBFAC,,, | Performed by: PODIATRIST

## 2021-02-10 PROCEDURE — 1125F PR PAIN SEVERITY QUANTIFIED, PAIN PRESENT: ICD-10-PCS | Mod: S$GLB,,, | Performed by: PODIATRIST

## 2021-02-10 PROCEDURE — 99203 OFFICE O/P NEW LOW 30 MIN: CPT | Mod: 25,S$GLB,, | Performed by: PODIATRIST

## 2021-02-10 PROCEDURE — 99999 PR PBB SHADOW E&M-EST. PATIENT-LVL III: ICD-10-PCS | Mod: PBBFAC,,, | Performed by: PODIATRIST

## 2021-02-10 PROCEDURE — 3008F BODY MASS INDEX DOCD: CPT | Mod: CPTII,S$GLB,, | Performed by: PODIATRIST

## 2021-02-10 PROCEDURE — 99203 PR OFFICE/OUTPT VISIT, NEW, LEVL III, 30-44 MIN: ICD-10-PCS | Mod: 25,S$GLB,, | Performed by: PODIATRIST

## 2021-02-10 PROCEDURE — 20600 DRAIN/INJ JOINT/BURSA W/O US: CPT | Mod: RT,S$GLB,, | Performed by: PODIATRIST

## 2021-02-10 PROCEDURE — 3008F PR BODY MASS INDEX (BMI) DOCUMENTED: ICD-10-PCS | Mod: CPTII,S$GLB,, | Performed by: PODIATRIST

## 2021-02-10 RX ORDER — DEXAMETHASONE SODIUM PHOSPHATE 4 MG/ML
4 INJECTION, SOLUTION INTRA-ARTICULAR; INTRALESIONAL; INTRAMUSCULAR; INTRAVENOUS; SOFT TISSUE ONCE
Status: COMPLETED | OUTPATIENT
Start: 2021-02-10 | End: 2021-02-10

## 2021-02-10 RX ADMIN — DEXAMETHASONE SODIUM PHOSPHATE 4 MG: 4 INJECTION, SOLUTION INTRA-ARTICULAR; INTRALESIONAL; INTRAMUSCULAR; INTRAVENOUS; SOFT TISSUE at 07:02

## 2021-02-23 ENCOUNTER — OFFICE VISIT (OUTPATIENT)
Dept: GASTROENTEROLOGY | Facility: CLINIC | Age: 61
End: 2021-02-23
Payer: COMMERCIAL

## 2021-02-23 VITALS — BODY MASS INDEX: 30.69 KG/M2 | RESPIRATION RATE: 18 BRPM | HEIGHT: 66 IN | WEIGHT: 190.94 LBS

## 2021-02-23 DIAGNOSIS — K57.90 DIVERTICULOSIS: ICD-10-CM

## 2021-02-23 DIAGNOSIS — R14.3 EXCESSIVE GAS: Primary | ICD-10-CM

## 2021-02-23 DIAGNOSIS — Z86.010 HX OF COLONIC POLYPS: ICD-10-CM

## 2021-02-23 PROCEDURE — 99202 OFFICE O/P NEW SF 15 MIN: CPT | Mod: S$GLB,,, | Performed by: INTERNAL MEDICINE

## 2021-02-23 PROCEDURE — 3008F PR BODY MASS INDEX (BMI) DOCUMENTED: ICD-10-PCS | Mod: CPTII,S$GLB,, | Performed by: INTERNAL MEDICINE

## 2021-02-23 PROCEDURE — 99999 PR PBB SHADOW E&M-EST. PATIENT-LVL III: ICD-10-PCS | Mod: PBBFAC,,, | Performed by: INTERNAL MEDICINE

## 2021-02-23 PROCEDURE — 99202 PR OFFICE/OUTPT VISIT, NEW, LEVL II, 15-29 MIN: ICD-10-PCS | Mod: S$GLB,,, | Performed by: INTERNAL MEDICINE

## 2021-02-23 PROCEDURE — 3008F BODY MASS INDEX DOCD: CPT | Mod: CPTII,S$GLB,, | Performed by: INTERNAL MEDICINE

## 2021-02-23 PROCEDURE — 99999 PR PBB SHADOW E&M-EST. PATIENT-LVL III: CPT | Mod: PBBFAC,,, | Performed by: INTERNAL MEDICINE

## 2021-02-23 RX ORDER — DICYCLOMINE HYDROCHLORIDE 10 MG/1
10 CAPSULE ORAL
Qty: 120 CAPSULE | Refills: 11 | Status: SHIPPED | OUTPATIENT
Start: 2021-02-23 | End: 2021-04-12

## 2021-03-16 ENCOUNTER — IMMUNIZATION (OUTPATIENT)
Dept: PHARMACY | Facility: CLINIC | Age: 61
End: 2021-03-16
Payer: COMMERCIAL

## 2021-03-16 DIAGNOSIS — Z23 NEED FOR VACCINATION: Primary | ICD-10-CM

## 2021-03-24 ENCOUNTER — OFFICE VISIT (OUTPATIENT)
Dept: PODIATRY | Facility: CLINIC | Age: 61
End: 2021-03-24
Payer: COMMERCIAL

## 2021-03-24 DIAGNOSIS — M20.5X1 HALLUX LIMITUS, ACQUIRED, RIGHT: Primary | ICD-10-CM

## 2021-03-24 PROCEDURE — 1125F AMNT PAIN NOTED PAIN PRSNT: CPT | Mod: S$GLB,,, | Performed by: PODIATRIST

## 2021-03-24 PROCEDURE — 99213 OFFICE O/P EST LOW 20 MIN: CPT | Mod: S$GLB,,, | Performed by: PODIATRIST

## 2021-03-24 PROCEDURE — 99999 PR PBB SHADOW E&M-EST. PATIENT-LVL II: ICD-10-PCS | Mod: PBBFAC,,, | Performed by: PODIATRIST

## 2021-03-24 PROCEDURE — 1125F PR PAIN SEVERITY QUANTIFIED, PAIN PRESENT: ICD-10-PCS | Mod: S$GLB,,, | Performed by: PODIATRIST

## 2021-03-24 PROCEDURE — 99999 PR PBB SHADOW E&M-EST. PATIENT-LVL II: CPT | Mod: PBBFAC,,, | Performed by: PODIATRIST

## 2021-03-24 PROCEDURE — 99213 PR OFFICE/OUTPT VISIT, EST, LEVL III, 20-29 MIN: ICD-10-PCS | Mod: S$GLB,,, | Performed by: PODIATRIST

## 2021-04-12 ENCOUNTER — OFFICE VISIT (OUTPATIENT)
Dept: GASTROENTEROLOGY | Facility: CLINIC | Age: 61
End: 2021-04-12
Payer: COMMERCIAL

## 2021-04-12 VITALS — WEIGHT: 191.38 LBS | RESPIRATION RATE: 18 BRPM | BODY MASS INDEX: 30.76 KG/M2 | HEIGHT: 66 IN

## 2021-04-12 DIAGNOSIS — R14.3 EXCESSIVE GAS: Primary | ICD-10-CM

## 2021-04-12 DIAGNOSIS — Z86.010 HISTORY OF COLONIC POLYPS: ICD-10-CM

## 2021-04-12 DIAGNOSIS — R14.0 ABDOMINAL DISTENSION (GASEOUS): ICD-10-CM

## 2021-04-12 DIAGNOSIS — Z87.19 HISTORY OF DIVERTICULOSIS: ICD-10-CM

## 2021-04-12 PROCEDURE — 99213 OFFICE O/P EST LOW 20 MIN: CPT | Mod: S$GLB,,, | Performed by: INTERNAL MEDICINE

## 2021-04-12 PROCEDURE — 3008F BODY MASS INDEX DOCD: CPT | Mod: CPTII,S$GLB,, | Performed by: INTERNAL MEDICINE

## 2021-04-12 PROCEDURE — 3008F PR BODY MASS INDEX (BMI) DOCUMENTED: ICD-10-PCS | Mod: CPTII,S$GLB,, | Performed by: INTERNAL MEDICINE

## 2021-04-12 PROCEDURE — 99999 PR PBB SHADOW E&M-EST. PATIENT-LVL III: ICD-10-PCS | Mod: PBBFAC,,, | Performed by: INTERNAL MEDICINE

## 2021-04-12 PROCEDURE — 99999 PR PBB SHADOW E&M-EST. PATIENT-LVL III: CPT | Mod: PBBFAC,,, | Performed by: INTERNAL MEDICINE

## 2021-04-12 PROCEDURE — 99213 PR OFFICE/OUTPT VISIT, EST, LEVL III, 20-29 MIN: ICD-10-PCS | Mod: S$GLB,,, | Performed by: INTERNAL MEDICINE

## 2021-04-12 RX ORDER — DICYCLOMINE HYDROCHLORIDE 20 MG/1
20 TABLET ORAL
Qty: 240 TABLET | Refills: 5 | Status: SHIPPED | OUTPATIENT
Start: 2021-04-12 | End: 2022-04-12

## 2021-04-13 ENCOUNTER — IMMUNIZATION (OUTPATIENT)
Dept: PHARMACY | Facility: CLINIC | Age: 61
End: 2021-04-13
Payer: COMMERCIAL

## 2021-04-13 DIAGNOSIS — Z23 NEED FOR VACCINATION: Primary | ICD-10-CM

## 2021-04-20 ENCOUNTER — HOSPITAL ENCOUNTER (OUTPATIENT)
Dept: RADIOLOGY | Facility: HOSPITAL | Age: 61
Discharge: HOME | End: 2021-04-20
Attending: INTERNAL MEDICINE
Payer: COMMERCIAL

## 2021-04-20 DIAGNOSIS — R14.0 ABDOMINAL DISTENSION (GASEOUS): ICD-10-CM

## 2021-04-20 PROCEDURE — A9698 NON-RAD CONTRAST MATERIALNOC: HCPCS | Mod: PO | Performed by: INTERNAL MEDICINE

## 2021-04-20 PROCEDURE — 74178 CT ABD&PLV WO CNTR FLWD CNTR: CPT | Mod: TC,PO

## 2021-04-20 PROCEDURE — 74178 CT ABDOMEN PELVIS W WO CONTRAST: ICD-10-PCS | Mod: 26,,, | Performed by: RADIOLOGY

## 2021-04-20 PROCEDURE — 74178 CT ABD&PLV WO CNTR FLWD CNTR: CPT | Mod: 26,,, | Performed by: RADIOLOGY

## 2021-04-20 PROCEDURE — 25500020 PHARM REV CODE 255: Mod: PO | Performed by: INTERNAL MEDICINE

## 2021-04-20 RX ADMIN — IOHEXOL 100 ML: 350 INJECTION, SOLUTION INTRAVENOUS at 08:04

## 2021-04-20 RX ADMIN — IOHEXOL 1000 ML: 9 SOLUTION ORAL at 08:04

## 2021-04-29 ENCOUNTER — PATIENT MESSAGE (OUTPATIENT)
Dept: FAMILY MEDICINE | Facility: CLINIC | Age: 61
End: 2021-04-29

## 2021-04-29 DIAGNOSIS — N52.9 ERECTILE DYSFUNCTION, UNSPECIFIED ERECTILE DYSFUNCTION TYPE: ICD-10-CM

## 2021-05-03 RX ORDER — SILDENAFIL CITRATE 20 MG/1
20 TABLET ORAL 3 TIMES DAILY
Qty: 90 TABLET | Refills: 3 | Status: SHIPPED | OUTPATIENT
Start: 2021-05-03 | End: 2022-06-06 | Stop reason: SDUPTHER

## 2021-05-12 ENCOUNTER — OFFICE VISIT (OUTPATIENT)
Dept: PODIATRY | Facility: CLINIC | Age: 61
End: 2021-05-12
Payer: COMMERCIAL

## 2021-05-12 VITALS
DIASTOLIC BLOOD PRESSURE: 92 MMHG | HEIGHT: 66 IN | WEIGHT: 191.38 LBS | SYSTOLIC BLOOD PRESSURE: 145 MMHG | BODY MASS INDEX: 30.76 KG/M2 | HEART RATE: 60 BPM

## 2021-05-12 DIAGNOSIS — M20.5X1 HALLUX LIMITUS, ACQUIRED, RIGHT: Primary | ICD-10-CM

## 2021-05-12 PROCEDURE — 1125F PR PAIN SEVERITY QUANTIFIED, PAIN PRESENT: ICD-10-PCS | Mod: S$GLB,,, | Performed by: PODIATRIST

## 2021-05-12 PROCEDURE — 99213 OFFICE O/P EST LOW 20 MIN: CPT | Mod: S$GLB,,, | Performed by: PODIATRIST

## 2021-05-12 PROCEDURE — 99999 PR PBB SHADOW E&M-EST. PATIENT-LVL IV: ICD-10-PCS | Mod: PBBFAC,,, | Performed by: PODIATRIST

## 2021-05-12 PROCEDURE — 3008F PR BODY MASS INDEX (BMI) DOCUMENTED: ICD-10-PCS | Mod: CPTII,S$GLB,, | Performed by: PODIATRIST

## 2021-05-12 PROCEDURE — 3008F BODY MASS INDEX DOCD: CPT | Mod: CPTII,S$GLB,, | Performed by: PODIATRIST

## 2021-05-12 PROCEDURE — 99213 PR OFFICE/OUTPT VISIT, EST, LEVL III, 20-29 MIN: ICD-10-PCS | Mod: S$GLB,,, | Performed by: PODIATRIST

## 2021-05-12 PROCEDURE — 1125F AMNT PAIN NOTED PAIN PRSNT: CPT | Mod: S$GLB,,, | Performed by: PODIATRIST

## 2021-05-12 PROCEDURE — 99999 PR PBB SHADOW E&M-EST. PATIENT-LVL IV: CPT | Mod: PBBFAC,,, | Performed by: PODIATRIST

## 2021-05-19 ENCOUNTER — PATIENT MESSAGE (OUTPATIENT)
Dept: FAMILY MEDICINE | Facility: CLINIC | Age: 61
End: 2021-05-19

## 2021-05-19 DIAGNOSIS — M54.12 RADICULOPATHY, CERVICAL REGION: ICD-10-CM

## 2021-05-19 DIAGNOSIS — M47.22 OSTEOARTHRITIS OF SPINE WITH RADICULOPATHY, CERVICAL REGION: ICD-10-CM

## 2021-05-19 RX ORDER — GABAPENTIN 300 MG/1
300 CAPSULE ORAL NIGHTLY
Qty: 30 CAPSULE | Refills: 11 | Status: SHIPPED | OUTPATIENT
Start: 2021-05-19 | End: 2022-09-27 | Stop reason: SDUPTHER

## 2021-06-16 ENCOUNTER — OFFICE VISIT (OUTPATIENT)
Dept: FAMILY MEDICINE | Facility: CLINIC | Age: 61
End: 2021-06-16
Payer: COMMERCIAL

## 2021-06-16 ENCOUNTER — LAB VISIT (OUTPATIENT)
Dept: LAB | Facility: HOSPITAL | Age: 61
End: 2021-06-16
Attending: FAMILY MEDICINE
Payer: COMMERCIAL

## 2021-06-16 VITALS
BODY MASS INDEX: 29.72 KG/M2 | DIASTOLIC BLOOD PRESSURE: 76 MMHG | RESPIRATION RATE: 18 BRPM | OXYGEN SATURATION: 97 % | TEMPERATURE: 99 F | WEIGHT: 184.94 LBS | HEIGHT: 66 IN | HEART RATE: 59 BPM | SYSTOLIC BLOOD PRESSURE: 122 MMHG

## 2021-06-16 DIAGNOSIS — Z00.00 PREVENTATIVE HEALTH CARE: ICD-10-CM

## 2021-06-16 DIAGNOSIS — M20.5X1 HALLUX LIMITUS OF RIGHT FOOT: ICD-10-CM

## 2021-06-16 DIAGNOSIS — Z01.818 PREOP EXAMINATION: Primary | ICD-10-CM

## 2021-06-16 LAB
BASOPHILS # BLD AUTO: 0.09 K/UL (ref 0–0.2)
BASOPHILS NFR BLD: 1.2 % (ref 0–1.9)
DIFFERENTIAL METHOD: NORMAL
EOSINOPHIL # BLD AUTO: 0.4 K/UL (ref 0–0.5)
EOSINOPHIL NFR BLD: 5.3 % (ref 0–8)
ERYTHROCYTE [DISTWIDTH] IN BLOOD BY AUTOMATED COUNT: 12.9 % (ref 11.5–14.5)
HCT VFR BLD AUTO: 45.8 % (ref 40–54)
HGB BLD-MCNC: 14.9 G/DL (ref 14–18)
IMM GRANULOCYTES # BLD AUTO: 0.03 K/UL (ref 0–0.04)
IMM GRANULOCYTES NFR BLD AUTO: 0.4 % (ref 0–0.5)
LYMPHOCYTES # BLD AUTO: 1.7 K/UL (ref 1–4.8)
LYMPHOCYTES NFR BLD: 22.3 % (ref 18–48)
MCH RBC QN AUTO: 29.8 PG (ref 27–31)
MCHC RBC AUTO-ENTMCNC: 32.5 G/DL (ref 32–36)
MCV RBC AUTO: 92 FL (ref 82–98)
MONOCYTES # BLD AUTO: 0.7 K/UL (ref 0.3–1)
MONOCYTES NFR BLD: 9.2 % (ref 4–15)
NEUTROPHILS # BLD AUTO: 4.6 K/UL (ref 1.8–7.7)
NEUTROPHILS NFR BLD: 61.6 % (ref 38–73)
NRBC BLD-RTO: 0 /100 WBC
PLATELET # BLD AUTO: 205 K/UL (ref 150–450)
PMV BLD AUTO: 10.7 FL (ref 9.2–12.9)
RBC # BLD AUTO: 5 M/UL (ref 4.6–6.2)
WBC # BLD AUTO: 7.4 K/UL (ref 3.9–12.7)

## 2021-06-16 PROCEDURE — 84443 ASSAY THYROID STIM HORMONE: CPT | Performed by: FAMILY MEDICINE

## 2021-06-16 PROCEDURE — 99213 PR OFFICE/OUTPT VISIT, EST, LEVL III, 20-29 MIN: ICD-10-PCS | Mod: S$GLB,,, | Performed by: FAMILY MEDICINE

## 2021-06-16 PROCEDURE — 1126F PR PAIN SEVERITY QUANTIFIED, NO PAIN PRESENT: ICD-10-PCS | Mod: S$GLB,,, | Performed by: FAMILY MEDICINE

## 2021-06-16 PROCEDURE — 80061 LIPID PANEL: CPT | Performed by: FAMILY MEDICINE

## 2021-06-16 PROCEDURE — 36415 COLL VENOUS BLD VENIPUNCTURE: CPT | Mod: PO | Performed by: FAMILY MEDICINE

## 2021-06-16 PROCEDURE — 3008F BODY MASS INDEX DOCD: CPT | Mod: CPTII,S$GLB,, | Performed by: FAMILY MEDICINE

## 2021-06-16 PROCEDURE — 1126F AMNT PAIN NOTED NONE PRSNT: CPT | Mod: S$GLB,,, | Performed by: FAMILY MEDICINE

## 2021-06-16 PROCEDURE — 99999 PR PBB SHADOW E&M-EST. PATIENT-LVL III: CPT | Mod: PBBFAC,,, | Performed by: FAMILY MEDICINE

## 2021-06-16 PROCEDURE — 85025 COMPLETE CBC W/AUTO DIFF WBC: CPT | Performed by: FAMILY MEDICINE

## 2021-06-16 PROCEDURE — 80053 COMPREHEN METABOLIC PANEL: CPT | Performed by: FAMILY MEDICINE

## 2021-06-16 PROCEDURE — 99999 PR PBB SHADOW E&M-EST. PATIENT-LVL III: ICD-10-PCS | Mod: PBBFAC,,, | Performed by: FAMILY MEDICINE

## 2021-06-16 PROCEDURE — 84153 ASSAY OF PSA TOTAL: CPT | Performed by: FAMILY MEDICINE

## 2021-06-16 PROCEDURE — 3008F PR BODY MASS INDEX (BMI) DOCUMENTED: ICD-10-PCS | Mod: CPTII,S$GLB,, | Performed by: FAMILY MEDICINE

## 2021-06-16 PROCEDURE — 99213 OFFICE O/P EST LOW 20 MIN: CPT | Mod: S$GLB,,, | Performed by: FAMILY MEDICINE

## 2021-06-17 LAB
ALBUMIN SERPL BCP-MCNC: 4.2 G/DL (ref 3.5–5.2)
ALP SERPL-CCNC: 69 U/L (ref 55–135)
ALT SERPL W/O P-5'-P-CCNC: 20 U/L (ref 10–44)
ANION GAP SERPL CALC-SCNC: 11 MMOL/L (ref 8–16)
AST SERPL-CCNC: 22 U/L (ref 10–40)
BILIRUB SERPL-MCNC: 0.5 MG/DL (ref 0.1–1)
BUN SERPL-MCNC: 19 MG/DL (ref 6–20)
CALCIUM SERPL-MCNC: 9.6 MG/DL (ref 8.7–10.5)
CHLORIDE SERPL-SCNC: 107 MMOL/L (ref 95–110)
CHOLEST SERPL-MCNC: 213 MG/DL (ref 120–199)
CHOLEST/HDLC SERPL: 4.3 {RATIO} (ref 2–5)
CO2 SERPL-SCNC: 22 MMOL/L (ref 23–29)
COMPLEXED PSA SERPL-MCNC: 1.1 NG/ML (ref 0–4)
CREAT SERPL-MCNC: 1.3 MG/DL (ref 0.5–1.4)
EST. GFR  (AFRICAN AMERICAN): >60 ML/MIN/1.73 M^2
EST. GFR  (NON AFRICAN AMERICAN): 59.3 ML/MIN/1.73 M^2
GLUCOSE SERPL-MCNC: 87 MG/DL (ref 70–110)
HDLC SERPL-MCNC: 49 MG/DL (ref 40–75)
HDLC SERPL: 23 % (ref 20–50)
LDLC SERPL CALC-MCNC: 140 MG/DL (ref 63–159)
NONHDLC SERPL-MCNC: 164 MG/DL
POTASSIUM SERPL-SCNC: 5.4 MMOL/L (ref 3.5–5.1)
PROT SERPL-MCNC: 7.5 G/DL (ref 6–8.4)
SODIUM SERPL-SCNC: 140 MMOL/L (ref 136–145)
TRIGL SERPL-MCNC: 120 MG/DL (ref 30–150)
TSH SERPL DL<=0.005 MIU/L-ACNC: 2.32 UIU/ML (ref 0.4–4)

## 2021-07-02 ENCOUNTER — ANESTHESIA EVENT (OUTPATIENT)
Dept: SURGERY | Facility: HOSPITAL | Age: 61
End: 2021-07-02
Payer: COMMERCIAL

## 2021-07-06 ENCOUNTER — HOSPITAL ENCOUNTER (OUTPATIENT)
Facility: HOSPITAL | Age: 61
Discharge: HOME | End: 2021-07-06
Attending: PODIATRIST | Admitting: PODIATRIST
Payer: COMMERCIAL

## 2021-07-06 ENCOUNTER — ANESTHESIA (OUTPATIENT)
Dept: SURGERY | Facility: HOSPITAL | Age: 61
End: 2021-07-06
Payer: COMMERCIAL

## 2021-07-06 ENCOUNTER — HOSPITAL ENCOUNTER (OUTPATIENT)
Dept: RADIOLOGY | Facility: HOSPITAL | Age: 61
Discharge: HOME | End: 2021-07-06
Attending: PODIATRIST | Admitting: PODIATRIST
Payer: COMMERCIAL

## 2021-07-06 ENCOUNTER — TELEPHONE (OUTPATIENT)
Dept: PODIATRY | Facility: CLINIC | Age: 61
End: 2021-07-06

## 2021-07-06 VITALS
DIASTOLIC BLOOD PRESSURE: 90 MMHG | HEART RATE: 48 BPM | RESPIRATION RATE: 16 BRPM | OXYGEN SATURATION: 98 % | SYSTOLIC BLOOD PRESSURE: 129 MMHG | BODY MASS INDEX: 30.7 KG/M2 | WEIGHT: 191 LBS | HEIGHT: 66 IN | TEMPERATURE: 97 F

## 2021-07-06 DIAGNOSIS — M20.5X1 ACQUIRED HALLUX LIMITUS OF RIGHT FOOT: Primary | ICD-10-CM

## 2021-07-06 PROCEDURE — 25000003 PHARM REV CODE 250: Mod: PO | Performed by: PODIATRIST

## 2021-07-06 PROCEDURE — 37000008 HC ANESTHESIA 1ST 15 MINUTES: Mod: PO | Performed by: PODIATRIST

## 2021-07-06 PROCEDURE — C1769 GUIDE WIRE: HCPCS | Mod: PO | Performed by: PODIATRIST

## 2021-07-06 PROCEDURE — 73630 X-RAY EXAM OF FOOT: CPT | Mod: TC,FY,PO,RT

## 2021-07-06 PROCEDURE — D9220A PRA ANESTHESIA: Mod: ANES,,, | Performed by: ANESTHESIOLOGY

## 2021-07-06 PROCEDURE — 37000009 HC ANESTHESIA EA ADD 15 MINS: Mod: PO | Performed by: PODIATRIST

## 2021-07-06 PROCEDURE — C1713 ANCHOR/SCREW BN/BN,TIS/BN: HCPCS | Mod: PO | Performed by: PODIATRIST

## 2021-07-06 PROCEDURE — 28750 FUSION OF BIG TOE JOINT: CPT | Mod: T5,,, | Performed by: PODIATRIST

## 2021-07-06 PROCEDURE — 36000708 HC OR TIME LEV III 1ST 15 MIN: Mod: PO | Performed by: PODIATRIST

## 2021-07-06 PROCEDURE — 63600175 PHARM REV CODE 636 W HCPCS: Mod: PO | Performed by: NURSE ANESTHETIST, CERTIFIED REGISTERED

## 2021-07-06 PROCEDURE — 71000033 HC RECOVERY, INTIAL HOUR: Mod: PO | Performed by: PODIATRIST

## 2021-07-06 PROCEDURE — 63600175 PHARM REV CODE 636 W HCPCS: Mod: PO | Performed by: ANESTHESIOLOGY

## 2021-07-06 PROCEDURE — 25000003 PHARM REV CODE 250: Mod: PO | Performed by: NURSE ANESTHETIST, CERTIFIED REGISTERED

## 2021-07-06 PROCEDURE — D9220A PRA ANESTHESIA: Mod: CRNA,,, | Performed by: NURSE ANESTHETIST, CERTIFIED REGISTERED

## 2021-07-06 PROCEDURE — 71000015 HC POSTOP RECOV 1ST HR: Mod: PO | Performed by: PODIATRIST

## 2021-07-06 PROCEDURE — 73630 X-RAY EXAM OF FOOT: CPT | Mod: 26,RT,, | Performed by: RADIOLOGY

## 2021-07-06 PROCEDURE — D9220A PRA ANESTHESIA: ICD-10-PCS | Mod: CRNA,,, | Performed by: NURSE ANESTHETIST, CERTIFIED REGISTERED

## 2021-07-06 PROCEDURE — 36000709 HC OR TIME LEV III EA ADD 15 MIN: Mod: PO | Performed by: PODIATRIST

## 2021-07-06 PROCEDURE — 73630 XR FOOT COMPLETE 3 VIEW RIGHT: ICD-10-PCS | Mod: 26,RT,, | Performed by: RADIOLOGY

## 2021-07-06 PROCEDURE — D9220A PRA ANESTHESIA: ICD-10-PCS | Mod: ANES,,, | Performed by: ANESTHESIOLOGY

## 2021-07-06 PROCEDURE — 27201423 OPTIME MED/SURG SUP & DEVICES STERILE SUPPLY: Mod: PO | Performed by: PODIATRIST

## 2021-07-06 PROCEDURE — 28750 PR FUSION BIG TOE,MT-P JT: ICD-10-PCS | Mod: T5,,, | Performed by: PODIATRIST

## 2021-07-06 DEVICE — SCREW LP VA 3X16MM LOK TI: Type: IMPLANTABLE DEVICE | Site: FOOT | Status: FUNCTIONAL

## 2021-07-06 DEVICE — GUIDEWIRE TROCAR TIP.062: Type: IMPLANTABLE DEVICE | Site: FOOT | Status: FUNCTIONAL

## 2021-07-06 DEVICE — SCREW BONE VAL 3X18MM TI: Type: IMPLANTABLE DEVICE | Site: FOOT | Status: FUNCTIONAL

## 2021-07-06 RX ORDER — BUPIVACAINE HYDROCHLORIDE 5 MG/ML
INJECTION, SOLUTION EPIDURAL; INTRACAUDAL
Status: DISCONTINUED | OUTPATIENT
Start: 2021-07-06 | End: 2021-07-06 | Stop reason: HOSPADM

## 2021-07-06 RX ORDER — LIDOCAINE HYDROCHLORIDE 10 MG/ML
INJECTION, SOLUTION EPIDURAL; INFILTRATION; INTRACAUDAL; PERINEURAL
Status: DISCONTINUED | OUTPATIENT
Start: 2021-07-06 | End: 2021-07-06 | Stop reason: HOSPADM

## 2021-07-06 RX ORDER — PROPOFOL 10 MG/ML
VIAL (ML) INTRAVENOUS
Status: DISCONTINUED | OUTPATIENT
Start: 2021-07-06 | End: 2021-07-06

## 2021-07-06 RX ORDER — ACETAMINOPHEN 10 MG/ML
INJECTION, SOLUTION INTRAVENOUS
Status: DISCONTINUED | OUTPATIENT
Start: 2021-07-06 | End: 2021-07-06

## 2021-07-06 RX ORDER — OXYCODONE AND ACETAMINOPHEN 5; 325 MG/1; MG/1
1 TABLET ORAL EVERY 4 HOURS PRN
Qty: 20 TABLET | Refills: 0 | Status: SHIPPED | OUTPATIENT
Start: 2021-07-06 | End: 2021-07-06

## 2021-07-06 RX ORDER — FENTANYL CITRATE 50 UG/ML
25 INJECTION, SOLUTION INTRAMUSCULAR; INTRAVENOUS EVERY 5 MIN PRN
Status: DISCONTINUED | OUTPATIENT
Start: 2021-07-06 | End: 2021-07-06 | Stop reason: HOSPADM

## 2021-07-06 RX ORDER — OXYCODONE AND ACETAMINOPHEN 5; 325 MG/1; MG/1
1 TABLET ORAL EVERY 4 HOURS PRN
Qty: 20 TABLET | Refills: 0 | Status: SHIPPED | OUTPATIENT
Start: 2021-07-06 | End: 2021-07-07 | Stop reason: ALTCHOICE

## 2021-07-06 RX ORDER — CEFAZOLIN SODIUM 2 G/50ML
2 SOLUTION INTRAVENOUS
Status: DISCONTINUED | OUTPATIENT
Start: 2021-07-06 | End: 2021-07-06 | Stop reason: HOSPADM

## 2021-07-06 RX ORDER — OXYCODONE HYDROCHLORIDE 5 MG/1
5 TABLET ORAL
Status: DISCONTINUED | OUTPATIENT
Start: 2021-07-06 | End: 2021-07-06 | Stop reason: HOSPADM

## 2021-07-06 RX ORDER — LIDOCAINE HYDROCHLORIDE 10 MG/ML
1 INJECTION, SOLUTION EPIDURAL; INFILTRATION; INTRACAUDAL; PERINEURAL ONCE
Status: DISCONTINUED | OUTPATIENT
Start: 2021-07-06 | End: 2021-07-06 | Stop reason: HOSPADM

## 2021-07-06 RX ORDER — ONDANSETRON 2 MG/ML
INJECTION INTRAMUSCULAR; INTRAVENOUS
Status: DISCONTINUED | OUTPATIENT
Start: 2021-07-06 | End: 2021-07-06

## 2021-07-06 RX ORDER — CEFAZOLIN SODIUM 1 G/3ML
INJECTION, POWDER, FOR SOLUTION INTRAMUSCULAR; INTRAVENOUS
Status: DISCONTINUED | OUTPATIENT
Start: 2021-07-06 | End: 2021-07-06

## 2021-07-06 RX ORDER — LIDOCAINE HYDROCHLORIDE 20 MG/ML
INJECTION INTRAVENOUS
Status: DISCONTINUED | OUTPATIENT
Start: 2021-07-06 | End: 2021-07-06

## 2021-07-06 RX ORDER — HYDROMORPHONE HYDROCHLORIDE 2 MG/ML
0.2 INJECTION, SOLUTION INTRAMUSCULAR; INTRAVENOUS; SUBCUTANEOUS EVERY 5 MIN PRN
Status: DISCONTINUED | OUTPATIENT
Start: 2021-07-06 | End: 2021-07-06 | Stop reason: HOSPADM

## 2021-07-06 RX ORDER — MIDAZOLAM HYDROCHLORIDE 1 MG/ML
INJECTION, SOLUTION INTRAMUSCULAR; INTRAVENOUS
Status: DISCONTINUED | OUTPATIENT
Start: 2021-07-06 | End: 2021-07-06

## 2021-07-06 RX ORDER — SODIUM CHLORIDE, SODIUM LACTATE, POTASSIUM CHLORIDE, CALCIUM CHLORIDE 600; 310; 30; 20 MG/100ML; MG/100ML; MG/100ML; MG/100ML
INJECTION, SOLUTION INTRAVENOUS CONTINUOUS
Status: DISCONTINUED | OUTPATIENT
Start: 2021-07-06 | End: 2021-07-06 | Stop reason: HOSPADM

## 2021-07-06 RX ORDER — PROPOFOL 10 MG/ML
VIAL (ML) INTRAVENOUS CONTINUOUS PRN
Status: DISCONTINUED | OUTPATIENT
Start: 2021-07-06 | End: 2021-07-06

## 2021-07-06 RX ORDER — EPHEDRINE SULFATE 50 MG/ML
INJECTION, SOLUTION INTRAVENOUS
Status: DISCONTINUED | OUTPATIENT
Start: 2021-07-06 | End: 2021-07-06

## 2021-07-06 RX ORDER — KETAMINE HCL IN 0.9 % NACL 50 MG/5 ML
SYRINGE (ML) INTRAVENOUS
Status: DISCONTINUED | OUTPATIENT
Start: 2021-07-06 | End: 2021-07-06

## 2021-07-06 RX ORDER — SODIUM CHLORIDE 0.9 % (FLUSH) 0.9 %
10 SYRINGE (ML) INJECTION
Status: DISCONTINUED | OUTPATIENT
Start: 2021-07-06 | End: 2021-07-06 | Stop reason: HOSPADM

## 2021-07-06 RX ADMIN — ACETAMINOPHEN 1000 MG: 10 INJECTION, SOLUTION INTRAVENOUS at 09:07

## 2021-07-06 RX ADMIN — SODIUM CHLORIDE, SODIUM LACTATE, POTASSIUM CHLORIDE, AND CALCIUM CHLORIDE: .6; .31; .03; .02 INJECTION, SOLUTION INTRAVENOUS at 07:07

## 2021-07-06 RX ADMIN — PROPOFOL 150 MCG/KG/MIN: 10 INJECTION, EMULSION INTRAVENOUS at 08:07

## 2021-07-06 RX ADMIN — MIDAZOLAM HYDROCHLORIDE 2 MG: 1 INJECTION, SOLUTION INTRAMUSCULAR; INTRAVENOUS at 08:07

## 2021-07-06 RX ADMIN — PROPOFOL 40 MG: 10 INJECTION, EMULSION INTRAVENOUS at 08:07

## 2021-07-06 RX ADMIN — Medication 20 MG: at 08:07

## 2021-07-06 RX ADMIN — ONDANSETRON 4 MG: 2 INJECTION INTRAMUSCULAR; INTRAVENOUS at 09:07

## 2021-07-06 RX ADMIN — EPHEDRINE SULFATE 10 MG: 50 INJECTION INTRAVENOUS at 09:07

## 2021-07-06 RX ADMIN — SODIUM CHLORIDE, SODIUM LACTATE, POTASSIUM CHLORIDE, AND CALCIUM CHLORIDE: .6; .31; .03; .02 INJECTION, SOLUTION INTRAVENOUS at 09:07

## 2021-07-06 RX ADMIN — LIDOCAINE HYDROCHLORIDE 100 MG: 20 INJECTION, SOLUTION INTRAVENOUS at 08:07

## 2021-07-06 RX ADMIN — CEFAZOLIN 2 G: 330 INJECTION, POWDER, FOR SOLUTION INTRAMUSCULAR; INTRAVENOUS at 08:07

## 2021-07-07 ENCOUNTER — OFFICE VISIT (OUTPATIENT)
Dept: PODIATRY | Facility: CLINIC | Age: 61
End: 2021-07-07
Payer: COMMERCIAL

## 2021-07-07 ENCOUNTER — TELEPHONE (OUTPATIENT)
Dept: PODIATRY | Facility: CLINIC | Age: 61
End: 2021-07-07

## 2021-07-07 VITALS
HEART RATE: 73 BPM | DIASTOLIC BLOOD PRESSURE: 104 MMHG | BODY MASS INDEX: 30.69 KG/M2 | WEIGHT: 190.94 LBS | SYSTOLIC BLOOD PRESSURE: 170 MMHG | HEIGHT: 66 IN

## 2021-07-07 DIAGNOSIS — Z98.890 POST-OPERATIVE STATE: Primary | ICD-10-CM

## 2021-07-07 PROCEDURE — 99999 PR PBB SHADOW E&M-EST. PATIENT-LVL III: CPT | Mod: PBBFAC,,, | Performed by: PODIATRIST

## 2021-07-07 PROCEDURE — 99024 POSTOP FOLLOW-UP VISIT: CPT | Mod: S$GLB,,, | Performed by: PODIATRIST

## 2021-07-07 PROCEDURE — 3008F PR BODY MASS INDEX (BMI) DOCUMENTED: ICD-10-PCS | Mod: CPTII,S$GLB,, | Performed by: PODIATRIST

## 2021-07-07 PROCEDURE — 99024 PR POST-OP FOLLOW-UP VISIT: ICD-10-PCS | Mod: S$GLB,,, | Performed by: PODIATRIST

## 2021-07-07 PROCEDURE — 1125F AMNT PAIN NOTED PAIN PRSNT: CPT | Mod: S$GLB,,, | Performed by: PODIATRIST

## 2021-07-07 PROCEDURE — 1125F PR PAIN SEVERITY QUANTIFIED, PAIN PRESENT: ICD-10-PCS | Mod: S$GLB,,, | Performed by: PODIATRIST

## 2021-07-07 PROCEDURE — 3008F BODY MASS INDEX DOCD: CPT | Mod: CPTII,S$GLB,, | Performed by: PODIATRIST

## 2021-07-07 PROCEDURE — 99999 PR PBB SHADOW E&M-EST. PATIENT-LVL III: ICD-10-PCS | Mod: PBBFAC,,, | Performed by: PODIATRIST

## 2021-07-09 ENCOUNTER — TELEPHONE (OUTPATIENT)
Dept: PODIATRY | Facility: CLINIC | Age: 61
End: 2021-07-09

## 2021-07-09 ENCOUNTER — TELEPHONE (OUTPATIENT)
Dept: GASTROENTEROLOGY | Facility: CLINIC | Age: 61
End: 2021-07-09

## 2021-07-09 ENCOUNTER — PATIENT MESSAGE (OUTPATIENT)
Dept: PODIATRY | Facility: CLINIC | Age: 61
End: 2021-07-09

## 2021-07-14 ENCOUNTER — OFFICE VISIT (OUTPATIENT)
Dept: PODIATRY | Facility: CLINIC | Age: 61
End: 2021-07-14
Payer: COMMERCIAL

## 2021-07-14 VITALS — BODY MASS INDEX: 30.69 KG/M2 | WEIGHT: 190.94 LBS | HEIGHT: 66 IN

## 2021-07-14 DIAGNOSIS — Z98.890 POST-OPERATIVE STATE: Primary | ICD-10-CM

## 2021-07-14 PROCEDURE — 1125F PR PAIN SEVERITY QUANTIFIED, PAIN PRESENT: ICD-10-PCS | Mod: S$GLB,,, | Performed by: PODIATRIST

## 2021-07-14 PROCEDURE — 99999 PR PBB SHADOW E&M-EST. PATIENT-LVL III: ICD-10-PCS | Mod: PBBFAC,,, | Performed by: PODIATRIST

## 2021-07-14 PROCEDURE — 3008F BODY MASS INDEX DOCD: CPT | Mod: CPTII,S$GLB,, | Performed by: PODIATRIST

## 2021-07-14 PROCEDURE — 3008F PR BODY MASS INDEX (BMI) DOCUMENTED: ICD-10-PCS | Mod: CPTII,S$GLB,, | Performed by: PODIATRIST

## 2021-07-14 PROCEDURE — 99999 PR PBB SHADOW E&M-EST. PATIENT-LVL III: CPT | Mod: PBBFAC,,, | Performed by: PODIATRIST

## 2021-07-14 PROCEDURE — 99024 PR POST-OP FOLLOW-UP VISIT: ICD-10-PCS | Mod: S$GLB,,, | Performed by: PODIATRIST

## 2021-07-14 PROCEDURE — 1125F AMNT PAIN NOTED PAIN PRSNT: CPT | Mod: S$GLB,,, | Performed by: PODIATRIST

## 2021-07-14 PROCEDURE — 99024 POSTOP FOLLOW-UP VISIT: CPT | Mod: S$GLB,,, | Performed by: PODIATRIST

## 2021-07-14 RX ORDER — DOXYCYCLINE HYCLATE 100 MG
100 TABLET ORAL 2 TIMES DAILY
Qty: 14 TABLET | Refills: 0 | Status: SHIPPED | OUTPATIENT
Start: 2021-07-14 | End: 2021-07-21

## 2021-07-21 ENCOUNTER — OFFICE VISIT (OUTPATIENT)
Dept: PODIATRY | Facility: CLINIC | Age: 61
End: 2021-07-21
Payer: COMMERCIAL

## 2021-07-21 VITALS
BODY MASS INDEX: 30.69 KG/M2 | HEART RATE: 64 BPM | WEIGHT: 190.94 LBS | HEIGHT: 66 IN | DIASTOLIC BLOOD PRESSURE: 77 MMHG | SYSTOLIC BLOOD PRESSURE: 127 MMHG

## 2021-07-21 DIAGNOSIS — Z98.890 POST-OPERATIVE STATE: Primary | ICD-10-CM

## 2021-07-21 PROCEDURE — 99024 POSTOP FOLLOW-UP VISIT: CPT | Mod: S$GLB,,, | Performed by: PODIATRIST

## 2021-07-21 PROCEDURE — 99999 PR PBB SHADOW E&M-EST. PATIENT-LVL III: CPT | Mod: PBBFAC,,, | Performed by: PODIATRIST

## 2021-07-21 PROCEDURE — 1126F PR PAIN SEVERITY QUANTIFIED, NO PAIN PRESENT: ICD-10-PCS | Mod: CPTII,S$GLB,, | Performed by: PODIATRIST

## 2021-07-21 PROCEDURE — 99999 PR PBB SHADOW E&M-EST. PATIENT-LVL III: ICD-10-PCS | Mod: PBBFAC,,, | Performed by: PODIATRIST

## 2021-07-21 PROCEDURE — 3008F PR BODY MASS INDEX (BMI) DOCUMENTED: ICD-10-PCS | Mod: CPTII,S$GLB,, | Performed by: PODIATRIST

## 2021-07-21 PROCEDURE — 1126F AMNT PAIN NOTED NONE PRSNT: CPT | Mod: CPTII,S$GLB,, | Performed by: PODIATRIST

## 2021-07-21 PROCEDURE — 3008F BODY MASS INDEX DOCD: CPT | Mod: CPTII,S$GLB,, | Performed by: PODIATRIST

## 2021-07-21 PROCEDURE — 99024 PR POST-OP FOLLOW-UP VISIT: ICD-10-PCS | Mod: S$GLB,,, | Performed by: PODIATRIST

## 2021-08-03 ENCOUNTER — PATIENT OUTREACH (OUTPATIENT)
Dept: ADMINISTRATIVE | Facility: OTHER | Age: 61
End: 2021-08-03

## 2021-08-05 ENCOUNTER — OFFICE VISIT (OUTPATIENT)
Dept: GASTROENTEROLOGY | Facility: CLINIC | Age: 61
End: 2021-08-05
Payer: COMMERCIAL

## 2021-08-05 ENCOUNTER — LAB VISIT (OUTPATIENT)
Dept: LAB | Facility: HOSPITAL | Age: 61
End: 2021-08-05
Attending: NURSE PRACTITIONER
Payer: COMMERCIAL

## 2021-08-05 VITALS — WEIGHT: 190 LBS | HEIGHT: 66 IN | BODY MASS INDEX: 30.53 KG/M2

## 2021-08-05 DIAGNOSIS — K42.9 UMBILICAL HERNIA WITHOUT OBSTRUCTION AND WITHOUT GANGRENE: ICD-10-CM

## 2021-08-05 DIAGNOSIS — Z87.39 HISTORY OF DEGENERATIVE DISC DISEASE: ICD-10-CM

## 2021-08-05 DIAGNOSIS — R14.0 BLOATING SYMPTOM: Primary | ICD-10-CM

## 2021-08-05 DIAGNOSIS — R15.2 FECAL URGENCY: ICD-10-CM

## 2021-08-05 DIAGNOSIS — Z87.19 HISTORY OF DIVERTICULOSIS: ICD-10-CM

## 2021-08-05 DIAGNOSIS — N40.0 ENLARGED PROSTATE: ICD-10-CM

## 2021-08-05 DIAGNOSIS — R93.429 ABNORMAL CT SCAN, KIDNEY: ICD-10-CM

## 2021-08-05 DIAGNOSIS — R14.0 BLOATING SYMPTOM: ICD-10-CM

## 2021-08-05 PROCEDURE — 3008F PR BODY MASS INDEX (BMI) DOCUMENTED: ICD-10-PCS | Mod: CPTII,S$GLB,, | Performed by: NURSE PRACTITIONER

## 2021-08-05 PROCEDURE — 1159F MED LIST DOCD IN RCRD: CPT | Mod: CPTII,S$GLB,, | Performed by: NURSE PRACTITIONER

## 2021-08-05 PROCEDURE — 1160F RVW MEDS BY RX/DR IN RCRD: CPT | Mod: CPTII,S$GLB,, | Performed by: NURSE PRACTITIONER

## 2021-08-05 PROCEDURE — 1160F PR REVIEW ALL MEDS BY PRESCRIBER/CLIN PHARMACIST DOCUMENTED: ICD-10-PCS | Mod: CPTII,S$GLB,, | Performed by: NURSE PRACTITIONER

## 2021-08-05 PROCEDURE — 82784 ASSAY IGA/IGD/IGG/IGM EACH: CPT | Performed by: NURSE PRACTITIONER

## 2021-08-05 PROCEDURE — 99999 PR PBB SHADOW E&M-EST. PATIENT-LVL III: CPT | Mod: PBBFAC,,, | Performed by: NURSE PRACTITIONER

## 2021-08-05 PROCEDURE — 1159F PR MEDICATION LIST DOCUMENTED IN MEDICAL RECORD: ICD-10-PCS | Mod: CPTII,S$GLB,, | Performed by: NURSE PRACTITIONER

## 2021-08-05 PROCEDURE — 36415 COLL VENOUS BLD VENIPUNCTURE: CPT | Mod: PO | Performed by: NURSE PRACTITIONER

## 2021-08-05 PROCEDURE — 3008F BODY MASS INDEX DOCD: CPT | Mod: CPTII,S$GLB,, | Performed by: NURSE PRACTITIONER

## 2021-08-05 PROCEDURE — 99999 PR PBB SHADOW E&M-EST. PATIENT-LVL III: ICD-10-PCS | Mod: PBBFAC,,, | Performed by: NURSE PRACTITIONER

## 2021-08-05 PROCEDURE — 86677 HELICOBACTER PYLORI ANTIBODY: CPT | Performed by: NURSE PRACTITIONER

## 2021-08-05 PROCEDURE — 1126F AMNT PAIN NOTED NONE PRSNT: CPT | Mod: CPTII,S$GLB,, | Performed by: NURSE PRACTITIONER

## 2021-08-05 PROCEDURE — 99214 PR OFFICE/OUTPT VISIT, EST, LEVL IV, 30-39 MIN: ICD-10-PCS | Mod: S$GLB,,, | Performed by: NURSE PRACTITIONER

## 2021-08-05 PROCEDURE — 1126F PR PAIN SEVERITY QUANTIFIED, NO PAIN PRESENT: ICD-10-PCS | Mod: CPTII,S$GLB,, | Performed by: NURSE PRACTITIONER

## 2021-08-05 PROCEDURE — 99214 OFFICE O/P EST MOD 30 MIN: CPT | Mod: S$GLB,,, | Performed by: NURSE PRACTITIONER

## 2021-08-05 PROCEDURE — 83516 IMMUNOASSAY NONANTIBODY: CPT | Performed by: NURSE PRACTITIONER

## 2021-08-06 LAB
H PYLORI IGG SERPL QL IA: NEGATIVE
IGA SERPL-MCNC: 237 MG/DL (ref 40–350)

## 2021-08-10 ENCOUNTER — TELEPHONE (OUTPATIENT)
Dept: GASTROENTEROLOGY | Facility: CLINIC | Age: 61
End: 2021-08-10

## 2021-08-10 LAB — TTG IGA SER-ACNC: 7 UNITS

## 2021-08-18 ENCOUNTER — HOSPITAL ENCOUNTER (OUTPATIENT)
Dept: RADIOLOGY | Facility: HOSPITAL | Age: 61
Discharge: HOME | End: 2021-08-18
Attending: PODIATRIST
Payer: COMMERCIAL

## 2021-08-18 ENCOUNTER — OFFICE VISIT (OUTPATIENT)
Dept: PODIATRY | Facility: CLINIC | Age: 61
End: 2021-08-18
Payer: COMMERCIAL

## 2021-08-18 VITALS — HEART RATE: 64 BPM | DIASTOLIC BLOOD PRESSURE: 75 MMHG | SYSTOLIC BLOOD PRESSURE: 140 MMHG

## 2021-08-18 DIAGNOSIS — Z98.890 POST-OPERATIVE STATE: Primary | ICD-10-CM

## 2021-08-18 DIAGNOSIS — Z98.890 POST-OPERATIVE STATE: ICD-10-CM

## 2021-08-18 PROCEDURE — 99999 PR PBB SHADOW E&M-EST. PATIENT-LVL III: ICD-10-PCS | Mod: PBBFAC,,, | Performed by: PODIATRIST

## 2021-08-18 PROCEDURE — 1126F PR PAIN SEVERITY QUANTIFIED, NO PAIN PRESENT: ICD-10-PCS | Mod: CPTII,S$GLB,, | Performed by: PODIATRIST

## 2021-08-18 PROCEDURE — 99999 PR PBB SHADOW E&M-EST. PATIENT-LVL III: CPT | Mod: PBBFAC,,, | Performed by: PODIATRIST

## 2021-08-18 PROCEDURE — 1159F PR MEDICATION LIST DOCUMENTED IN MEDICAL RECORD: ICD-10-PCS | Mod: CPTII,S$GLB,, | Performed by: PODIATRIST

## 2021-08-18 PROCEDURE — 1160F RVW MEDS BY RX/DR IN RCRD: CPT | Mod: CPTII,S$GLB,, | Performed by: PODIATRIST

## 2021-08-18 PROCEDURE — 1126F AMNT PAIN NOTED NONE PRSNT: CPT | Mod: CPTII,S$GLB,, | Performed by: PODIATRIST

## 2021-08-18 PROCEDURE — 99024 POSTOP FOLLOW-UP VISIT: CPT | Mod: S$GLB,,, | Performed by: PODIATRIST

## 2021-08-18 PROCEDURE — 3077F PR MOST RECENT SYSTOLIC BLOOD PRESSURE >= 140 MM HG: ICD-10-PCS | Mod: CPTII,S$GLB,, | Performed by: PODIATRIST

## 2021-08-18 PROCEDURE — 3077F SYST BP >= 140 MM HG: CPT | Mod: CPTII,S$GLB,, | Performed by: PODIATRIST

## 2021-08-18 PROCEDURE — 73630 X-RAY EXAM OF FOOT: CPT | Mod: 26,RT,, | Performed by: RADIOLOGY

## 2021-08-18 PROCEDURE — 3078F DIAST BP <80 MM HG: CPT | Mod: CPTII,S$GLB,, | Performed by: PODIATRIST

## 2021-08-18 PROCEDURE — 1159F MED LIST DOCD IN RCRD: CPT | Mod: CPTII,S$GLB,, | Performed by: PODIATRIST

## 2021-08-18 PROCEDURE — 3078F PR MOST RECENT DIASTOLIC BLOOD PRESSURE < 80 MM HG: ICD-10-PCS | Mod: CPTII,S$GLB,, | Performed by: PODIATRIST

## 2021-08-18 PROCEDURE — 73630 XR FOOT COMPLETE 3 VIEW RIGHT: ICD-10-PCS | Mod: 26,RT,, | Performed by: RADIOLOGY

## 2021-08-18 PROCEDURE — 99024 PR POST-OP FOLLOW-UP VISIT: ICD-10-PCS | Mod: S$GLB,,, | Performed by: PODIATRIST

## 2021-08-18 PROCEDURE — 1160F PR REVIEW ALL MEDS BY PRESCRIBER/CLIN PHARMACIST DOCUMENTED: ICD-10-PCS | Mod: CPTII,S$GLB,, | Performed by: PODIATRIST

## 2021-08-18 PROCEDURE — 73630 X-RAY EXAM OF FOOT: CPT | Mod: TC,FY,PO,RT

## 2022-01-21 ENCOUNTER — OFFICE VISIT (OUTPATIENT)
Dept: PODIATRY | Facility: CLINIC | Age: 62
End: 2022-01-21
Payer: COMMERCIAL

## 2022-01-21 ENCOUNTER — PATIENT OUTREACH (OUTPATIENT)
Dept: ADMINISTRATIVE | Facility: OTHER | Age: 62
End: 2022-01-21
Payer: COMMERCIAL

## 2022-01-21 ENCOUNTER — HOSPITAL ENCOUNTER (OUTPATIENT)
Dept: RADIOLOGY | Facility: HOSPITAL | Age: 62
Discharge: HOME | End: 2022-01-21
Attending: PODIATRIST
Payer: COMMERCIAL

## 2022-01-21 VITALS — RESPIRATION RATE: 20 BRPM | BODY MASS INDEX: 30.67 KG/M2 | HEIGHT: 66 IN

## 2022-01-21 DIAGNOSIS — M25.871 SESAMOIDITIS OF RIGHT FOOT: ICD-10-CM

## 2022-01-21 DIAGNOSIS — M79.671 RIGHT FOOT PAIN: ICD-10-CM

## 2022-01-21 DIAGNOSIS — M79.671 RIGHT FOOT PAIN: Primary | ICD-10-CM

## 2022-01-21 PROCEDURE — 3008F BODY MASS INDEX DOCD: CPT | Mod: CPTII,S$GLB,, | Performed by: PODIATRIST

## 2022-01-21 PROCEDURE — 99213 OFFICE O/P EST LOW 20 MIN: CPT | Mod: 25,S$GLB,, | Performed by: PODIATRIST

## 2022-01-21 PROCEDURE — 73630 X-RAY EXAM OF FOOT: CPT | Mod: 26,RT,, | Performed by: RADIOLOGY

## 2022-01-21 PROCEDURE — 99999 PR PBB SHADOW E&M-EST. PATIENT-LVL III: CPT | Mod: PBBFAC,,, | Performed by: PODIATRIST

## 2022-01-21 PROCEDURE — 20600 PR DRAIN/INJECT SMALL JOINT/BURSA: ICD-10-PCS | Mod: RT,S$GLB,, | Performed by: PODIATRIST

## 2022-01-21 PROCEDURE — 99999 PR PBB SHADOW E&M-EST. PATIENT-LVL III: ICD-10-PCS | Mod: PBBFAC,,, | Performed by: PODIATRIST

## 2022-01-21 PROCEDURE — 1160F PR REVIEW ALL MEDS BY PRESCRIBER/CLIN PHARMACIST DOCUMENTED: ICD-10-PCS | Mod: CPTII,S$GLB,, | Performed by: PODIATRIST

## 2022-01-21 PROCEDURE — 73630 XR FOOT COMPLETE 3 VIEW RIGHT: ICD-10-PCS | Mod: 26,RT,, | Performed by: RADIOLOGY

## 2022-01-21 PROCEDURE — 20600 DRAIN/INJ JOINT/BURSA W/O US: CPT | Mod: RT,S$GLB,, | Performed by: PODIATRIST

## 2022-01-21 PROCEDURE — 1160F RVW MEDS BY RX/DR IN RCRD: CPT | Mod: CPTII,S$GLB,, | Performed by: PODIATRIST

## 2022-01-21 PROCEDURE — 1159F MED LIST DOCD IN RCRD: CPT | Mod: CPTII,S$GLB,, | Performed by: PODIATRIST

## 2022-01-21 PROCEDURE — 3008F PR BODY MASS INDEX (BMI) DOCUMENTED: ICD-10-PCS | Mod: CPTII,S$GLB,, | Performed by: PODIATRIST

## 2022-01-21 PROCEDURE — 99213 PR OFFICE/OUTPT VISIT, EST, LEVL III, 20-29 MIN: ICD-10-PCS | Mod: 25,S$GLB,, | Performed by: PODIATRIST

## 2022-01-21 PROCEDURE — 1159F PR MEDICATION LIST DOCUMENTED IN MEDICAL RECORD: ICD-10-PCS | Mod: CPTII,S$GLB,, | Performed by: PODIATRIST

## 2022-01-21 PROCEDURE — 73630 X-RAY EXAM OF FOOT: CPT | Mod: TC,FY,PO,RT

## 2022-01-21 RX ORDER — METHYLPREDNISOLONE ACETATE 40 MG/ML
40 INJECTION, SUSPENSION INTRA-ARTICULAR; INTRALESIONAL; INTRAMUSCULAR; SOFT TISSUE
Status: COMPLETED | OUTPATIENT
Start: 2022-01-21 | End: 2022-01-21

## 2022-01-21 RX ADMIN — METHYLPREDNISOLONE ACETATE 40 MG: 40 INJECTION, SUSPENSION INTRA-ARTICULAR; INTRALESIONAL; INTRAMUSCULAR; SOFT TISSUE at 03:01

## 2022-01-21 NOTE — PROGRESS NOTES
Health Maintenance Due   Topic Date Due    Shingles Vaccine (1 of 2) Never done    COVID-19 Vaccine (3 - Booster for Moderna series) 10/13/2021     Updates were requested from care everywhere.  Chart was reviewed for overdue Proactive [facility] Encounters (JESSICA) topics (CRS, Breast Cancer Screening, Eye exam)  Health Maintenance has been updated.  LINKS immunization registry triggered.  Immunizations were reconciled.  .

## 2022-01-21 NOTE — PROGRESS NOTES
Subjective:      Patient ID: Efra Corey is a 61 y.o. male.    Chief Complaint: Foot Pain (Right foot Sx done --- July 6th 2021)    Efra is a 61 y.o. male who presents to the podiatry clinic  with complaint of  right foot pain around the first intermetatarsal space into the big toe and second toe at times. Onset of the symptoms was several months ago. Precipitating event: none known. Current symptoms include: ability to bear weight, but with some pain and worsening symptoms after a period of activity feels like he is walking on a golf ball and there is burning and aching that lasts for even 45 minutes or more after he gets off his feet. Aggravating factors: any weight bearing. Symptoms have gradually worsened. Patient has had prior foot problems. Evaluation to date: plain films: severe arthritic changes to the first MTPJ. Treatment to date: stiff shoes. Patients rates pain 10/10 on pain scale when at its worst.    3/24/21: Patient returns for follow up right great toe hallux limitus, he relates that the injection helped some and he is wearing Hokas, also working to get a custom orthotic with steel shank. Relates it is better but considering surgery to fix the issue and seeking more information about this    5/12/21: patient returns for follow up right great toe pain with hallux limitus, ready to discuss and plan surgical intervention as conservative care has not helped and the pain persists.    7/7/21: patient returns for follow up right 1st MTPJ arthrodesis done yesterday there was some strike through on the dressing and he relates the oxycodone 5/325 is not helping with the pain much    7/14/21: Patient returns s/p 1 week right first MTPJ arthrodesis, no new complaints doing well overall, pain much improved    7/21/21: patient returns s/p 2 weeks right first MTPJ arthrodesis. Doing well no new complaints.     8/18/21: patient returns s/p 6 weeks right first MTPJ arthrodesis no new complaints doing well  walking in the boot.    1/21/22: Patient returns with continued right foot pain, there has been pain in the foot since surgery last July. Some shooting pain into the toe and pain under the metatarsal head when at toe push off during gait. Walking more on the lateral foot to take pressure off the first MTPJ area.       Review of Systems   Constitutional: Negative for chills and fever.   Cardiovascular: Negative for claudication and leg swelling.   Respiratory: Negative for shortness of breath.    Skin: Negative for itching, nail changes and rash.   Musculoskeletal: Positive for arthritis. Negative for muscle cramps, muscle weakness and myalgias.        Right foot pain   Gastrointestinal: Negative for nausea and vomiting.   Neurological: Negative for focal weakness, loss of balance, numbness and paresthesias.           Objective:      Physical Exam  Constitutional:       General: He is not in acute distress.     Appearance: He is well-developed. He is not diaphoretic.   Cardiovascular:      Pulses:           Dorsalis pedis pulses are 2+ on the right side and 2+ on the left side.        Posterior tibial pulses are 2+ on the right side and 2+ on the left side.      Comments: < 3 sec capillary refill time to toes 1-5 bilateral. Toes and feet are warm to touch proximally with normal distal cooling b/l. There is some hair growth on the feet and toes b/l. There is no edema b/l. No spider veins or varicosities present b/l.     Musculoskeletal:      Comments: Equinus noted b/l ankles with < 10 deg DF noted. MMT 5/5 in DF/PF/Inv/Ev resistance with no reproduction of pain in any direction. Passive range of motion of ankle and pedal joints is painless b/l.    Right hallux in rectus position no motion at the MTPJ    Pain seems to be at the lateral sesamoid with palpation. Some paresthesias with palpation dorsal foot around the surgical site.     Skin:     General: Skin is warm and dry.      Coloration: Skin is not pale.       Findings: No abrasion, bruising, burn, ecchymosis, erythema, laceration, lesion, petechiae or rash.      Nails: There is no clubbing.      Comments: Skin temperature, texture and turgor within normal limits.    Incision right foot well healed with sutures removed today   Neurological:      Mental Status: He is alert and oriented to person, place, and time.      Sensory: No sensory deficit.      Motor: No tremor, atrophy or abnormal muscle tone.      Comments: Negative tinel sign bilateral.   Psychiatric:         Behavior: Behavior normal.               Assessment:       Encounter Diagnosis   Name Primary?    Right foot pain Yes         Plan:       Efra was seen today for foot pain.    Diagnoses and all orders for this visit:    Right foot pain  -     X-Ray Foot Complete Right; Future      I counseled the patient on his conditions, their implications and medical management.    Will get a follow up x-ray to make sure the pain is not secondary to a non union, I do not believe that it is. The pain seems to be around the sesamoid and possible nerve pain around the scar, Discussed doing an injection today and wearing supportive shoes    If pain persists consider sesamoidectomy and hardware removal    A steroid injection was performed at first intermetatarsal space directed to the plantar lateral sesamoid and first MTPJ using 1% plain Lidocaine and 40 mg of depo medrol. This was well tolerated.    Return 6 weeks for follow up    Lb Schrader DPM

## 2022-01-21 NOTE — TELEPHONE ENCOUNTER
----- Message from Irish Roper sent at 10/5/2018 11:09 AM CDT -----  Contact: self  Needs to schedule colonoscopy. Please call back at [phone] (shwv)     
Pt has been scheduled for colon on 11/5. Reviewed mg citrate prep. Pt is aware I will be mailing instructions and confirmation letter.   
Negative

## 2022-02-28 ENCOUNTER — OFFICE VISIT (OUTPATIENT)
Dept: FAMILY MEDICINE | Facility: CLINIC | Age: 62
End: 2022-02-28
Payer: COMMERCIAL

## 2022-02-28 VITALS
TEMPERATURE: 98 F | WEIGHT: 183.44 LBS | HEIGHT: 66 IN | SYSTOLIC BLOOD PRESSURE: 132 MMHG | DIASTOLIC BLOOD PRESSURE: 88 MMHG | BODY MASS INDEX: 29.48 KG/M2 | HEART RATE: 66 BPM | OXYGEN SATURATION: 98 %

## 2022-02-28 DIAGNOSIS — M79.641 BILATERAL HAND PAIN: Primary | ICD-10-CM

## 2022-02-28 DIAGNOSIS — M79.642 BILATERAL HAND PAIN: Primary | ICD-10-CM

## 2022-02-28 PROCEDURE — 3008F PR BODY MASS INDEX (BMI) DOCUMENTED: ICD-10-PCS | Mod: CPTII,S$GLB,, | Performed by: NURSE PRACTITIONER

## 2022-02-28 PROCEDURE — 3008F BODY MASS INDEX DOCD: CPT | Mod: CPTII,S$GLB,, | Performed by: NURSE PRACTITIONER

## 2022-02-28 PROCEDURE — 99213 OFFICE O/P EST LOW 20 MIN: CPT | Mod: S$GLB,,, | Performed by: NURSE PRACTITIONER

## 2022-02-28 PROCEDURE — 1160F RVW MEDS BY RX/DR IN RCRD: CPT | Mod: CPTII,S$GLB,, | Performed by: NURSE PRACTITIONER

## 2022-02-28 PROCEDURE — 1159F MED LIST DOCD IN RCRD: CPT | Mod: CPTII,S$GLB,, | Performed by: NURSE PRACTITIONER

## 2022-02-28 PROCEDURE — 3075F SYST BP GE 130 - 139MM HG: CPT | Mod: CPTII,S$GLB,, | Performed by: NURSE PRACTITIONER

## 2022-02-28 PROCEDURE — 99999 PR PBB SHADOW E&M-EST. PATIENT-LVL V: ICD-10-PCS | Mod: PBBFAC,,, | Performed by: NURSE PRACTITIONER

## 2022-02-28 PROCEDURE — 1159F PR MEDICATION LIST DOCUMENTED IN MEDICAL RECORD: ICD-10-PCS | Mod: CPTII,S$GLB,, | Performed by: NURSE PRACTITIONER

## 2022-02-28 PROCEDURE — 3079F PR MOST RECENT DIASTOLIC BLOOD PRESSURE 80-89 MM HG: ICD-10-PCS | Mod: CPTII,S$GLB,, | Performed by: NURSE PRACTITIONER

## 2022-02-28 PROCEDURE — 1160F PR REVIEW ALL MEDS BY PRESCRIBER/CLIN PHARMACIST DOCUMENTED: ICD-10-PCS | Mod: CPTII,S$GLB,, | Performed by: NURSE PRACTITIONER

## 2022-02-28 PROCEDURE — 3079F DIAST BP 80-89 MM HG: CPT | Mod: CPTII,S$GLB,, | Performed by: NURSE PRACTITIONER

## 2022-02-28 PROCEDURE — 99999 PR PBB SHADOW E&M-EST. PATIENT-LVL V: CPT | Mod: PBBFAC,,, | Performed by: NURSE PRACTITIONER

## 2022-02-28 PROCEDURE — 3075F PR MOST RECENT SYSTOLIC BLOOD PRESS GE 130-139MM HG: ICD-10-PCS | Mod: CPTII,S$GLB,, | Performed by: NURSE PRACTITIONER

## 2022-02-28 PROCEDURE — 99213 PR OFFICE/OUTPT VISIT, EST, LEVL III, 20-29 MIN: ICD-10-PCS | Mod: S$GLB,,, | Performed by: NURSE PRACTITIONER

## 2022-02-28 RX ORDER — METHYLPREDNISOLONE 4 MG/1
TABLET ORAL
Qty: 1 EACH | Refills: 0 | Status: SHIPPED | OUTPATIENT
Start: 2022-02-28 | End: 2022-03-21

## 2022-02-28 NOTE — PROGRESS NOTES
Subjective:       Patient ID: Efra Corey is a 62 y.o. male.    Chief Complaint: Arthritis (BILATERAL HANDS)    Patient has had some ongoing arthritis issues in his left hand, 10 days ago he began to notice symptoms in his right hand. Nothing out of the ordinary activity wise, no injury.    Numbing pain and shoots when pinching with thumb and index finger, shoots down thumb and into wrist with flexion of the thumb. No pain or symptoms in the last 3 fingers. On the left hand has similar symptoms, but have been going on longer.     He did have an EMG 2020, due to cervical radiculopathy with pain and numbness in the right arm, was on gabapentin for neuropathy, off x 5-6 months, restarted it last week. ENG showed:  Impression:  This is an abnormal EMG of both upper extremities.  There is electrophysiologic evidence of the followin. Chronic, mild, right median mononeuropathy across the wrist (carpal tunnel syndrome) without active denervation.  2. Chronic, mild, left median mononeuropathy across the wrist (carpal tunnel syndrome) without active denervation.  3. There is evidence that is suggestive, but not diagnostic of a very mild, bilateral ulnar neuropathies across the elbows without active denervation.  Clinical correlation is recommended.  4. Chronic, mild, left C6 radiculopathy without active denervation.  5. Chronic, mild, right C6 radiculopathy without active denervation.  There is no evidence of any other focal neuropathy, peripheral neuropathy, plexopathy or radiculopathy on the study.    Past Medical History:  No date: Colon polyp  No date: Diverticulosis  No date: DJD (degenerative joint disease) of knee    Past Surgical History:  No date: COLONOSCOPY  2018: COLONOSCOPY; N/A      Comment:  Procedure: COLONOSCOPY;  Surgeon: Efra Paul MD;  Location: [facility];  Service: Endoscopy;                 Laterality: N/A; 1 colon polyp removed, repeat in 5 years               for  surveillance; biopsy: Sessile serrated polyp  2021: FUSION OF METATARSOPHALANGEAL JOINT; Right      Comment:  Procedure: FUSION, MTP JOINT;  Surgeon: Lb Schrader DPM;  Location: [facility] OR;  Service: Podiatry;                 Laterality: Right;  2006: KNEE ARTHROSCOPY W/ ACL RECONSTRUCTION      Comment:  left; Dr. Rock Alanis  No date: MOUTH SURGERY  No date: SKIN CANCER EXCISION    Review of patient's family history indicates:  Problem: Colon cancer      Relation: Neg Hx          Age of Onset: (Not Specified)  Problem: Crohn's disease      Relation: Neg Hx          Age of Onset: (Not Specified)  Problem: Ulcerative colitis      Relation: Neg Hx          Age of Onset: (Not Specified)  Problem: Celiac disease      Relation: Neg Hx          Age of Onset: (Not Specified)      Social History    Socioeconomic History      Marital status:       Number of children: 2    Occupational History      Occupation: [employer]    Tobacco Use      Smoking status: Never Smoker      Smokeless tobacco: Never Used    Substance and Sexual Activity      Alcohol use: Yes        Alcohol/week: 1.0 standard drink        Types: 1 Cans of beer per week        Comment: social      Drug use: No    Social History Narrative      : wife  of breast cancer            Exercise: regular            From [place], [place]      Current Outpatient Medications:  dicyclomine (BENTYL) 20 mg tablet, Take 1 tablet (20 mg total) by mouth 4 (four) times daily with meals and nightly. (Patient taking differently: Take 20 mg by mouth 4 (four) times daily as needed.), Disp: 240 tablet, Rfl: 5  gabapentin (NEURONTIN) 300 MG capsule, Take 1 capsule (300 mg total) by mouth every evening., Disp: 30 capsule, Rfl: 11  oxyCODONE-acetaminophen (PERCOCET) 5-325 mg per tablet, Take 1 tablet by mouth every 6 (six) hours as needed for Pain., Disp: 15 tablet, Rfl: 0  sildenafil (REVATIO) 20 mg Tab, Take 1 tablet (20 mg total) by mouth  3 (three) times daily., Disp: 90 tablet, Rfl: 3  acyclovir (ZOVIRAX) 400 MG tablet, TAKE 1 TABLET BY MOUTH TWICE A DAY (Patient not taking: Reported on 2/28/2022), Disp: 180 tablet, Rfl: 2    No current facility-administered medications for this visit.      Review of patient's allergies indicates:  No Known Allergies    Review of Systems   Constitutional: Negative.  Negative for diaphoresis, fatigue and fever.   HENT: Negative.    Respiratory: Negative.    Cardiovascular: Negative.    Gastrointestinal: Negative.    Genitourinary: Negative.    Musculoskeletal: Positive for myalgias.   Integumentary:  Negative for pallor, rash and wound.   Neurological: Positive for numbness. Negative for dizziness and light-headedness.         Objective:      Physical Exam  Constitutional:       General: He is not in acute distress.     Appearance: Normal appearance.   HENT:      Head: Normocephalic and atraumatic.   Cardiovascular:      Rate and Rhythm: Normal rate.   Pulmonary:      Effort: Pulmonary effort is normal. No respiratory distress.   Musculoskeletal:      Right hand: Tenderness present. No deformity, lacerations or bony tenderness. Normal range of motion. Decreased strength of thumb/finger opposition. Normal sensation. There is no disruption of two-point discrimination. Normal capillary refill. Normal pulse.   Neurological:      General: No focal deficit present.      Mental Status: He is alert and oriented to person, place, and time.   Psychiatric:         Mood and Affect: Mood normal.         Behavior: Behavior normal.         Assessment:       Problem List Items Addressed This Visit    None     Visit Diagnoses     Bilateral hand pain    -  Primary    Relevant Medications    methylPREDNISolone (MEDROL DOSEPACK) 4 mg tablet    Other Relevant Orders    Ambulatory referral/consult to Orthopedics          Plan:       1. Bilateral hand pain  Medrol dose pack, scheduled to see Dr Barnes, hand specialty, first available  for evaluation.  - Ambulatory referral/consult to Orthopedics; Future  - methylPREDNISolone (MEDROL DOSEPACK) 4 mg tablet; use as directed  Dispense: 1 each; Refill: 0

## 2022-03-07 ENCOUNTER — OFFICE VISIT (OUTPATIENT)
Dept: PODIATRY | Facility: CLINIC | Age: 62
End: 2022-03-07
Payer: COMMERCIAL

## 2022-03-07 DIAGNOSIS — M25.871 SESAMOIDITIS OF RIGHT FOOT: Primary | ICD-10-CM

## 2022-03-07 PROCEDURE — 99212 OFFICE O/P EST SF 10 MIN: CPT | Mod: S$GLB,,, | Performed by: PODIATRIST

## 2022-03-07 PROCEDURE — 1160F RVW MEDS BY RX/DR IN RCRD: CPT | Mod: CPTII,S$GLB,, | Performed by: PODIATRIST

## 2022-03-07 PROCEDURE — 99212 PR OFFICE/OUTPT VISIT, EST, LEVL II, 10-19 MIN: ICD-10-PCS | Mod: S$GLB,,, | Performed by: PODIATRIST

## 2022-03-07 PROCEDURE — 99999 PR PBB SHADOW E&M-EST. PATIENT-LVL II: ICD-10-PCS | Mod: PBBFAC,,, | Performed by: PODIATRIST

## 2022-03-07 PROCEDURE — 99999 PR PBB SHADOW E&M-EST. PATIENT-LVL II: CPT | Mod: PBBFAC,,, | Performed by: PODIATRIST

## 2022-03-07 PROCEDURE — 1160F PR REVIEW ALL MEDS BY PRESCRIBER/CLIN PHARMACIST DOCUMENTED: ICD-10-PCS | Mod: CPTII,S$GLB,, | Performed by: PODIATRIST

## 2022-03-07 PROCEDURE — 1159F MED LIST DOCD IN RCRD: CPT | Mod: CPTII,S$GLB,, | Performed by: PODIATRIST

## 2022-03-07 PROCEDURE — 1159F PR MEDICATION LIST DOCUMENTED IN MEDICAL RECORD: ICD-10-PCS | Mod: CPTII,S$GLB,, | Performed by: PODIATRIST

## 2022-03-07 NOTE — PROGRESS NOTES
Subjective:      Patient ID: Efra Corey is a 62 y.o. male.    Chief Complaint: Foot Pain (Right foot )    Efra is a 62 y.o. male who presents to the podiatry clinic  with complaint of  right foot pain around the first intermetatarsal space into the big toe and second toe at times. Onset of the symptoms was several months ago. Precipitating event: none known. Current symptoms include: ability to bear weight, but with some pain and worsening symptoms after a period of activity feels like he is walking on a golf ball and there is burning and aching that lasts for even 45 minutes or more after he gets off his feet. Aggravating factors: any weight bearing. Symptoms have gradually worsened. Patient has had prior foot problems. Evaluation to date: plain films: severe arthritic changes to the first MTPJ. Treatment to date: stiff shoes. Patients rates pain 10/10 on pain scale when at its worst.    3/24/21: Patient returns for follow up right great toe hallux limitus, he relates that the injection helped some and he is wearing Hokas, also working to get a custom orthotic with steel shank. Relates it is better but considering surgery to fix the issue and seeking more information about this    5/12/21: patient returns for follow up right great toe pain with hallux limitus, ready to discuss and plan surgical intervention as conservative care has not helped and the pain persists.    7/7/21: patient returns for follow up right 1st MTPJ arthrodesis done yesterday there was some strike through on the dressing and he relates the oxycodone 5/325 is not helping with the pain much    7/14/21: Patient returns s/p 1 week right first MTPJ arthrodesis, no new complaints doing well overall, pain much improved    7/21/21: patient returns s/p 2 weeks right first MTPJ arthrodesis. Doing well no new complaints.     8/18/21: patient returns s/p 6 weeks right first MTPJ arthrodesis no new complaints doing well walking in the  boot.    1/21/22: Patient returns with continued right foot pain, there has been pain in the foot since surgery last July. Some shooting pain into the toe and pain under the metatarsal head when at toe push off during gait. Walking more on the lateral foot to take pressure off the first MTPJ area.     3/7/22: Patient returns for follow up right foot pain, injection last appt helped a lot and the pain is now minimal in a shoe. No new complaints noted.      Review of Systems   Constitutional: Negative for chills and fever.   Cardiovascular: Negative for claudication and leg swelling.   Respiratory: Negative for shortness of breath.    Skin: Negative for itching, nail changes and rash.   Musculoskeletal: Positive for arthritis. Negative for muscle cramps, muscle weakness and myalgias.        Right foot pain   Gastrointestinal: Negative for nausea and vomiting.   Neurological: Negative for focal weakness, loss of balance, numbness and paresthesias.           Objective:      Physical Exam  Constitutional:       General: He is not in acute distress.     Appearance: He is well-developed. He is not diaphoretic.   Cardiovascular:      Pulses:           Dorsalis pedis pulses are 2+ on the right side and 2+ on the left side.        Posterior tibial pulses are 2+ on the right side and 2+ on the left side.      Comments: < 3 sec capillary refill time to toes 1-5 bilateral. Toes and feet are warm to touch proximally with normal distal cooling b/l. There is some hair growth on the feet and toes b/l. There is no edema b/l. No spider veins or varicosities present b/l.     Musculoskeletal:      Comments: Equinus noted b/l ankles with < 10 deg DF noted. MMT 5/5 in DF/PF/Inv/Ev resistance with no reproduction of pain in any direction. Passive range of motion of ankle and pedal joints is painless b/l.    Right hallux in rectus position no motion at the MTPJ         Skin:     General: Skin is warm and dry.      Coloration: Skin is not  pale.      Findings: No abrasion, bruising, burn, ecchymosis, erythema, laceration, lesion, petechiae or rash.      Nails: There is no clubbing.      Comments: Skin temperature, texture and turgor within normal limits.    Incision right foot well healed with sutures removed today   Neurological:      Mental Status: He is alert and oriented to person, place, and time.      Sensory: No sensory deficit.      Motor: No tremor, atrophy or abnormal muscle tone.      Comments: Negative tinel sign bilateral.   Psychiatric:         Behavior: Behavior normal.               Assessment:       Encounter Diagnosis   Name Primary?    Sesamoiditis of right foot Yes         Plan:       Efra was seen today for foot pain.    Diagnoses and all orders for this visit:    Sesamoiditis of right foot      I counseled the patient on his conditions, their implications and medical management.    Follow up x-ray reveals toe in proper position with hardware intact    Injection helped calm the symptoms    Ambulate in supportive shoes only and avoid barefoot or flats    Return PRN    Lb Schrader DPM

## 2022-03-09 ENCOUNTER — TELEPHONE (OUTPATIENT)
Dept: DERMATOLOGY | Facility: CLINIC | Age: 62
End: 2022-03-09
Payer: COMMERCIAL

## 2022-03-09 NOTE — TELEPHONE ENCOUNTER
----- Message from Blanca Moise MA sent at 3/9/2022 11:40 AM CST -----  Type:  Sooner Apoointment Request    Caller is requesting a sooner appointment.  Caller declined first available appointment listed below.  Caller will not accept being placed on the waitlist and is requesting a message be sent to doctor.    Name of Caller:  pt  When is the first available appointment?  Nothing coming up  Symptoms:  has some concerning spots/hx of skin cancer  Best Call Back Number:  [phone] (home)     Additional Information:  please advise--thank you

## 2022-03-21 ENCOUNTER — OFFICE VISIT (OUTPATIENT)
Dept: ORTHOPEDICS | Facility: CLINIC | Age: 62
End: 2022-03-21
Payer: COMMERCIAL

## 2022-03-21 ENCOUNTER — HOSPITAL ENCOUNTER (OUTPATIENT)
Dept: RADIOLOGY | Facility: HOSPITAL | Age: 62
Discharge: HOME | End: 2022-03-21
Attending: ORTHOPAEDIC SURGERY
Payer: COMMERCIAL

## 2022-03-21 VITALS — WEIGHT: 183 LBS | HEIGHT: 66 IN | BODY MASS INDEX: 29.41 KG/M2

## 2022-03-21 DIAGNOSIS — M79.641 BILATERAL HAND PAIN: ICD-10-CM

## 2022-03-21 DIAGNOSIS — M18.11 ARTHRITIS OF CARPOMETACARPAL (CMC) JOINT OF RIGHT THUMB: Primary | ICD-10-CM

## 2022-03-21 DIAGNOSIS — M79.642 BILATERAL HAND PAIN: ICD-10-CM

## 2022-03-21 DIAGNOSIS — M18.11 ARTHRITIS OF CARPOMETACARPAL (CMC) JOINT OF RIGHT THUMB: ICD-10-CM

## 2022-03-21 PROCEDURE — 73130 X-RAY EXAM OF HAND: CPT | Mod: 26,RT,, | Performed by: RADIOLOGY

## 2022-03-21 PROCEDURE — 99203 PR OFFICE/OUTPT VISIT, NEW, LEVL III, 30-44 MIN: ICD-10-PCS | Mod: S$GLB,,, | Performed by: ORTHOPAEDIC SURGERY

## 2022-03-21 PROCEDURE — 99203 OFFICE O/P NEW LOW 30 MIN: CPT | Mod: S$GLB,,, | Performed by: ORTHOPAEDIC SURGERY

## 2022-03-21 PROCEDURE — 3008F PR BODY MASS INDEX (BMI) DOCUMENTED: ICD-10-PCS | Mod: CPTII,S$GLB,, | Performed by: ORTHOPAEDIC SURGERY

## 2022-03-21 PROCEDURE — 1159F PR MEDICATION LIST DOCUMENTED IN MEDICAL RECORD: ICD-10-PCS | Mod: CPTII,S$GLB,, | Performed by: ORTHOPAEDIC SURGERY

## 2022-03-21 PROCEDURE — 99999 PR PBB SHADOW E&M-EST. PATIENT-LVL III: CPT | Mod: PBBFAC,,, | Performed by: ORTHOPAEDIC SURGERY

## 2022-03-21 PROCEDURE — 73130 X-RAY EXAM OF HAND: CPT | Mod: TC,PO,RT

## 2022-03-21 PROCEDURE — 1159F MED LIST DOCD IN RCRD: CPT | Mod: CPTII,S$GLB,, | Performed by: ORTHOPAEDIC SURGERY

## 2022-03-21 PROCEDURE — 99999 PR PBB SHADOW E&M-EST. PATIENT-LVL III: ICD-10-PCS | Mod: PBBFAC,,, | Performed by: ORTHOPAEDIC SURGERY

## 2022-03-21 PROCEDURE — 1160F PR REVIEW ALL MEDS BY PRESCRIBER/CLIN PHARMACIST DOCUMENTED: ICD-10-PCS | Mod: CPTII,S$GLB,, | Performed by: ORTHOPAEDIC SURGERY

## 2022-03-21 PROCEDURE — 73130 XR HAND COMPLETE 3 VIEW RIGHT: ICD-10-PCS | Mod: 26,RT,, | Performed by: RADIOLOGY

## 2022-03-21 PROCEDURE — 1160F RVW MEDS BY RX/DR IN RCRD: CPT | Mod: CPTII,S$GLB,, | Performed by: ORTHOPAEDIC SURGERY

## 2022-03-21 PROCEDURE — 3008F BODY MASS INDEX DOCD: CPT | Mod: CPTII,S$GLB,, | Performed by: ORTHOPAEDIC SURGERY

## 2022-03-21 RX ORDER — MELOXICAM 15 MG/1
15 TABLET ORAL DAILY
Qty: 30 TABLET | Refills: 0 | Status: SHIPPED | OUTPATIENT
Start: 2022-03-21 | End: 2022-04-20

## 2022-03-21 NOTE — PROGRESS NOTES
3/21/2022    Chief Complaint:  Chief Complaint   Patient presents with    Left Hand - Pain, Numbness    Right Hand - Pain, Numbness       HPI:  Efra Corey is a 62 y.o. male, who presents to clinic today has a history of pain over the base of his right thumb.  He states that this has been going on for several months.  He states that it is gradually getting worse.  States that he is having trouble with pinch and .  States he has not had any treatment but has had an x-ray of the hand recently        PMHX:  Past Medical History:   Diagnosis Date    Colon polyp     Diverticulosis     DJD (degenerative joint disease) of knee        PSHX:  Past Surgical History:   Procedure Laterality Date    COLONOSCOPY      COLONOSCOPY N/A 11/5/2018    Procedure: COLONOSCOPY;  Surgeon: Efra Paul MD;  Location: [facility] ENDO;  Service: Endoscopy;  Laterality: N/A; 1 colon polyp removed, repeat in 5 years for surveillance; biopsy: Sessile serrated polyp    FUSION OF METATARSOPHALANGEAL JOINT Right 7/6/2021    Procedure: FUSION, MTP JOINT;  Surgeon: Lb Schrader DPM;  Location: [facility] OR;  Service: Podiatry;  Laterality: Right;    KNEE ARTHROSCOPY W/ ACL RECONSTRUCTION  2006    left; Dr. Rock Alanis    MOUTH SURGERY      SKIN CANCER EXCISION         FMHX:  Family History   Problem Relation Age of Onset    Colon cancer Neg Hx     Crohn's disease Neg Hx     Ulcerative colitis Neg Hx     Celiac disease Neg Hx        SOCHX:  Social History     Tobacco Use    Smoking status: Never Smoker    Smokeless tobacco: Never Used   Substance Use Topics    Alcohol use: Yes     Alcohol/week: 1.0 standard drink     Types: 1 Cans of beer per week     Comment: social       ALLERGIES:  Patient has no known allergies.    CURRENT MEDICATIONS:  Current Outpatient Medications on File Prior to Visit   Medication Sig Dispense Refill    acyclovir (ZOVIRAX) 400 MG tablet TAKE 1 TABLET BY MOUTH TWICE A  tablet 2     "dicyclomine (BENTYL) 20 mg tablet Take 1 tablet (20 mg total) by mouth 4 (four) times daily with meals and nightly. (Patient taking differently: Take 20 mg by mouth 4 (four) times daily as needed.) 240 tablet 5    gabapentin (NEURONTIN) 300 MG capsule Take 1 capsule (300 mg total) by mouth every evening. 30 capsule 11    methylPREDNISolone (MEDROL DOSEPACK) 4 mg tablet use as directed 1 each 0    oxyCODONE-acetaminophen (PERCOCET) 5-325 mg per tablet Take 1 tablet by mouth every 6 (six) hours as needed for Pain. 15 tablet 0    sildenafil (REVATIO) 20 mg Tab Take 1 tablet (20 mg total) by mouth 3 (three) times daily. 90 tablet 3     No current facility-administered medications on file prior to visit.       REVIEW OF SYSTEMS:  Review of Systems   Constitutional: Negative.    HENT: Negative.    Eyes: Negative.    Respiratory: Negative.    Cardiovascular: Negative.    Gastrointestinal: Negative.    Genitourinary: Negative.    Musculoskeletal: Positive for joint pain and neck pain.   Skin: Negative.    Neurological: Positive for tingling and weakness.   Endo/Heme/Allergies: Bruises/bleeds easily.   Psychiatric/Behavioral: Negative.        GENERAL PHYSICAL EXAM:   Ht 5' 6" (1.676 m)   Wt 83 kg (183 lb)   BMI 29.54 kg/m²    GEN: well developed, well nourished, no acute distress   HENT: Normocephalic, atraumatic   EYES: No discharge, conjunctiva normal   NECK: Supple, non-tender   PULM: No wheezing, no respiratory distress   CV: RRR   ABD: Soft, non-tender    ORTHO EXAM:   Examination of his right hand reveals that there is no edema.  There are no major skin changes.  Palpation does produce tenderness over the CMC joint of the thumb.  He has no other areas of significant tenderness.  He is able to flex and extend the thumb and all the fingers without pain or triggering.  He does have a mildly positive grind test.  He does have a 2+ radial pulse and sensation is intact in the median radial ulnar " distributions    RADIOLOGY:   X-rays of the right hand were taken in clinic today.  He is noted have mild arthritis of the thumb CMC joint.  There is no other significant pathology    ASSESSMENT:   Mild right thumb CMC arthritis    PLAN:  1. I did start him on a prescription of Mobic.    2. I did provide him with the information for a meta  splint    3. Will follow up with me on a p.r.n. basis

## 2022-03-29 ENCOUNTER — LAB VISIT (OUTPATIENT)
Dept: LAB | Facility: HOSPITAL | Age: 62
End: 2022-03-29
Attending: FAMILY MEDICINE
Payer: COMMERCIAL

## 2022-03-29 ENCOUNTER — OFFICE VISIT (OUTPATIENT)
Dept: FAMILY MEDICINE | Facility: CLINIC | Age: 62
End: 2022-03-29
Payer: COMMERCIAL

## 2022-03-29 VITALS
BODY MASS INDEX: 30 KG/M2 | SYSTOLIC BLOOD PRESSURE: 142 MMHG | DIASTOLIC BLOOD PRESSURE: 96 MMHG | WEIGHT: 185.88 LBS | HEART RATE: 64 BPM | OXYGEN SATURATION: 96 %

## 2022-03-29 DIAGNOSIS — R80.9 PROTEINURIA, UNSPECIFIED TYPE: ICD-10-CM

## 2022-03-29 DIAGNOSIS — R30.0 DYSURIA: ICD-10-CM

## 2022-03-29 DIAGNOSIS — R30.0 DYSURIA: Primary | ICD-10-CM

## 2022-03-29 LAB
ANION GAP SERPL CALC-SCNC: 9 MMOL/L (ref 8–16)
BILIRUB SERPL-MCNC: ABNORMAL MG/DL
BLOOD URINE, POC: ABNORMAL
BUN SERPL-MCNC: 19 MG/DL (ref 8–23)
CALCIUM SERPL-MCNC: 9.2 MG/DL (ref 8.7–10.5)
CHLORIDE SERPL-SCNC: 105 MMOL/L (ref 95–110)
CLARITY, POC UA: CLEAR
CO2 SERPL-SCNC: 25 MMOL/L (ref 23–29)
COLOR, POC UA: YELLOW
CREAT SERPL-MCNC: 1.2 MG/DL (ref 0.5–1.4)
EST. GFR  (AFRICAN AMERICAN): >60 ML/MIN/1.73 M^2
EST. GFR  (NON AFRICAN AMERICAN): >60 ML/MIN/1.73 M^2
GLUCOSE SERPL-MCNC: 110 MG/DL (ref 70–110)
GLUCOSE UR QL STRIP: ABNORMAL
KETONES UR QL STRIP: ABNORMAL
LEUKOCYTE ESTERASE URINE, POC: ABNORMAL
NITRITE, POC UA: ABNORMAL
PH, POC UA: 5
POTASSIUM SERPL-SCNC: 3.9 MMOL/L (ref 3.5–5.1)
PROTEIN, POC: ABNORMAL
SODIUM SERPL-SCNC: 139 MMOL/L (ref 136–145)
SPECIFIC GRAVITY, POC UA: 1.01
UROBILINOGEN, POC UA: ABNORMAL

## 2022-03-29 PROCEDURE — 3008F PR BODY MASS INDEX (BMI) DOCUMENTED: ICD-10-PCS | Mod: CPTII,S$GLB,, | Performed by: FAMILY MEDICINE

## 2022-03-29 PROCEDURE — 1159F MED LIST DOCD IN RCRD: CPT | Mod: CPTII,S$GLB,, | Performed by: FAMILY MEDICINE

## 2022-03-29 PROCEDURE — 99214 OFFICE O/P EST MOD 30 MIN: CPT | Mod: S$GLB,,, | Performed by: FAMILY MEDICINE

## 2022-03-29 PROCEDURE — 36415 COLL VENOUS BLD VENIPUNCTURE: CPT | Mod: PO | Performed by: FAMILY MEDICINE

## 2022-03-29 PROCEDURE — 3077F PR MOST RECENT SYSTOLIC BLOOD PRESSURE >= 140 MM HG: ICD-10-PCS | Mod: CPTII,S$GLB,, | Performed by: FAMILY MEDICINE

## 2022-03-29 PROCEDURE — 1159F PR MEDICATION LIST DOCUMENTED IN MEDICAL RECORD: ICD-10-PCS | Mod: CPTII,S$GLB,, | Performed by: FAMILY MEDICINE

## 2022-03-29 PROCEDURE — 99999 PR PBB SHADOW E&M-EST. PATIENT-LVL III: CPT | Mod: PBBFAC,,, | Performed by: FAMILY MEDICINE

## 2022-03-29 PROCEDURE — 1160F PR REVIEW ALL MEDS BY PRESCRIBER/CLIN PHARMACIST DOCUMENTED: ICD-10-PCS | Mod: CPTII,S$GLB,, | Performed by: FAMILY MEDICINE

## 2022-03-29 PROCEDURE — 81002 URINALYSIS NONAUTO W/O SCOPE: CPT | Mod: S$GLB,,, | Performed by: FAMILY MEDICINE

## 2022-03-29 PROCEDURE — 3077F SYST BP >= 140 MM HG: CPT | Mod: CPTII,S$GLB,, | Performed by: FAMILY MEDICINE

## 2022-03-29 PROCEDURE — 87086 URINE CULTURE/COLONY COUNT: CPT | Performed by: FAMILY MEDICINE

## 2022-03-29 PROCEDURE — 99214 PR OFFICE/OUTPT VISIT, EST, LEVL IV, 30-39 MIN: ICD-10-PCS | Mod: S$GLB,,, | Performed by: FAMILY MEDICINE

## 2022-03-29 PROCEDURE — 3080F DIAST BP >= 90 MM HG: CPT | Mod: CPTII,S$GLB,, | Performed by: FAMILY MEDICINE

## 2022-03-29 PROCEDURE — 3080F PR MOST RECENT DIASTOLIC BLOOD PRESSURE >= 90 MM HG: ICD-10-PCS | Mod: CPTII,S$GLB,, | Performed by: FAMILY MEDICINE

## 2022-03-29 PROCEDURE — 81002 POCT URINE DIPSTICK WITHOUT MICROSCOPE: ICD-10-PCS | Mod: S$GLB,,, | Performed by: FAMILY MEDICINE

## 2022-03-29 PROCEDURE — 3008F BODY MASS INDEX DOCD: CPT | Mod: CPTII,S$GLB,, | Performed by: FAMILY MEDICINE

## 2022-03-29 PROCEDURE — 99999 PR PBB SHADOW E&M-EST. PATIENT-LVL III: ICD-10-PCS | Mod: PBBFAC,,, | Performed by: FAMILY MEDICINE

## 2022-03-29 PROCEDURE — 1160F RVW MEDS BY RX/DR IN RCRD: CPT | Mod: CPTII,S$GLB,, | Performed by: FAMILY MEDICINE

## 2022-03-29 PROCEDURE — 80048 BASIC METABOLIC PNL TOTAL CA: CPT | Performed by: FAMILY MEDICINE

## 2022-03-29 RX ORDER — CIPROFLOXACIN 500 MG/1
500 TABLET ORAL EVERY 12 HOURS
Qty: 14 TABLET | Refills: 0 | Status: SHIPPED | OUTPATIENT
Start: 2022-03-29 | End: 2022-04-05

## 2022-03-29 NOTE — PROGRESS NOTES
Subjective:       Patient ID: Efra Corey is a 62 y.o. male.    Chief Complaint: Dark urine (Pt reports having dark red urine this morning, states it has cleared up since)     Here today for an acute visit.  He is a patient of Dr. Morris, new to me today.  He is c/o dark urine this am, which has improved since that time.  He denies burning with urination or increased frequency.  He denies abd pain, nausea, or fever.  He started Mobic with ortho about 1 week ago.  He did do work outside over the weekend.   He has been drinking more water since this AM.    Review of Systems   Constitutional: Negative for appetite change, fatigue and fever.   Respiratory: Negative for cough, shortness of breath and wheezing.    Cardiovascular: Negative for chest pain and palpitations.   Gastrointestinal: Negative for abdominal pain, constipation, diarrhea, nausea and vomiting.   Genitourinary: Negative for difficulty urinating and dysuria.   Neurological: Negative for dizziness, syncope, weakness and headaches.   Psychiatric/Behavioral: Negative for agitation, behavioral problems and confusion. The patient is not nervous/anxious.        Objective:      Vitals:    03/29/22 1334   BP: (!) 142/96   Pulse: 64   SpO2: 96%   Weight: 84.3 kg (185 lb 13.6 oz)      Physical Exam  Constitutional:       General: He is not in acute distress.  Cardiovascular:      Rate and Rhythm: Normal rate and regular rhythm.      Heart sounds: Normal heart sounds. No murmur heard.  Pulmonary:      Effort: Pulmonary effort is normal. No respiratory distress.      Breath sounds: Normal breath sounds. No wheezing, rhonchi or rales.   Abdominal:      General: Abdomen is flat. There is no distension.      Palpations: Abdomen is soft. There is no mass.      Tenderness: There is no abdominal tenderness. There is no right CVA tenderness, left CVA tenderness or guarding.   Musculoskeletal:         General: No swelling.   Skin:     General: Skin is warm and dry.    Neurological:      General: No focal deficit present.      Mental Status: He is alert.   Psychiatric:         Mood and Affect: Mood normal.         Behavior: Behavior normal.         Thought Content: Thought content normal.         Assessment:       1. Dysuria    2. Proteinuria, unspecified type        Plan:       Dysuria  -     Cancel: POCT URINE DIPSTICK WITHOUT MICROSCOPE  -     POCT URINE DIPSTICK WITHOUT MICROSCOPE  -     Urine culture; Future; Expected date: 03/29/2022  -     Basic Metabolic Panel; Future; Expected date: 03/29/2022  -     ciprofloxacin HCl (CIPRO) 500 MG tablet; Take 1 tablet (500 mg total) by mouth every 12 (twelve) hours. for 7 days  Dispense: 14 tablet; Refill: 0    Proteinuria, unspecified type  -     Basic Metabolic Panel; Future; Expected date: 03/29/2022          Medication List with Changes/Refills   New Medications    CIPROFLOXACIN HCL (CIPRO) 500 MG TABLET    Take 1 tablet (500 mg total) by mouth every 12 (twelve) hours. for 7 days   Current Medications    ACYCLOVIR (ZOVIRAX) 400 MG TABLET    TAKE 1 TABLET BY MOUTH TWICE A DAY    DICYCLOMINE (BENTYL) 20 MG TABLET    Take 1 tablet (20 mg total) by mouth 4 (four) times daily with meals and nightly.    GABAPENTIN (NEURONTIN) 300 MG CAPSULE    Take 1 capsule (300 mg total) by mouth every evening.    MELOXICAM (MOBIC) 15 MG TABLET    Take 1 tablet (15 mg total) by mouth once daily.    OXYCODONE-ACETAMINOPHEN (PERCOCET) 5-325 MG PER TABLET    Take 1 tablet by mouth every 6 (six) hours as needed for Pain.    SILDENAFIL (REVATIO) 20 MG TAB    Take 1 tablet (20 mg total) by mouth 3 (three) times daily.

## 2022-03-30 LAB — BACTERIA UR CULT: NO GROWTH

## 2022-05-02 ENCOUNTER — OFFICE VISIT (OUTPATIENT)
Dept: ORTHOPEDICS | Facility: CLINIC | Age: 62
End: 2022-05-02
Payer: COMMERCIAL

## 2022-05-02 VITALS — HEIGHT: 66 IN | BODY MASS INDEX: 29.87 KG/M2 | WEIGHT: 185.88 LBS

## 2022-05-02 DIAGNOSIS — M18.0 PRIMARY ARTHROSIS OF FIRST CARPOMETACARPAL JOINTS, BILATERAL: Primary | ICD-10-CM

## 2022-05-02 PROCEDURE — 99999 PR PBB SHADOW E&M-EST. PATIENT-LVL II: CPT | Mod: PBBFAC,,, | Performed by: ORTHOPAEDIC SURGERY

## 2022-05-02 PROCEDURE — 99213 PR OFFICE/OUTPT VISIT, EST, LEVL III, 20-29 MIN: ICD-10-PCS | Mod: S$GLB,,, | Performed by: ORTHOPAEDIC SURGERY

## 2022-05-02 PROCEDURE — 3008F PR BODY MASS INDEX (BMI) DOCUMENTED: ICD-10-PCS | Mod: CPTII,S$GLB,, | Performed by: ORTHOPAEDIC SURGERY

## 2022-05-02 PROCEDURE — 99999 PR PBB SHADOW E&M-EST. PATIENT-LVL II: ICD-10-PCS | Mod: PBBFAC,,, | Performed by: ORTHOPAEDIC SURGERY

## 2022-05-02 PROCEDURE — 99213 OFFICE O/P EST LOW 20 MIN: CPT | Mod: S$GLB,,, | Performed by: ORTHOPAEDIC SURGERY

## 2022-05-02 PROCEDURE — 3008F BODY MASS INDEX DOCD: CPT | Mod: CPTII,S$GLB,, | Performed by: ORTHOPAEDIC SURGERY

## 2022-05-02 NOTE — PROGRESS NOTES
Mr. Corey returns to clinic today.  Has a history of bilateral thumb CMC arthritis.  The right is worse than the left.  He states he has been wearing the CMC splint which has provided a small amount of relief.  He states that he has stopped taking the Mobic because he noted that he was having some blood in his urine shortly after beginning taking and medication.    Physical exam:  Examination the right hand and wrist reveals that there is no edema.  There are no skin changes.  He does have prominence of the CMC joint.  Palpation over the joint does produce tenderness.  He has a mildly positive grind test.  He does have sensation intact in the median radial ulnar distribution capillary refill is less than 2 seconds    Examination of the left hand and wrist reveals that there is no edema.  There are no skin changes.  He does have prominence of the CMC joint.  Palpation over the joint produces mild tenderness.  Has a minimally positive grind test.  He is neurovascularly intact.    Assessment:  Bilateral thumb CMC arthritis    Plan:    1. At this point the patient will continue splinting and activity modification    2. I have discussed the possibility of steroid injection in the future if he continues to have pain he may consider that treatment right    3. Will follow up with me on a p.r.n. basis

## 2022-05-09 ENCOUNTER — PATIENT MESSAGE (OUTPATIENT)
Dept: SMOKING CESSATION | Facility: CLINIC | Age: 62
End: 2022-05-09
Payer: COMMERCIAL

## 2022-05-11 NOTE — TELEPHONE ENCOUNTER
No new care gaps identified.  [facility] Embedded Care Gaps. Reference number: [id number]. 5/11/2022   2:57:43 PM CDT

## 2022-05-11 NOTE — TELEPHONE ENCOUNTER
Refill Routing Note   Medication(s) are not appropriate for processing by [facility] for the following reason(s):      - Patient has been seen in the ED/Hospital since the last PCP visit    [facility] action(s):  Defer       Medication Therapy Plan: Last PCP Visit: 6/16/2021 Last Admission: 7/6/2021  Medication reconciliation completed: No     Appointments  past 12m or future 3m with PCP    Date Provider   Last Visit   6/16/2021 Fito Morris MD   Next Visit   Visit date not found Fito Morris MD   ED visits in past 90 days: 0        Note composed:5:23 PM 05/11/2022

## 2022-05-12 RX ORDER — ACYCLOVIR 400 MG/1
TABLET ORAL
Qty: 180 TABLET | Refills: 2 | Status: SHIPPED | OUTPATIENT
Start: 2022-05-12

## 2022-05-19 ENCOUNTER — PATIENT OUTREACH (OUTPATIENT)
Dept: ADMINISTRATIVE | Facility: OTHER | Age: 62
End: 2022-05-19
Payer: COMMERCIAL

## 2022-05-19 NOTE — PROGRESS NOTES
Health Maintenance Due   Topic Date Due    Shingles Vaccine (1 of 2) Never done    COVID-19 Vaccine (3 - Booster for Moderna series) 09/13/2021     Updates were requested from care everywhere.  Chart was reviewed for overdue Proactive [facility] Encounters (JESSICA) topics (CRS, Breast Cancer Screening, Eye exam)  Health Maintenance has been updated.  [facility] immunization registry triggered.  Immunizations were reconciled.

## 2022-05-20 ENCOUNTER — OFFICE VISIT (OUTPATIENT)
Dept: DERMATOLOGY | Facility: CLINIC | Age: 62
End: 2022-05-20
Payer: COMMERCIAL

## 2022-05-20 VITALS — HEIGHT: 66 IN | WEIGHT: 185.88 LBS | RESPIRATION RATE: 18 BRPM | BODY MASS INDEX: 29.87 KG/M2

## 2022-05-20 DIAGNOSIS — Z85.828 HX OF NONMELANOMA SKIN CANCER: Primary | ICD-10-CM

## 2022-05-20 DIAGNOSIS — L57.0 ACTINIC KERATOSIS: ICD-10-CM

## 2022-05-20 DIAGNOSIS — L82.1 SEBORRHEIC KERATOSES: ICD-10-CM

## 2022-05-20 DIAGNOSIS — Z12.83 ENCOUNTER FOR SCREENING FOR MALIGNANT NEOPLASM OF SKIN: ICD-10-CM

## 2022-05-20 PROCEDURE — 17000 DESTRUCT PREMALG LESION: CPT | Mod: S$GLB,,, | Performed by: STUDENT IN AN ORGANIZED HEALTH CARE EDUCATION/TRAINING PROGRAM

## 2022-05-20 PROCEDURE — 1159F PR MEDICATION LIST DOCUMENTED IN MEDICAL RECORD: ICD-10-PCS | Mod: CPTII,S$GLB,, | Performed by: STUDENT IN AN ORGANIZED HEALTH CARE EDUCATION/TRAINING PROGRAM

## 2022-05-20 PROCEDURE — 99999 PR PBB SHADOW E&M-EST. PATIENT-LVL III: ICD-10-PCS | Mod: PBBFAC,,, | Performed by: STUDENT IN AN ORGANIZED HEALTH CARE EDUCATION/TRAINING PROGRAM

## 2022-05-20 PROCEDURE — 17003 DESTRUCTION, PREMALIGNANT LESIONS; SECOND THROUGH 14 LESIONS: ICD-10-PCS | Mod: S$GLB,,, | Performed by: STUDENT IN AN ORGANIZED HEALTH CARE EDUCATION/TRAINING PROGRAM

## 2022-05-20 PROCEDURE — 3008F PR BODY MASS INDEX (BMI) DOCUMENTED: ICD-10-PCS | Mod: CPTII,S$GLB,, | Performed by: STUDENT IN AN ORGANIZED HEALTH CARE EDUCATION/TRAINING PROGRAM

## 2022-05-20 PROCEDURE — 3008F BODY MASS INDEX DOCD: CPT | Mod: CPTII,S$GLB,, | Performed by: STUDENT IN AN ORGANIZED HEALTH CARE EDUCATION/TRAINING PROGRAM

## 2022-05-20 PROCEDURE — 99999 PR PBB SHADOW E&M-EST. PATIENT-LVL III: CPT | Mod: PBBFAC,,, | Performed by: STUDENT IN AN ORGANIZED HEALTH CARE EDUCATION/TRAINING PROGRAM

## 2022-05-20 PROCEDURE — 99203 PR OFFICE/OUTPT VISIT, NEW, LEVL III, 30-44 MIN: ICD-10-PCS | Mod: 25,S$GLB,, | Performed by: STUDENT IN AN ORGANIZED HEALTH CARE EDUCATION/TRAINING PROGRAM

## 2022-05-20 PROCEDURE — 1160F RVW MEDS BY RX/DR IN RCRD: CPT | Mod: CPTII,S$GLB,, | Performed by: STUDENT IN AN ORGANIZED HEALTH CARE EDUCATION/TRAINING PROGRAM

## 2022-05-20 PROCEDURE — 99203 OFFICE O/P NEW LOW 30 MIN: CPT | Mod: 25,S$GLB,, | Performed by: STUDENT IN AN ORGANIZED HEALTH CARE EDUCATION/TRAINING PROGRAM

## 2022-05-20 PROCEDURE — 1159F MED LIST DOCD IN RCRD: CPT | Mod: CPTII,S$GLB,, | Performed by: STUDENT IN AN ORGANIZED HEALTH CARE EDUCATION/TRAINING PROGRAM

## 2022-05-20 PROCEDURE — 1160F PR REVIEW ALL MEDS BY PRESCRIBER/CLIN PHARMACIST DOCUMENTED: ICD-10-PCS | Mod: CPTII,S$GLB,, | Performed by: STUDENT IN AN ORGANIZED HEALTH CARE EDUCATION/TRAINING PROGRAM

## 2022-05-20 PROCEDURE — 17003 DESTRUCT PREMALG LES 2-14: CPT | Mod: S$GLB,,, | Performed by: STUDENT IN AN ORGANIZED HEALTH CARE EDUCATION/TRAINING PROGRAM

## 2022-05-20 PROCEDURE — 17000 PR DESTRUCTION(LASER SURGERY,CRYOSURGERY,CHEMOSURGERY),PREMALIGNANT LESIONS,FIRST LESION: ICD-10-PCS | Mod: S$GLB,,, | Performed by: STUDENT IN AN ORGANIZED HEALTH CARE EDUCATION/TRAINING PROGRAM

## 2022-05-20 RX ORDER — FLUOROURACIL 50 MG/G
CREAM TOPICAL
Qty: 40 G | Refills: 1 | Status: SHIPPED | OUTPATIENT
Start: 2022-05-20

## 2022-05-20 NOTE — PATIENT INSTRUCTIONS

## 2022-08-26 DIAGNOSIS — N52.9 ERECTILE DYSFUNCTION, UNSPECIFIED ERECTILE DYSFUNCTION TYPE: ICD-10-CM

## 2022-08-26 RX ORDER — SILDENAFIL CITRATE 20 MG/1
TABLET ORAL
Qty: 90 TABLET | Refills: 0 | Status: SHIPPED | OUTPATIENT
Start: 2022-08-26 | End: 2022-10-15 | Stop reason: SDUPTHER

## 2022-08-26 NOTE — TELEPHONE ENCOUNTER
No new care gaps identified.  [facility] Embedded Care Gaps. Reference number: [id number]. 8/26/2022   10:33:17 AM LINDAT

## 2022-08-26 NOTE — TELEPHONE ENCOUNTER
Refill Decision Note   Efra Madhav  is requesting a refill authorization.  Brief Assessment and Rationale for Refill:  Approve     Medication Therapy Plan:       Medication Reconciliation Completed: No   Comments:     No Care Gaps recommended.     Note composed:10:42 AM 08/26/2022

## 2022-09-27 ENCOUNTER — LAB VISIT (OUTPATIENT)
Dept: LAB | Facility: HOSPITAL | Age: 62
End: 2022-09-27
Attending: FAMILY MEDICINE
Payer: COMMERCIAL

## 2022-09-27 ENCOUNTER — OFFICE VISIT (OUTPATIENT)
Dept: FAMILY MEDICINE | Facility: CLINIC | Age: 62
End: 2022-09-27
Payer: COMMERCIAL

## 2022-09-27 VITALS
BODY MASS INDEX: 30.69 KG/M2 | RESPIRATION RATE: 18 BRPM | HEART RATE: 61 BPM | TEMPERATURE: 98 F | OXYGEN SATURATION: 98 % | WEIGHT: 190.94 LBS | HEIGHT: 66 IN | SYSTOLIC BLOOD PRESSURE: 130 MMHG | DIASTOLIC BLOOD PRESSURE: 80 MMHG

## 2022-09-27 DIAGNOSIS — M47.22 OSTEOARTHRITIS OF SPINE WITH RADICULOPATHY, CERVICAL REGION: ICD-10-CM

## 2022-09-27 DIAGNOSIS — Z00.00 PREVENTATIVE HEALTH CARE: Primary | ICD-10-CM

## 2022-09-27 DIAGNOSIS — Z00.00 PREVENTATIVE HEALTH CARE: ICD-10-CM

## 2022-09-27 DIAGNOSIS — M54.12 RADICULOPATHY, CERVICAL REGION: ICD-10-CM

## 2022-09-27 LAB
ALBUMIN SERPL BCP-MCNC: 4.3 G/DL (ref 3.5–5.2)
ALP SERPL-CCNC: 79 U/L (ref 55–135)
ALT SERPL W/O P-5'-P-CCNC: 17 U/L (ref 10–44)
ANION GAP SERPL CALC-SCNC: 10 MMOL/L (ref 8–16)
AST SERPL-CCNC: 21 U/L (ref 10–40)
BASOPHILS # BLD AUTO: 0.08 K/UL (ref 0–0.2)
BASOPHILS NFR BLD: 1.1 % (ref 0–1.9)
BILIRUB SERPL-MCNC: 0.8 MG/DL (ref 0.1–1)
BUN SERPL-MCNC: 16 MG/DL (ref 8–23)
CALCIUM SERPL-MCNC: 9.5 MG/DL (ref 8.7–10.5)
CHLORIDE SERPL-SCNC: 106 MMOL/L (ref 95–110)
CHOLEST SERPL-MCNC: 209 MG/DL (ref 120–199)
CHOLEST/HDLC SERPL: 4.2 {RATIO} (ref 2–5)
CO2 SERPL-SCNC: 24 MMOL/L (ref 23–29)
COMPLEXED PSA SERPL-MCNC: 0.88 NG/ML (ref 0–4)
CREAT SERPL-MCNC: 1.2 MG/DL (ref 0.5–1.4)
DIFFERENTIAL METHOD: NORMAL
EOSINOPHIL # BLD AUTO: 0.5 K/UL (ref 0–0.5)
EOSINOPHIL NFR BLD: 6.2 % (ref 0–8)
ERYTHROCYTE [DISTWIDTH] IN BLOOD BY AUTOMATED COUNT: 13.2 % (ref 11.5–14.5)
EST. GFR  (NO RACE VARIABLE): >60 ML/MIN/1.73 M^2
GLUCOSE SERPL-MCNC: 89 MG/DL (ref 70–110)
HCT VFR BLD AUTO: 49.2 % (ref 40–54)
HDLC SERPL-MCNC: 50 MG/DL (ref 40–75)
HDLC SERPL: 23.9 % (ref 20–50)
HGB BLD-MCNC: 16.6 G/DL (ref 14–18)
IMM GRANULOCYTES # BLD AUTO: 0.03 K/UL (ref 0–0.04)
IMM GRANULOCYTES NFR BLD AUTO: 0.4 % (ref 0–0.5)
LDLC SERPL CALC-MCNC: 137.2 MG/DL (ref 63–159)
LYMPHOCYTES # BLD AUTO: 1.6 K/UL (ref 1–4.8)
LYMPHOCYTES NFR BLD: 21.9 % (ref 18–48)
MCH RBC QN AUTO: 30.5 PG (ref 27–31)
MCHC RBC AUTO-ENTMCNC: 33.7 G/DL (ref 32–36)
MCV RBC AUTO: 90 FL (ref 82–98)
MONOCYTES # BLD AUTO: 0.6 K/UL (ref 0.3–1)
MONOCYTES NFR BLD: 7.8 % (ref 4–15)
NEUTROPHILS # BLD AUTO: 4.6 K/UL (ref 1.8–7.7)
NEUTROPHILS NFR BLD: 62.6 % (ref 38–73)
NONHDLC SERPL-MCNC: 159 MG/DL
NRBC BLD-RTO: 0 /100 WBC
PLATELET # BLD AUTO: 192 K/UL (ref 150–450)
PMV BLD AUTO: 11 FL (ref 9.2–12.9)
POTASSIUM SERPL-SCNC: 4.6 MMOL/L (ref 3.5–5.1)
PROT SERPL-MCNC: 7.6 G/DL (ref 6–8.4)
RBC # BLD AUTO: 5.45 M/UL (ref 4.6–6.2)
SODIUM SERPL-SCNC: 140 MMOL/L (ref 136–145)
TRIGL SERPL-MCNC: 109 MG/DL (ref 30–150)
TSH SERPL DL<=0.005 MIU/L-ACNC: 2.82 UIU/ML (ref 0.4–4)
WBC # BLD AUTO: 7.29 K/UL (ref 3.9–12.7)

## 2022-09-27 PROCEDURE — 3079F PR MOST RECENT DIASTOLIC BLOOD PRESSURE 80-89 MM HG: ICD-10-PCS | Mod: CPTII,S$GLB,, | Performed by: FAMILY MEDICINE

## 2022-09-27 PROCEDURE — 3008F BODY MASS INDEX DOCD: CPT | Mod: CPTII,S$GLB,, | Performed by: FAMILY MEDICINE

## 2022-09-27 PROCEDURE — 84443 ASSAY THYROID STIM HORMONE: CPT | Performed by: FAMILY MEDICINE

## 2022-09-27 PROCEDURE — 90472 IMMUNIZATION ADMIN EACH ADD: CPT | Mod: S$GLB,,, | Performed by: FAMILY MEDICINE

## 2022-09-27 PROCEDURE — 1159F MED LIST DOCD IN RCRD: CPT | Mod: CPTII,S$GLB,, | Performed by: FAMILY MEDICINE

## 2022-09-27 PROCEDURE — 99396 PREV VISIT EST AGE 40-64: CPT | Mod: 25,S$GLB,, | Performed by: FAMILY MEDICINE

## 2022-09-27 PROCEDURE — 36415 COLL VENOUS BLD VENIPUNCTURE: CPT | Mod: PO | Performed by: FAMILY MEDICINE

## 2022-09-27 PROCEDURE — 99999 PR PBB SHADOW E&M-EST. PATIENT-LVL III: ICD-10-PCS | Mod: PBBFAC,,, | Performed by: FAMILY MEDICINE

## 2022-09-27 PROCEDURE — 90471 FLU VACCINE (QUAD) GREATER THAN OR EQUAL TO 3YO PRESERVATIVE FREE IM: ICD-10-PCS | Mod: S$GLB,,, | Performed by: FAMILY MEDICINE

## 2022-09-27 PROCEDURE — 1159F PR MEDICATION LIST DOCUMENTED IN MEDICAL RECORD: ICD-10-PCS | Mod: CPTII,S$GLB,, | Performed by: FAMILY MEDICINE

## 2022-09-27 PROCEDURE — 3079F DIAST BP 80-89 MM HG: CPT | Mod: CPTII,S$GLB,, | Performed by: FAMILY MEDICINE

## 2022-09-27 PROCEDURE — 80053 COMPREHEN METABOLIC PANEL: CPT | Performed by: FAMILY MEDICINE

## 2022-09-27 PROCEDURE — 3075F SYST BP GE 130 - 139MM HG: CPT | Mod: CPTII,S$GLB,, | Performed by: FAMILY MEDICINE

## 2022-09-27 PROCEDURE — 3008F PR BODY MASS INDEX (BMI) DOCUMENTED: ICD-10-PCS | Mod: CPTII,S$GLB,, | Performed by: FAMILY MEDICINE

## 2022-09-27 PROCEDURE — 99396 PR PREVENTIVE VISIT,EST,40-64: ICD-10-PCS | Mod: 25,S$GLB,, | Performed by: FAMILY MEDICINE

## 2022-09-27 PROCEDURE — 80061 LIPID PANEL: CPT | Performed by: FAMILY MEDICINE

## 2022-09-27 PROCEDURE — 90715 TDAP VACCINE GREATER THAN OR EQUAL TO 7YO IM: ICD-10-PCS | Mod: S$GLB,,, | Performed by: FAMILY MEDICINE

## 2022-09-27 PROCEDURE — 90715 TDAP VACCINE 7 YRS/> IM: CPT | Mod: S$GLB,,, | Performed by: FAMILY MEDICINE

## 2022-09-27 PROCEDURE — 99999 PR PBB SHADOW E&M-EST. PATIENT-LVL III: CPT | Mod: PBBFAC,,, | Performed by: FAMILY MEDICINE

## 2022-09-27 PROCEDURE — 90471 IMMUNIZATION ADMIN: CPT | Mod: S$GLB,,, | Performed by: FAMILY MEDICINE

## 2022-09-27 PROCEDURE — 84153 ASSAY OF PSA TOTAL: CPT | Performed by: FAMILY MEDICINE

## 2022-09-27 PROCEDURE — 1160F PR REVIEW ALL MEDS BY PRESCRIBER/CLIN PHARMACIST DOCUMENTED: ICD-10-PCS | Mod: CPTII,S$GLB,, | Performed by: FAMILY MEDICINE

## 2022-09-27 PROCEDURE — 90686 FLU VACCINE (QUAD) GREATER THAN OR EQUAL TO 3YO PRESERVATIVE FREE IM: ICD-10-PCS | Mod: S$GLB,,, | Performed by: FAMILY MEDICINE

## 2022-09-27 PROCEDURE — 90686 IIV4 VACC NO PRSV 0.5 ML IM: CPT | Mod: S$GLB,,, | Performed by: FAMILY MEDICINE

## 2022-09-27 PROCEDURE — 85025 COMPLETE CBC W/AUTO DIFF WBC: CPT | Performed by: FAMILY MEDICINE

## 2022-09-27 PROCEDURE — 90472 TDAP VACCINE GREATER THAN OR EQUAL TO 7YO IM: ICD-10-PCS | Mod: S$GLB,,, | Performed by: FAMILY MEDICINE

## 2022-09-27 PROCEDURE — 1160F RVW MEDS BY RX/DR IN RCRD: CPT | Mod: CPTII,S$GLB,, | Performed by: FAMILY MEDICINE

## 2022-09-27 PROCEDURE — 3075F PR MOST RECENT SYSTOLIC BLOOD PRESS GE 130-139MM HG: ICD-10-PCS | Mod: CPTII,S$GLB,, | Performed by: FAMILY MEDICINE

## 2022-09-27 RX ORDER — GABAPENTIN 300 MG/1
300 CAPSULE ORAL NIGHTLY
Qty: 30 CAPSULE | Refills: 11 | Status: SHIPPED | OUTPATIENT
Start: 2022-09-27 | End: 2023-09-27

## 2022-09-27 NOTE — PROGRESS NOTES
Subjective:       Patient ID: Efra Corey is a 62 y.o. male.    Chief Complaint: Preventative Health Care (Physical, fasting) and Dizziness    Patient presents with:  Preventative Health Care: Physical, fasting  Dizziness      Overall feeling well    C/o intermittent episodes of spinning sensation.  They typically occurred in the morning. Last episode was 6 months ago.  Some feelings of being unsteady when looking up.      Past Medical History:    Colon polyp - colonoscopy                                                   Past Surgical History:    KNEE ARTHROSCOPY W/ ACL RECONSTRUCTION                       Comment:left; Dr. Rock Alanis    MOUTH SURGERY                                                  No Known Allergies    Social History    Marital Status: Single              Spouse Name:                       Years of Education:                 Number of children: 2             Occupational History  Occupation          Employer            Comment               [employer]                         Social History Main Topics    Smoking Status: Never Smoker                      Smokeless Status: Never Used                        Alcohol Use: Yes                Comment: social    Drug Use: No              Sexual Activity: Not on file          Other Topics            Concern    None on file    Social History Narrative    : wife  of breast cancer      Exercise: regular      From [place], [place]    Current Outpatient Medications on File Prior to Visit:  acyclovir (ZOVIRAX) 400 MG tablet, TAKE 1 TABLET (400 MG TOTAL) BY MOUTH 2 (TWO) TIMES DAILY., Disp: 60 tablet, Rfl: 9  sildenafil (REVATIO) 20 mg Tab, Take 1 tablet (20 mg total) by mouth 3 (three) times daily., Disp: 30 tablet, Rfl: 5    No current facility-administered medications on file prior to visit.     No family history on file.              Review of Systems   Constitutional:  Negative for appetite change, chills, fever and unexpected  "weight change.   HENT:  Negative for sore throat and trouble swallowing.    Eyes:  Negative for pain and visual disturbance.   Respiratory:  Negative for cough, chest tightness, shortness of breath and wheezing.    Cardiovascular:  Negative for chest pain, palpitations and leg swelling.   Gastrointestinal:  Negative for abdominal pain, blood in stool, constipation, diarrhea and nausea.   Genitourinary:  Negative for difficulty urinating, dysuria and hematuria.   Musculoskeletal:  Positive for arthralgias (shoulders and knees), neck pain and neck stiffness. Negative for gait problem.   Skin:  Negative for rash and wound.   Neurological:  Negative for dizziness, weakness, light-headedness and headaches.   Hematological:  Negative for adenopathy.   Psychiatric/Behavioral:  Negative for dysphoric mood.      Objective:       /80   Pulse 61   Temp 97.9 °F (36.6 °C) (Oral)   Resp 18   Ht 5' 6" (1.676 m)   Wt 86.6 kg (190 lb 14.7 oz)   SpO2 98%   BMI 30.81 kg/m²     Physical Exam  Constitutional:       General: He is not in acute distress.     Appearance: He is well-developed.   HENT:      Head: Normocephalic and atraumatic.      Right Ear: External ear normal.      Left Ear: External ear normal.      Mouth/Throat:      Pharynx: Uvula midline. No oropharyngeal exudate.   Eyes:      General: Lids are normal.      Conjunctiva/sclera: Conjunctivae normal.      Pupils: Pupils are equal, round, and reactive to light.   Neck:      Thyroid: No thyroid mass or thyromegaly.      Trachea: Phonation normal.   Cardiovascular:      Rate and Rhythm: Normal rate and regular rhythm.      Heart sounds: Normal heart sounds. No murmur heard.    No friction rub. No gallop.   Pulmonary:      Effort: Pulmonary effort is normal. No respiratory distress.      Breath sounds: Normal breath sounds. No wheezing or rales.   Abdominal:      General: Bowel sounds are normal. There is no distension.      Palpations: Abdomen is soft. There is " Anemia, Supratherapeutic INR, Dark Stools no mass.      Tenderness: There is no abdominal tenderness. There is no guarding or rebound.   Musculoskeletal:      Right shoulder: Crepitus present. No tenderness or bony tenderness. Decreased range of motion. Normal strength.      Left shoulder: Crepitus present. No tenderness or bony tenderness. Decreased range of motion. Normal strength.      Cervical back: Full passive range of motion without pain, normal range of motion and neck supple. No spasms.      Right knee: Normal range of motion. Abnormal patellar mobility (some crepitus).      Left knee: Decreased range of motion (limited extension). Tenderness present over the medial joint line and lateral joint line.   Lymphadenopathy:      Cervical: No cervical adenopathy.   Skin:     General: Skin is warm and dry.   Neurological:      Mental Status: He is alert and oriented to person, place, and time.      Cranial Nerves: No cranial nerve deficit.      Coordination: Coordination normal.   Psychiatric:         Speech: Speech normal.         Behavior: Behavior normal.         Thought Content: Thought content normal.         Judgment: Judgment normal.     positive Tinnels bilaterally (worse on the left)    Results for orders placed or performed in visit on 03/29/22   Basic Metabolic Panel   Result Value Ref Range    Sodium 139 136 - 145 mmol/L    Potassium 3.9 3.5 - 5.1 mmol/L    Chloride 105 95 - 110 mmol/L    CO2 25 23 - 29 mmol/L    Glucose 110 70 - 110 mg/dL    BUN 19 8 - 23 mg/dL    Creatinine 1.2 0.5 - 1.4 mg/dL    Calcium 9.2 8.7 - 10.5 mg/dL    Anion Gap 9 8 - 16 mmol/L    eGFR if African American >60.0 >60 mL/min/1.73 m^2    eGFR if non African American >60.0 >60 mL/min/1.73 m^2       Assessment:       1. Preventative health care    2. Osteoarthritis of spine with radiculopathy, cervical region    3. Radiculopathy, cervical region        Plan:       Preventative health care  -     CBC Auto Differential; Future; Expected date: 09/27/2022  -     Comprehensive  Metabolic Panel; Future; Expected date: 09/27/2022  -     Lipid Panel; Future; Expected date: 09/27/2022  -     PSA, Screening; Future; Expected date: 09/27/2022  -     TSH; Future; Expected date: 09/27/2022  -     (In Office Administered) Tdap Vaccine    Osteoarthritis of spine with radiculopathy, cervical region  -     gabapentin (NEURONTIN) 300 MG capsule; Take 1 capsule (300 mg total) by mouth every evening.  Dispense: 30 capsule; Refill: 11    Radiculopathy, cervical region  -     gabapentin (NEURONTIN) 300 MG capsule; Take 1 capsule (300 mg total) by mouth every evening.  Dispense: 30 capsule; Refill: 11    Labs today  Discussed vertigo exercises  Counseled on regular exercise, maintenance of a healthy weight, balanced diet rich in fruits/vegetables and lean protein, and avoidance of unhealthy habits like smoking and excessive alcohol intake.

## 2022-10-15 DIAGNOSIS — N52.9 ERECTILE DYSFUNCTION, UNSPECIFIED ERECTILE DYSFUNCTION TYPE: ICD-10-CM

## 2022-10-15 NOTE — TELEPHONE ENCOUNTER
No new care gaps identified.  [facility] Embedded Care Gaps. Reference number: [id number]. 10/15/2022   12:00:01 PM CDT

## 2022-10-16 RX ORDER — SILDENAFIL CITRATE 20 MG/1
20 TABLET ORAL 3 TIMES DAILY
Qty: 90 TABLET | Refills: 0 | Status: SHIPPED | OUTPATIENT
Start: 2022-10-16 | End: 2023-01-30 | Stop reason: SDUPTHER

## 2023-01-09 ENCOUNTER — OFFICE VISIT (OUTPATIENT)
Dept: ORTHOPEDICS | Facility: CLINIC | Age: 63
End: 2023-01-09
Payer: COMMERCIAL

## 2023-01-09 DIAGNOSIS — M65.312 TRIGGER FINGER OF LEFT THUMB: Primary | ICD-10-CM

## 2023-01-09 PROCEDURE — 99213 PR OFFICE/OUTPT VISIT, EST, LEVL III, 20-29 MIN: ICD-10-PCS | Mod: 25,S$GLB,, | Performed by: ORTHOPAEDIC SURGERY

## 2023-01-09 PROCEDURE — 20550 TENDON SHEATH: ICD-10-PCS | Mod: FA,S$GLB,, | Performed by: ORTHOPAEDIC SURGERY

## 2023-01-09 PROCEDURE — 99999 PR PBB SHADOW E&M-EST. PATIENT-LVL II: CPT | Mod: PBBFAC,,, | Performed by: ORTHOPAEDIC SURGERY

## 2023-01-09 PROCEDURE — 99213 OFFICE O/P EST LOW 20 MIN: CPT | Mod: 25,S$GLB,, | Performed by: ORTHOPAEDIC SURGERY

## 2023-01-09 PROCEDURE — 1159F PR MEDICATION LIST DOCUMENTED IN MEDICAL RECORD: ICD-10-PCS | Mod: CPTII,S$GLB,, | Performed by: ORTHOPAEDIC SURGERY

## 2023-01-09 PROCEDURE — 99999 PR PBB SHADOW E&M-EST. PATIENT-LVL II: ICD-10-PCS | Mod: PBBFAC,,, | Performed by: ORTHOPAEDIC SURGERY

## 2023-01-09 PROCEDURE — 20550 NJX 1 TENDON SHEATH/LIGAMENT: CPT | Mod: FA,S$GLB,, | Performed by: ORTHOPAEDIC SURGERY

## 2023-01-09 PROCEDURE — 1159F MED LIST DOCD IN RCRD: CPT | Mod: CPTII,S$GLB,, | Performed by: ORTHOPAEDIC SURGERY

## 2023-01-09 RX ORDER — TRIAMCINOLONE ACETONIDE 40 MG/ML
40 INJECTION, SUSPENSION INTRA-ARTICULAR; INTRAMUSCULAR
Status: DISCONTINUED | OUTPATIENT
Start: 2023-01-09 | End: 2023-01-09 | Stop reason: HOSPADM

## 2023-01-09 RX ADMIN — TRIAMCINOLONE ACETONIDE 40 MG: 40 INJECTION, SUSPENSION INTRA-ARTICULAR; INTRAMUSCULAR at 03:01

## 2023-01-09 NOTE — PROCEDURES
Tendon Sheath    Date/Time: 1/9/2023 3:40 PM  Performed by: Poli Wilkins MD  Authorized by: Poli Wilkins MD     Consent Done?:  Yes (Verbal)  Indications:  Pain  Site marked: the procedure site was marked    Timeout: prior to procedure the correct patient, procedure, and site was verified    Prep: patient was prepped and draped in usual sterile fashion      Local anesthetic:  Lidocaine 1% without epinephrine  Anesthetic total (ml):  0.5    Location:  Thumb  Site:  L thumb flexor tendon sheath  Needle size:  25 G  Medications:  40 mg triamcinolone acetonide 40 mg/mL (20mg injected)  Patient tolerance:  Patient tolerated the procedure well with no immediate complications

## 2023-01-09 NOTE — PROGRESS NOTES
Mr Corey returns to clinic today.  Has a history of bilateral thumb CMC arthritis.  He states that his left thumb is beginning to hurt him again.  States that it is a different location.  He states that his knee pain overlying the palmar side of the thumb.  He is here today for further evaluation     Physical exam: Examination left hand and wrist reveals that there is no edema.  There are no skin changes.  Palpation over the CMC joint produces only very minimal pain.  Does have significant tenderness over the A1 pulley of the thumb.  Flexion and extension of the thumb is also mildly tender and he has a very mild triggering noted.  He does not have any tenderness about the right thumb or the flexor tendon sheath.      Assessment:  Left thumb triggering     Plan:    1.  After informed consent was obtained injection was placed to the left thumb flexor tendon sheath.  The patient tolerated that well.    2.  Will continue to monitor his CMC arthritis bilaterally    3. Will follow up with me

## 2023-03-01 ENCOUNTER — TELEPHONE (OUTPATIENT)
Dept: DERMATOLOGY | Facility: CLINIC | Age: 63
End: 2023-03-01
Payer: COMMERCIAL

## 2023-08-16 ENCOUNTER — PATIENT MESSAGE (OUTPATIENT)
Dept: ADMINISTRATIVE | Facility: HOSPITAL | Age: 63
End: 2023-08-16
Payer: COMMERCIAL

## 2023-08-17 ENCOUNTER — PATIENT OUTREACH (OUTPATIENT)
Dept: ADMINISTRATIVE | Facility: HOSPITAL | Age: 63
End: 2023-08-17
Payer: COMMERCIAL

## 2023-08-17 ENCOUNTER — PATIENT MESSAGE (OUTPATIENT)
Dept: ADMINISTRATIVE | Facility: HOSPITAL | Age: 63
End: 2023-08-17
Payer: COMMERCIAL

## 2023-08-17 DIAGNOSIS — Z12.11 SCREEN FOR COLON CANCER: Primary | ICD-10-CM

## 2023-08-17 NOTE — PROGRESS NOTES
[facility]- Preventative Care ScreeningPopulation Health Chart Review & Patient Outreach Details:     Reason for Outreach Encounter:     [x]  Non-Compliant Report   []  Payor Report ([employer], PHN, BCBS, MSSP, MCIP, C, etc.)   []  Pre-Visit Chart Review     Updates Requested / Reviewed:     []  Care Everywhere    []     []  External Sources ([facility], [facility], DIS, etc.)   []  Care Team Updated    Patient Outreach Method:    []  Telephone Outreach Completed   [] Successful   [] Left Voicemail   [] Unable to Contact (wrong number, no voicemail)  [x]  [facility] Outreach Sent  []  Letter Outreach Mailed  []  Fax Sent for External Records  []  External Records Upload    Health Maintenance Topics Addressed and Outreach Outcomes / Actions Taken:        []      Breast Cancer Screening []  Mammo Scheduled      []  External Records Requested     []  Added Reminder to Complete to Upcoming Primary Care Appt Notes     []  Patient Declined     []  Patient Will Call Back to Schedule     []  Patient Will Schedule with External Provider / Order Routed if Applicable             []       Cervical Cancer Screening []  Pap Scheduled      []  External Records Requested     []  Added Reminder to Complete to Upcoming Primary Care Appt Notes     []  Patient Declined     []  Patient Will Call Back to Schedule     []  Patient Will Schedule with External Provider               [x]          Colorectal Cancer Screening [x]  Colonoscopy Case Request or Referral Placed     []  External Records Requested     []  Added Reminder to Complete to Upcoming Primary Care Appt Notes     []  Patient Declined     []  Patient Will Call Back to Schedule     []  Patient Will Schedule with External Provider     []  Fit Kit Mailed (add the SmartPhrase under additional notes)     []  Reminded Patient to Complete Home Test             []      Diabetic Eye Exam []  Eye Camera Scheduled or Optometry Referral Placed     []  External Records Requested      []  Added Reminder to Complete to Upcoming Primary Care Appt Notes     []  Patient Declined     []  Patient Will Call Back to Schedule     []  Patient Will Schedule with External Provider             []      Blood Pressure Control []  Primary Care Follow Up Visit Scheduled     []  Remote Blood Pressure Reading Captured     []  Added Reminder to Complete to Upcoming Primary Care Appt Notes     []  Patient Declined     []  Patient Will Call Back / Patient Will Send Portal Message with Reading     []  Patient Will Call Back to Schedule Provider Visit             []       HbA1c & Other Labs []  Lab Appt Scheduled for Due Labs     []  Primary Care Follow Up Visit Scheduled      []  Reminded Patient to Complete Home Test     []  Added Reminder to Complete to Upcoming Primary Care Appt Notes     []  Patient Declined     []  Patient Will Call Back to Schedule     []  Patient Will Schedule with External Provider / Order Routed if Applicable           []    Schedule Primary Care Appt []  Primary Care Appt Scheduled     []  Patient Declined     []  Patient Will Call Back to Schedule     []  Pt Established with External Provider & Updated Care Team             []      Medication Adherence []  Primary Care Appointment Scheduled     []  Added Reminder to Upcoming Primary Care Appt Notes     []  Patient Reminded to  Prescription     []  Patient Declined, Provider Notified if Needed     []  Sent Provider Message to Review and/or Add Exclusion to Problem List             []      Osteoporosis Screening []  DXA Appointment Scheduled     []  External Records Requested     []  Added Reminder to Complete to Upcoming Primary Care Appt Notes     []  Patient Declined     []  Patient Will Call Back to Schedule     []  Patient Will Schedule with External Provider / Order Routed if Applicable     Additional Care Coordinator Notes:         Further Action Needed If Patient Returns Outreach:

## 2023-09-12 ENCOUNTER — OFFICE VISIT (OUTPATIENT)
Dept: FAMILY MEDICINE | Facility: CLINIC | Age: 63
End: 2023-09-12
Payer: COMMERCIAL

## 2023-09-12 ENCOUNTER — LAB VISIT (OUTPATIENT)
Dept: LAB | Facility: HOSPITAL | Age: 63
End: 2023-09-12
Attending: FAMILY MEDICINE
Payer: COMMERCIAL

## 2023-09-12 VITALS
SYSTOLIC BLOOD PRESSURE: 116 MMHG | RESPIRATION RATE: 18 BRPM | HEART RATE: 65 BPM | TEMPERATURE: 98 F | HEIGHT: 66 IN | BODY MASS INDEX: 31.25 KG/M2 | WEIGHT: 194.44 LBS | DIASTOLIC BLOOD PRESSURE: 68 MMHG | OXYGEN SATURATION: 95 %

## 2023-09-12 DIAGNOSIS — Z00.00 PREVENTATIVE HEALTH CARE: ICD-10-CM

## 2023-09-12 DIAGNOSIS — Z86.010 PERSONAL HISTORY OF COLONIC POLYPS: ICD-10-CM

## 2023-09-12 DIAGNOSIS — Z00.00 PREVENTATIVE HEALTH CARE: Primary | ICD-10-CM

## 2023-09-12 LAB
ALBUMIN SERPL BCP-MCNC: 3.9 G/DL (ref 3.5–5.2)
ALP SERPL-CCNC: 69 U/L (ref 55–135)
ALT SERPL W/O P-5'-P-CCNC: 18 U/L (ref 10–44)
ANION GAP SERPL CALC-SCNC: 11 MMOL/L (ref 8–16)
AST SERPL-CCNC: 22 U/L (ref 10–40)
BASOPHILS # BLD AUTO: 0.08 K/UL (ref 0–0.2)
BASOPHILS NFR BLD: 1.3 % (ref 0–1.9)
BILIRUB SERPL-MCNC: 0.4 MG/DL (ref 0.1–1)
BUN SERPL-MCNC: 24 MG/DL (ref 8–23)
CALCIUM SERPL-MCNC: 9 MG/DL (ref 8.7–10.5)
CHLORIDE SERPL-SCNC: 106 MMOL/L (ref 95–110)
CHOLEST SERPL-MCNC: 194 MG/DL (ref 120–199)
CHOLEST/HDLC SERPL: 4.4 {RATIO} (ref 2–5)
CO2 SERPL-SCNC: 22 MMOL/L (ref 23–29)
COMPLEXED PSA SERPL-MCNC: 1 NG/ML (ref 0–4)
CREAT SERPL-MCNC: 1.5 MG/DL (ref 0.5–1.4)
DIFFERENTIAL METHOD: NORMAL
EOSINOPHIL # BLD AUTO: 0.4 K/UL (ref 0–0.5)
EOSINOPHIL NFR BLD: 7 % (ref 0–8)
ERYTHROCYTE [DISTWIDTH] IN BLOOD BY AUTOMATED COUNT: 13.2 % (ref 11.5–14.5)
EST. GFR  (NO RACE VARIABLE): 52.3 ML/MIN/1.73 M^2
ESTIMATED AVG GLUCOSE: 108 MG/DL (ref 68–131)
GLUCOSE SERPL-MCNC: 83 MG/DL (ref 70–110)
HBA1C MFR BLD: 5.4 % (ref 4–5.6)
HCT VFR BLD AUTO: 44.8 % (ref 40–54)
HDLC SERPL-MCNC: 44 MG/DL (ref 40–75)
HDLC SERPL: 22.7 % (ref 20–50)
HGB BLD-MCNC: 14.7 G/DL (ref 14–18)
IMM GRANULOCYTES # BLD AUTO: 0.02 K/UL (ref 0–0.04)
IMM GRANULOCYTES NFR BLD AUTO: 0.3 % (ref 0–0.5)
LDLC SERPL CALC-MCNC: 125.2 MG/DL (ref 63–159)
LYMPHOCYTES # BLD AUTO: 1.6 K/UL (ref 1–4.8)
LYMPHOCYTES NFR BLD: 26.2 % (ref 18–48)
MCH RBC QN AUTO: 30.1 PG (ref 27–31)
MCHC RBC AUTO-ENTMCNC: 32.8 G/DL (ref 32–36)
MCV RBC AUTO: 92 FL (ref 82–98)
MONOCYTES # BLD AUTO: 0.7 K/UL (ref 0.3–1)
MONOCYTES NFR BLD: 10.9 % (ref 4–15)
NEUTROPHILS # BLD AUTO: 3.4 K/UL (ref 1.8–7.7)
NEUTROPHILS NFR BLD: 54.3 % (ref 38–73)
NONHDLC SERPL-MCNC: 150 MG/DL
NRBC BLD-RTO: 0 /100 WBC
PLATELET # BLD AUTO: 183 K/UL (ref 150–450)
PMV BLD AUTO: 10.7 FL (ref 9.2–12.9)
POTASSIUM SERPL-SCNC: 4.1 MMOL/L (ref 3.5–5.1)
PROT SERPL-MCNC: 7 G/DL (ref 6–8.4)
RBC # BLD AUTO: 4.88 M/UL (ref 4.6–6.2)
SODIUM SERPL-SCNC: 139 MMOL/L (ref 136–145)
TRIGL SERPL-MCNC: 124 MG/DL (ref 30–150)
WBC # BLD AUTO: 6.26 K/UL (ref 3.9–12.7)

## 2023-09-12 PROCEDURE — 3044F PR MOST RECENT HEMOGLOBIN A1C LEVEL <7.0%: ICD-10-PCS | Mod: CPTII,S$GLB,, | Performed by: FAMILY MEDICINE

## 2023-09-12 PROCEDURE — 3008F BODY MASS INDEX DOCD: CPT | Mod: CPTII,S$GLB,, | Performed by: FAMILY MEDICINE

## 2023-09-12 PROCEDURE — 3074F SYST BP LT 130 MM HG: CPT | Mod: CPTII,S$GLB,, | Performed by: FAMILY MEDICINE

## 2023-09-12 PROCEDURE — 80053 COMPREHEN METABOLIC PANEL: CPT | Performed by: FAMILY MEDICINE

## 2023-09-12 PROCEDURE — 3008F PR BODY MASS INDEX (BMI) DOCUMENTED: ICD-10-PCS | Mod: CPTII,S$GLB,, | Performed by: FAMILY MEDICINE

## 2023-09-12 PROCEDURE — 99999 PR PBB SHADOW E&M-EST. PATIENT-LVL III: CPT | Mod: PBBFAC,,, | Performed by: FAMILY MEDICINE

## 2023-09-12 PROCEDURE — 99396 PREV VISIT EST AGE 40-64: CPT | Mod: S$GLB,,, | Performed by: FAMILY MEDICINE

## 2023-09-12 PROCEDURE — 80061 LIPID PANEL: CPT | Performed by: FAMILY MEDICINE

## 2023-09-12 PROCEDURE — 3074F PR MOST RECENT SYSTOLIC BLOOD PRESSURE < 130 MM HG: ICD-10-PCS | Mod: CPTII,S$GLB,, | Performed by: FAMILY MEDICINE

## 2023-09-12 PROCEDURE — 36415 COLL VENOUS BLD VENIPUNCTURE: CPT | Mod: PO | Performed by: FAMILY MEDICINE

## 2023-09-12 PROCEDURE — 1159F PR MEDICATION LIST DOCUMENTED IN MEDICAL RECORD: ICD-10-PCS | Mod: CPTII,S$GLB,, | Performed by: FAMILY MEDICINE

## 2023-09-12 PROCEDURE — 84153 ASSAY OF PSA TOTAL: CPT | Performed by: FAMILY MEDICINE

## 2023-09-12 PROCEDURE — 99396 PR PREVENTIVE VISIT,EST,40-64: ICD-10-PCS | Mod: S$GLB,,, | Performed by: FAMILY MEDICINE

## 2023-09-12 PROCEDURE — 3078F DIAST BP <80 MM HG: CPT | Mod: CPTII,S$GLB,, | Performed by: FAMILY MEDICINE

## 2023-09-12 PROCEDURE — 83036 HEMOGLOBIN GLYCOSYLATED A1C: CPT | Performed by: FAMILY MEDICINE

## 2023-09-12 PROCEDURE — 1159F MED LIST DOCD IN RCRD: CPT | Mod: CPTII,S$GLB,, | Performed by: FAMILY MEDICINE

## 2023-09-12 PROCEDURE — 3078F PR MOST RECENT DIASTOLIC BLOOD PRESSURE < 80 MM HG: ICD-10-PCS | Mod: CPTII,S$GLB,, | Performed by: FAMILY MEDICINE

## 2023-09-12 PROCEDURE — 3044F HG A1C LEVEL LT 7.0%: CPT | Mod: CPTII,S$GLB,, | Performed by: FAMILY MEDICINE

## 2023-09-12 PROCEDURE — 85025 COMPLETE CBC W/AUTO DIFF WBC: CPT | Performed by: FAMILY MEDICINE

## 2023-09-12 PROCEDURE — 99999 PR PBB SHADOW E&M-EST. PATIENT-LVL III: ICD-10-PCS | Mod: PBBFAC,,, | Performed by: FAMILY MEDICINE

## 2023-09-12 NOTE — PROGRESS NOTES
Subjective:       Patient ID: Efra Corey is a 63 y.o. male.    Chief Complaint: Preventative Healtl Care (Physical)      Patient presents with:  Preventative Health Care: Physical, fasting  Dizziness      Overall feeling well    Past Medical History:    Colon polyp                                              Past Surgical History:    KNEE ARTHROSCOPY W/ ACL RECONSTRUCTION                       Comment:left; Dr. Rock Alanis    MOUTH SURGERY                                                  No Known Allergies    Social History    Marital Status: Single              Spouse Name:                       Years of Education:                 Number of children: 2             Occupational History  Occupation          Employer            Comment               [employer]                         Social History Main Topics    Smoking Status: Never Smoker                      Smokeless Status: Never Used                        Alcohol Use: Yes                Comment: social    Drug Use: No              Sexual Activity: Not on file          Other Topics            Concern    None on file    Social History Narrative    : wife  of breast cancer      Exercise: regular      From [place], [place]    Current Outpatient Medications on File Prior to Visit:  acyclovir (ZOVIRAX) 400 MG tablet, TAKE 1 TABLET BY MOUTH TWICE A DAY (Patient not taking: Reported on 2022), Disp: 180 tablet, Rfl: 2  fluorouraciL (EFUDEX) 5 % cream, AAA bid x 2 weeks (Patient not taking: Reported on 2022), Disp: 40 g, Rfl: 1  gabapentin (NEURONTIN) 300 MG capsule, Take 1 capsule (300 mg total) by mouth every evening., Disp: 30 capsule, Rfl: 11  oxyCODONE-acetaminophen (PERCOCET) 5-325 mg per tablet, Take 1 tablet by mouth every 6 (six) hours as needed for Pain., Disp: 15 tablet, Rfl: 0  sildenafil (REVATIO) 20 mg Tab, Take 1 tablet (20 mg total) by mouth 3 (three) times daily., Disp: 90 tablet, Rfl: 3    No current  "facility-administered medications on file prior to visit.                    Review of Systems   Constitutional:  Negative for appetite change, chills, fever and unexpected weight change.   HENT:  Negative for sore throat and trouble swallowing.    Eyes:  Negative for pain and visual disturbance.   Respiratory:  Negative for cough, chest tightness, shortness of breath and wheezing.    Cardiovascular:  Negative for chest pain, palpitations and leg swelling.   Gastrointestinal:  Negative for abdominal pain, blood in stool, constipation, diarrhea and nausea.   Genitourinary:  Negative for difficulty urinating, dysuria and hematuria.   Musculoskeletal:  Positive for arthralgias (shoulders and knees), neck pain and neck stiffness. Negative for gait problem.   Skin:  Negative for rash and wound.   Neurological:  Negative for dizziness, weakness, light-headedness and headaches.   Hematological:  Negative for adenopathy.   Psychiatric/Behavioral:  Negative for dysphoric mood.        Objective:       /68   Pulse 65   Temp 98 °F (36.7 °C) (Oral)   Resp 18   Ht 5' 6" (1.676 m)   Wt 88.2 kg (194 lb 7.1 oz)   SpO2 95%   BMI 31.38 kg/m²     Physical Exam  Constitutional:       General: He is not in acute distress.     Appearance: He is well-developed.   HENT:      Head: Normocephalic and atraumatic.      Right Ear: External ear normal.      Left Ear: External ear normal.      Mouth/Throat:      Pharynx: Uvula midline. No oropharyngeal exudate.   Eyes:      General: Lids are normal.      Conjunctiva/sclera: Conjunctivae normal.      Pupils: Pupils are equal, round, and reactive to light.   Neck:      Thyroid: No thyroid mass or thyromegaly.      Trachea: Phonation normal.   Cardiovascular:      Rate and Rhythm: Normal rate and regular rhythm.      Heart sounds: Normal heart sounds. No murmur heard.     No friction rub. No gallop.   Pulmonary:      Effort: Pulmonary effort is normal. No respiratory distress.      " Breath sounds: Normal breath sounds. No wheezing or rales.   Abdominal:      General: Bowel sounds are normal. There is no distension.      Palpations: Abdomen is soft. There is no mass.      Tenderness: There is no abdominal tenderness. There is no guarding or rebound.   Musculoskeletal:      Right shoulder: Crepitus present. No tenderness or bony tenderness. Decreased range of motion. Normal strength.      Left shoulder: Crepitus present. No tenderness or bony tenderness. Decreased range of motion. Normal strength.      Cervical back: Full passive range of motion without pain, normal range of motion and neck supple. No spasms.      Right knee: Normal range of motion. Abnormal patellar mobility (some crepitus).      Left knee: Decreased range of motion (limited extension). Tenderness present over the medial joint line and lateral joint line.   Lymphadenopathy:      Cervical: No cervical adenopathy.   Skin:     General: Skin is warm and dry.   Neurological:      Mental Status: He is alert and oriented to person, place, and time.      Cranial Nerves: No cranial nerve deficit.      Coordination: Coordination normal.   Psychiatric:         Speech: Speech normal.         Behavior: Behavior normal.         Thought Content: Thought content normal.         Judgment: Judgment normal.           Results for orders placed or performed in visit on 09/27/22   CBC Auto Differential   Result Value Ref Range    WBC 7.29 3.90 - 12.70 K/uL    RBC 5.45 4.60 - 6.20 M/uL    Hemoglobin 16.6 14.0 - 18.0 g/dL    Hematocrit 49.2 40.0 - 54.0 %    MCV 90 82 - 98 fL    MCH 30.5 27.0 - 31.0 pg    MCHC 33.7 32.0 - 36.0 g/dL    RDW 13.2 11.5 - 14.5 %    Platelets 192 150 - 450 K/uL    MPV 11.0 9.2 - 12.9 fL    Immature Granulocytes 0.4 0.0 - 0.5 %    Gran # (ANC) 4.6 1.8 - 7.7 K/uL    Immature Grans (Abs) 0.03 0.00 - 0.04 K/uL    Lymph # 1.6 1.0 - 4.8 K/uL    Mono # 0.6 0.3 - 1.0 K/uL    Eos # 0.5 0.0 - 0.5 K/uL    Baso # 0.08 0.00 - 0.20 K/uL     nRBC 0 0 /100 WBC    Gran % 62.6 38.0 - 73.0 %    Lymph % 21.9 18.0 - 48.0 %    Mono % 7.8 4.0 - 15.0 %    Eosinophil % 6.2 0.0 - 8.0 %    Basophil % 1.1 0.0 - 1.9 %    Differential Method Automated    Comprehensive Metabolic Panel   Result Value Ref Range    Sodium 140 136 - 145 mmol/L    Potassium 4.6 3.5 - 5.1 mmol/L    Chloride 106 95 - 110 mmol/L    CO2 24 23 - 29 mmol/L    Glucose 89 70 - 110 mg/dL    BUN 16 8 - 23 mg/dL    Creatinine 1.2 0.5 - 1.4 mg/dL    Calcium 9.5 8.7 - 10.5 mg/dL    Total Protein 7.6 6.0 - 8.4 g/dL    Albumin 4.3 3.5 - 5.2 g/dL    Total Bilirubin 0.8 0.1 - 1.0 mg/dL    Alkaline Phosphatase 79 55 - 135 U/L    AST 21 10 - 40 U/L    ALT 17 10 - 44 U/L    Anion Gap 10 8 - 16 mmol/L    eGFR >60.0 >60 mL/min/1.73 m^2   Lipid Panel   Result Value Ref Range    Cholesterol 209 (H) 120 - 199 mg/dL    Triglycerides 109 30 - 150 mg/dL    HDL 50 40 - 75 mg/dL    LDL Cholesterol 137.2 63.0 - 159.0 mg/dL    HDL/Cholesterol Ratio 23.9 20.0 - 50.0 %    Total Cholesterol/HDL Ratio 4.2 2.0 - 5.0    Non-HDL Cholesterol 159 mg/dL   PSA, Screening   Result Value Ref Range    PSA, Screen 0.88 0.00 - 4.00 ng/mL   TSH   Result Value Ref Range    TSH 2.824 0.400 - 4.000 uIU/mL       Assessment:       1. Preventative health care    2. Personal history of colonic polyps        Plan:       Preventative health care  -     CBC Auto Differential; Future; Expected date: 09/12/2023  -     Comprehensive Metabolic Panel; Future; Expected date: 09/12/2023  -     Lipid Panel; Future; Expected date: 09/12/2023  -     Hemoglobin A1C; Future; Expected date: 09/12/2023  -     PSA, Screening; Future; Expected date: 09/12/2023    Personal history of colonic polyps  -     Case Request Endoscopy: COLONOSCOPY      Labs soon  Counseled on regular exercise, maintenance of a healthy weight, balanced diet rich in fruits/vegetables and lean protein, and avoidance of unhealthy habits like smoking and excessive alcohol intake.

## 2023-09-14 ENCOUNTER — TELEPHONE (OUTPATIENT)
Dept: GASTROENTEROLOGY | Facility: CLINIC | Age: 63
End: 2023-09-14
Payer: COMMERCIAL

## 2023-09-14 NOTE — TELEPHONE ENCOUNTER
[employer] message sent. Awaiting patient's responses. If pt doesn't reach out, will attempt a second time. If patient doesn't answer phone call, will cancel case and send cancellation letter to patient.

## 2023-10-26 ENCOUNTER — PATIENT MESSAGE (OUTPATIENT)
Dept: FAMILY MEDICINE | Facility: CLINIC | Age: 63
End: 2023-10-26
Payer: COMMERCIAL

## 2023-12-20 ENCOUNTER — HOSPITAL ENCOUNTER (OUTPATIENT)
Facility: HOSPITAL | Age: 63
Discharge: HOME | End: 2023-12-20
Attending: INTERNAL MEDICINE | Admitting: INTERNAL MEDICINE
Payer: COMMERCIAL

## 2023-12-20 ENCOUNTER — ANESTHESIA (OUTPATIENT)
Dept: ENDOSCOPY | Facility: HOSPITAL | Age: 63
End: 2023-12-20
Payer: COMMERCIAL

## 2023-12-20 ENCOUNTER — ANESTHESIA EVENT (OUTPATIENT)
Dept: ENDOSCOPY | Facility: HOSPITAL | Age: 63
End: 2023-12-20
Payer: COMMERCIAL

## 2023-12-20 VITALS
HEIGHT: 66 IN | DIASTOLIC BLOOD PRESSURE: 85 MMHG | BODY MASS INDEX: 31.34 KG/M2 | SYSTOLIC BLOOD PRESSURE: 139 MMHG | TEMPERATURE: 98 F | WEIGHT: 195 LBS | RESPIRATION RATE: 16 BRPM | HEART RATE: 65 BPM | OXYGEN SATURATION: 97 %

## 2023-12-20 DIAGNOSIS — Z86.010 HX OF COLONIC POLYPS: ICD-10-CM

## 2023-12-20 PROCEDURE — D9220A PRA ANESTHESIA: Mod: 33,ANES,, | Performed by: ANESTHESIOLOGY

## 2023-12-20 PROCEDURE — 63600175 PHARM REV CODE 636 W HCPCS: Mod: PO | Performed by: NURSE ANESTHETIST, CERTIFIED REGISTERED

## 2023-12-20 PROCEDURE — 25000003 PHARM REV CODE 250: Mod: PO | Performed by: NURSE ANESTHETIST, CERTIFIED REGISTERED

## 2023-12-20 PROCEDURE — 37000009 HC ANESTHESIA EA ADD 15 MINS: Mod: PO | Performed by: INTERNAL MEDICINE

## 2023-12-20 PROCEDURE — D9220A PRA ANESTHESIA: ICD-10-PCS | Mod: 33,CRNA,, | Performed by: NURSE ANESTHETIST, CERTIFIED REGISTERED

## 2023-12-20 PROCEDURE — 37000008 HC ANESTHESIA 1ST 15 MINUTES: Mod: PO | Performed by: INTERNAL MEDICINE

## 2023-12-20 PROCEDURE — 45385 COLONOSCOPY W/LESION REMOVAL: CPT | Mod: 33,,, | Performed by: INTERNAL MEDICINE

## 2023-12-20 PROCEDURE — D9220A PRA ANESTHESIA: ICD-10-PCS | Mod: 33,ANES,, | Performed by: ANESTHESIOLOGY

## 2023-12-20 PROCEDURE — 45385 COLONOSCOPY W/LESION REMOVAL: CPT | Mod: PT,PO | Performed by: INTERNAL MEDICINE

## 2023-12-20 PROCEDURE — 88305 TISSUE EXAM BY PATHOLOGIST: ICD-10-PCS | Mod: 26,,, | Performed by: PATHOLOGY

## 2023-12-20 PROCEDURE — 88305 TISSUE EXAM BY PATHOLOGIST: CPT | Mod: 26,,, | Performed by: PATHOLOGY

## 2023-12-20 PROCEDURE — 45385 PR COLONOSCOPY,REMV LESN,SNARE: ICD-10-PCS | Mod: 33,,, | Performed by: INTERNAL MEDICINE

## 2023-12-20 PROCEDURE — 63600175 PHARM REV CODE 636 W HCPCS: Mod: PO | Performed by: INTERNAL MEDICINE

## 2023-12-20 PROCEDURE — 27201089 HC SNARE, DISP (ANY): Mod: PO | Performed by: INTERNAL MEDICINE

## 2023-12-20 PROCEDURE — D9220A PRA ANESTHESIA: Mod: 33,CRNA,, | Performed by: NURSE ANESTHETIST, CERTIFIED REGISTERED

## 2023-12-20 PROCEDURE — 88305 TISSUE EXAM BY PATHOLOGIST: CPT | Mod: PO | Performed by: PATHOLOGY

## 2023-12-20 RX ORDER — SODIUM CHLORIDE 0.9 % (FLUSH) 0.9 %
10 SYRINGE (ML) INJECTION
Status: DISCONTINUED | OUTPATIENT
Start: 2023-12-20 | End: 2023-12-20 | Stop reason: HOSPADM

## 2023-12-20 RX ORDER — SODIUM CHLORIDE, SODIUM LACTATE, POTASSIUM CHLORIDE, CALCIUM CHLORIDE 600; 310; 30; 20 MG/100ML; MG/100ML; MG/100ML; MG/100ML
INJECTION, SOLUTION INTRAVENOUS CONTINUOUS
Status: DISCONTINUED | OUTPATIENT
Start: 2023-12-20 | End: 2023-12-20 | Stop reason: HOSPADM

## 2023-12-20 RX ORDER — LIDOCAINE HYDROCHLORIDE 20 MG/ML
INJECTION INTRAVENOUS
Status: DISCONTINUED | OUTPATIENT
Start: 2023-12-20 | End: 2023-12-20

## 2023-12-20 RX ORDER — PROPOFOL 10 MG/ML
VIAL (ML) INTRAVENOUS
Status: DISCONTINUED | OUTPATIENT
Start: 2023-12-20 | End: 2023-12-20

## 2023-12-20 RX ADMIN — LIDOCAINE HYDROCHLORIDE 100 MG: 20 INJECTION INTRAVENOUS at 09:12

## 2023-12-20 RX ADMIN — PROPOFOL 25 MG: 10 INJECTION, EMULSION INTRAVENOUS at 09:12

## 2023-12-20 RX ADMIN — SODIUM CHLORIDE, SODIUM LACTATE, POTASSIUM CHLORIDE, AND CALCIUM CHLORIDE: .6; .31; .03; .02 INJECTION, SOLUTION INTRAVENOUS at 08:12

## 2023-12-20 RX ADMIN — PROPOFOL 50 MG: 10 INJECTION, EMULSION INTRAVENOUS at 09:12

## 2023-12-20 RX ADMIN — PROPOFOL 150 MG: 10 INJECTION, EMULSION INTRAVENOUS at 09:12

## 2023-12-20 NOTE — ANESTHESIA POSTPROCEDURE EVALUATION
Anesthesia Post Evaluation    Patient: Efra Corey    Procedure(s) Performed: Procedure(s) (LRB):  COLONOSCOPY (N/A)    Final Anesthesia Type: general      Patient location during evaluation: PACU  Patient participation: Yes- Able to Participate  Level of consciousness: awake and alert  Post-procedure vital signs: reviewed and stable  Pain management: adequate  Airway patency: patent    PONV status at discharge: No PONV  Anesthetic complications: no      Cardiovascular status: hemodynamically stable  Respiratory status: unassisted and room air  Hydration status: euvolemic  Follow-up not needed.              Vitals Value Taken Time   /85 12/20/23 1004   Temp 36.7 °C (98 °F) 12/20/23 0938   Pulse 65 12/20/23 1004   Resp 16 12/20/23 1004   SpO2 97 % 12/20/23 1004         Event Time   Out of Recovery 10:10:55         Pain/Urban Score: Urban Score: 10 (12/20/2023 10:02 AM)

## 2023-12-20 NOTE — DISCHARGE SUMMARY
[facility] - Endoscopy  Discharge Note  Short Stay  Discharge Note  Short Stay      SUMMARY     Admit Date: 12/20/2023    Attending Physician: Efra Paul MD     Discharge Physician: Efra Paul MD    Discharge Date: 12/20/2023 9:37 AM    Final Diagnosis: Screening [Z13.9]    Disposition: HOME OR SELF CARE    Patient Instructions:   Current Discharge Medication List        CONTINUE these medications which have NOT CHANGED    Details   acyclovir (ZOVIRAX) 400 MG tablet TAKE 1 TABLET BY MOUTH TWICE A DAY  Qty: 180 tablet, Refills: 2      fluorouraciL (EFUDEX) 5 % cream AAA bid x 2 weeks  Qty: 40 g, Refills: 1    Associated Diagnoses: Actinic keratosis      gabapentin (NEURONTIN) 300 MG capsule Take 1 capsule (300 mg total) by mouth every evening.  Qty: 30 capsule, Refills: 11    Associated Diagnoses: Osteoarthritis of spine with radiculopathy, cervical region; Radiculopathy, cervical region      sildenafil (REVATIO) 20 mg Tab Take 1 tablet (20 mg total) by mouth 3 (three) times daily.  Qty: 90 tablet, Refills: 3    Associated Diagnoses: Erectile dysfunction, unspecified erectile dysfunction type      varicella-zoster gE-AS01B, PF, (SHINGRIX) 50 mcg/0.5 mL injection Inject into the muscle.  Qty: 1 each, Refills: 1             Discharge Procedure Orders (must include Diet, Follow-up, Activity)    Follow Up:  Follow up with PCP as previously scheduled  Resume routine diet.  Activity as tolerated.    No driving day of procedure.

## 2023-12-20 NOTE — H&P
History & Physical - Short Stay  Gastroenterology      SUBJECTIVE:     Procedure: Colonoscopy    Chief Complaint/Indication for Procedure: Previous Polyps    PTA Medications   Medication Sig    acyclovir (ZOVIRAX) 400 MG tablet TAKE 1 TABLET BY MOUTH TWICE A DAY (Patient not taking: Reported on 9/27/2022)    fluorouraciL (EFUDEX) 5 % cream AAA bid x 2 weeks (Patient not taking: Reported on 9/27/2022)    gabapentin (NEURONTIN) 300 MG capsule Take 1 capsule (300 mg total) by mouth every evening. (Patient not taking: Reported on 9/12/2023)    sildenafil (REVATIO) 20 mg Tab Take 1 tablet (20 mg total) by mouth 3 (three) times daily.    varicella-zoster gE-AS01B, PF, (SHINGRIX) 50 mcg/0.5 mL injection Inject into the muscle.       Review of patient's allergies indicates:  No Known Allergies     Past Medical History:   Diagnosis Date    Basal cell carcinoma 2018    R neck, R nasal dorsum, L ala    Colon polyp     Diverticulosis     DJD (degenerative joint disease) of knee      Past Surgical History:   Procedure Laterality Date    COLONOSCOPY      COLONOSCOPY N/A 11/5/2018    Procedure: COLONOSCOPY;  Surgeon: Efra Paul MD;  Location: [facility] ENDO;  Service: Endoscopy;  Laterality: N/A; 1 colon polyp removed, repeat in 5 years for surveillance; biopsy: Sessile serrated polyp    FUSION OF METATARSOPHALANGEAL JOINT Right 7/6/2021    Procedure: FUSION, MTP JOINT;  Surgeon: Lb Schrader DPM;  Location: [facility] OR;  Service: Podiatry;  Laterality: Right;    KNEE ARTHROSCOPY W/ ACL RECONSTRUCTION  2006    left; Dr. Rock Alanis    MOUTH SURGERY      SKIN CANCER EXCISION       Family History   Problem Relation Age of Onset    Colon cancer Neg Hx     Crohn's disease Neg Hx     Ulcerative colitis Neg Hx     Celiac disease Neg Hx      Social History     Tobacco Use    Smoking status: Never    Smokeless tobacco: Never   Substance Use Topics    Alcohol use: Yes     Alcohol/week: 1.0 standard drink of alcohol     Types: 1 Cans  of beer per week     Comment: social    Drug use: No         OBJECTIVE:     Vital Signs (Most Recent)  Temp: 97.1 °F (36.2 °C) (12/20/23 0824)  Pulse: 63 (12/20/23 0824)  Resp: 16 (12/20/23 0824)  BP: (!) 163/98 (12/20/23 0824)  SpO2: 99 % (12/20/23 0824)    Physical Exam:                                                       GENERAL:  Comfortable, in no acute distress.                                 HEENT EXAM:  Nonicteric.  No adenopathy.  Oropharynx is clear.               NECK:  Supple.                                                               LUNGS:  Clear.                                                               CARDIAC:  Regular rate and rhythm.  S1, S2.  No murmur.                      ABDOMEN:  Soft, positive bowel sounds, nontender.  No hepatosplenomegaly or masses.  No rebound or guarding.                                             EXTREMITIES:  No edema.     MENTAL STATUS:  Normal, alert and oriented.      ASSESSMENT/PLAN:     Assessment: Previous Polyps    Plan: Colonoscopy    Anesthesia Plan: General    ASA Grade: ASA 2 - Patient with mild systemic disease with no functional limitations    MALLAMPATI SCORE:  I (soft palate, uvula, fauces, and tonsillar pillars visible)

## 2023-12-20 NOTE — PROVATION PATIENT INSTRUCTIONS
Discharge Summary/Instructions after an Endoscopic Procedure  Patient Name: Efra Corey  Patient MRN: [id number]  Patient YOB: 1960  Wednesday, December 20, 2023  Efra Paul MD  Dear patient,  As a result of recent federal legislation (The Federal Cures Act), you may   receive lab or pathology results from your procedure in your [facility]   account before your physician is able to contact you. Your physician or   their representative will relay the results to you with their   recommendations at their soonest availability.  Thank you,  RESTRICTIONS:  During your procedure today, you received medications for sedation.  These   medications may affect your judgment, balance and coordination.  Therefore,   for 24 hours, you have the following restrictions:   - DO NOT drive a car, operate machinery, make legal/financial decisions,   sign important papers or drink alcohol.    ACTIVITY:  Today: no heavy lifting, straining or running due to procedural   sedation/anesthesia.  The following day: return to full activity including work.  DIET:  Eat and drink normally unless instructed otherwise.     TREATMENT FOR COMMON SIDE EFFECTS:  - Mild abdominal pain, nausea, belching, bloating or excessive gas:  rest,   eat lightly and use a heating pad.  - Sore Throat: treat with throat lozenges and/or gargle with warm salt   water.  - Because air was used during the procedure, expelling large amounts of air   from your rectum or belching is normal.  - If a bowel prep was taken, you may not have a bowel movement for 1-3 days.    This is normal.  SYMPTOMS TO WATCH FOR AND REPORT TO YOUR PHYSICIAN:  1. Abdominal pain or bloating, other than gas cramps.  2. Chest pain.  3. Back pain.  4. Signs of infection such as: chills or fever occurring within 24 hours   after the procedure.  5. Rectal bleeding, which would show as bright red, maroon, or black stools.   (A tablespoon of blood from the rectum is not serious,  especially if   hemorrhoids are present.)  6. Vomiting.  7. Weakness or dizziness.  GO DIRECTLY TO THE NEAREST EMERGENCY ROOM IF YOU HAVE ANY OF THE FOLLOWING:      Difficulty breathing              Chills and/or fever over 101 F   Persistent vomiting and/or vomiting blood   Severe abdominal pain   Severe chest pain   Black, tarry stools   Bleeding- more than one tablespoon   Any other symptom or condition that you feel may need urgent attention  Your doctor recommends these additional instructions:  If any biopsies were taken, your doctors clinic will contact you in 1 to 2   weeks with any results.  We are waiting for your pathology results.   Your physician has recommended a repeat colonoscopy in five years for   surveillance based on pathology results.   You are being discharged to home.  For questions, problems or results please call your physician - Efra Paul MD at Work:  [phone].  EMERGENCY PHONE NUMBER: [phone], LAB RESULTS: [phone]  IF A COMPLICATION OR EMERGENCY SITUATION ARISES AND YOU ARE UNABLE TO REACH   YOUR PHYSICIAN - GO DIRECTLY TO THE EMERGENCY ROOM.  ___________________________________________  Nurse Signature  ___________________________________________  Patient/Designated Responsible Party Signature  Efra Paul MD  12/20/2023 9:36:51 AM  This report has been verified and signed electronically.  Dear patient,  As a result of recent federal legislation (The Federal Cures Act), you may   receive lab or pathology results from your procedure in your [facility]   account before your physician is able to contact you. Your physician or   their representative will relay the results to you with their   recommendations at their soonest availability.  Thank you.  PROVATION

## 2023-12-20 NOTE — TRANSFER OF CARE
"Anesthesia Transfer of Care Note    Patient: Efra Corey    Procedure(s) Performed: Procedure(s) (LRB):  COLONOSCOPY (N/A)    Patient location: PACU    Anesthesia Type: general    Transport from OR: Transported from OR on room air with adequate spontaneous ventilation    Post pain: adequate analgesia    Post assessment: no apparent anesthetic complications and tolerated procedure well    Post vital signs: stable    Level of consciousness: sedated and awake    Nausea/Vomiting: no nausea/vomiting    Complications: none    Transfer of care protocol was followed      Last vitals: Visit Vitals  /70 (BP Location: Left arm, Patient Position: Lying)   Pulse (!) 58   Temp 36.7 °C (98 °F) (Temporal)   Resp 11   Ht 5' 6" (1.676 m)   Wt 88.5 kg (195 lb)   SpO2 95%   BMI 31.47 kg/m²     "

## 2023-12-20 NOTE — ANESTHESIA PREPROCEDURE EVALUATION
12/20/2023  Efra Corey is a 63 y.o., male.      Pre-op Assessment    I have reviewed the Patient Summary Reports.     I have reviewed the Nursing Notes. I have reviewed the NPO Status.   I have reviewed the Medications.     Review of Systems  Anesthesia Hx:  No problems with previous Anesthesia                Social:  Non-Smoker       Hematology/Oncology:  Hematology Normal   Oncology Normal                                   EENT/Dental:  EENT/Dental Normal           Cardiovascular:  Cardiovascular Normal                                            Pulmonary:  Pulmonary Normal                       Renal/:  Renal/ Normal                 Hepatic/GI:  Bowel Prep.                Musculoskeletal:  Arthritis        Arthritis          Neurological:  Neurology Normal              Arthritis                           Endocrine:        Obesity / BMI > 30  Dermatological:  Skin Normal    Psych:  Psychiatric Normal                    Physical Exam  General: Well nourished, Cooperative, Alert and Oriented    Airway:  Mallampati: II   Mouth Opening: Normal  TM Distance: Normal    Dental:  Intact    Chest/Lungs:  Normal Respiratory Rate    Heart:  Rate: Normal        Anesthesia Plan  Type of Anesthesia, risks & benefits discussed:    Anesthesia Type: Gen Natural Airway  Intra-op Monitoring Plan: Standard ASA Monitors  Induction:  IV  Informed Consent: Informed consent signed with the Patient and all parties understand the risks and agree with anesthesia plan.  All questions answered.   ASA Score: 2    Ready For Surgery From Anesthesia Perspective.     .

## 2023-12-26 LAB
FINAL PATHOLOGIC DIAGNOSIS: NORMAL
GROSS: NORMAL
Lab: NORMAL

## 2023-12-30 ENCOUNTER — PATIENT MESSAGE (OUTPATIENT)
Dept: FAMILY MEDICINE | Facility: CLINIC | Age: 63
End: 2023-12-30
Payer: COMMERCIAL

## 2023-12-30 DIAGNOSIS — N52.9 ERECTILE DYSFUNCTION, UNSPECIFIED ERECTILE DYSFUNCTION TYPE: ICD-10-CM

## 2023-12-30 NOTE — TELEPHONE ENCOUNTER
No care due was identified.  [facility] Embedded Care Due Messages. Reference number: [id number].   12/30/2023 1:03:17 PM CST

## 2023-12-31 RX ORDER — SILDENAFIL CITRATE 20 MG/1
20 TABLET ORAL 3 TIMES DAILY
Qty: 90 TABLET | Refills: 3 | Status: SHIPPED | OUTPATIENT
Start: 2023-12-31 | End: 2024-01-02 | Stop reason: SDUPTHER

## 2024-01-01 NOTE — TELEPHONE ENCOUNTER
Refill Decision Note   Efra Corey  is requesting a refill authorization.  Brief Assessment and Rationale for Refill:  Approve     Medication Therapy Plan:         Comments:     Note composed:11:13 PM 12/31/2023

## 2024-01-02 DIAGNOSIS — N52.9 ERECTILE DYSFUNCTION, UNSPECIFIED ERECTILE DYSFUNCTION TYPE: ICD-10-CM

## 2024-01-02 RX ORDER — SILDENAFIL CITRATE 20 MG/1
20 TABLET ORAL 3 TIMES DAILY
Qty: 90 TABLET | Refills: 3 | Status: SHIPPED | OUTPATIENT
Start: 2024-01-02

## 2024-01-02 NOTE — TELEPHONE ENCOUNTER
No care due was identified.  Health [facility] Embedded Care Due Messages. Reference number: [id number].   1/02/2024 12:40:19 PM CST

## 2024-05-15 ENCOUNTER — OFFICE VISIT (OUTPATIENT)
Dept: ORTHOPEDICS | Facility: CLINIC | Age: 64
End: 2024-05-15
Payer: COMMERCIAL

## 2024-05-15 DIAGNOSIS — G56.03 CARPAL TUNNEL SYNDROME, BILATERAL: Primary | ICD-10-CM

## 2024-05-15 PROCEDURE — 99213 OFFICE O/P EST LOW 20 MIN: CPT | Mod: 25,S$GLB,, | Performed by: ORTHOPAEDIC SURGERY

## 2024-05-15 PROCEDURE — 99999 PR PBB SHADOW E&M-EST. PATIENT-LVL II: CPT | Mod: PBBFAC,,, | Performed by: ORTHOPAEDIC SURGERY

## 2024-05-15 PROCEDURE — 20526 THER INJECTION CARP TUNNEL: CPT | Mod: 50,S$GLB,, | Performed by: ORTHOPAEDIC SURGERY

## 2024-05-15 RX ORDER — TRIAMCINOLONE ACETONIDE 40 MG/ML
40 INJECTION, SUSPENSION INTRA-ARTICULAR; INTRAMUSCULAR
Status: DISCONTINUED | OUTPATIENT
Start: 2024-05-15 | End: 2024-05-15 | Stop reason: HOSPADM

## 2024-05-15 RX ADMIN — TRIAMCINOLONE ACETONIDE 40 MG: 40 INJECTION, SUSPENSION INTRA-ARTICULAR; INTRAMUSCULAR at 10:05

## 2024-05-15 NOTE — PROGRESS NOTES
Mr Corey returns to clinic today.  Has a history of bilateral hand numbness and tingling.  He also has pain at the bases of both thumbs.  He was injected once in the past for a trigger thumb but that is no longer an issue.      Physical exam: Examination of bilateral hands reveals that there is no edema.  There are no skin changes.  Does report mild sensory changes in the median distributions bilaterally.  Has positive Tinel's and positive Durkan's test bilaterally.  There is tenderness over the CMC joints     EMG nerve conduction study: Nerve conduction study from 06/15/2020 has been reviewed.  It was consistent with mild bilateral carpal tunnel syndrome     Assessment: Bilateral carpal tunnel syndrome     Plan:     1.  After consent was obtained injection was placed both the right and left carpal tunnels     2.  He will follow up in 3 months for repeat evaluation.  Based off of the response to the injection we may consider possibility of release if he was had a return of his symptoms

## 2024-05-15 NOTE — PROCEDURES
Carpal Tunnel    Date/Time: 5/15/2024 10:20 AM    Performed by: Poli Wilkins MD  Authorized by: Poli Wilkins MD    Consent Done?:  Yes (Verbal)  Indications:  Pain  Site marked: the procedure site was marked    Timeout: prior to procedure the correct patient, procedure, and site was verified    Prep: patient was prepped and draped in usual sterile fashion      Local anesthesia used?: Yes    Local anesthetic:  Lidocaine 1% without epinephrine  Anesthetic total (ml):  0.5    Location:  Wrist (Right carpal tunnel)  Site:  R carpal tunnel  Needle size:  25 G  Medications:  40 mg triamcinolone acetonide 40 mg/mL (20 mg injected)  Patient tolerance:  Patient tolerated the procedure well with no immediate complications

## 2024-05-15 NOTE — PROCEDURES
Carpal Tunnel    Date/Time: 5/15/2024 10:20 AM    Performed by: Poli Wilkins MD  Authorized by: Poli Wilkins MD    Consent Done?:  Yes (Verbal)  Indications:  Pain  Site marked: the procedure site was marked    Timeout: prior to procedure the correct patient, procedure, and site was verified    Prep: patient was prepped and draped in usual sterile fashion      Local anesthesia used?: Yes    Local anesthetic:  Lidocaine 1% without epinephrine  Anesthetic total (ml):  0.5    Location:  Wrist (Left carpal tunnel)  Site:  L carpal tunnel  Needle size:  25 G  Medications:  40 mg triamcinolone acetonide 40 mg/mL (20 mg injected)  Patient tolerance:  Patient tolerated the procedure well with no immediate complications

## 2024-06-02 ENCOUNTER — PATIENT MESSAGE (OUTPATIENT)
Dept: FAMILY MEDICINE | Facility: CLINIC | Age: 64
End: 2024-06-02
Payer: COMMERCIAL

## 2024-06-18 ENCOUNTER — HOSPITAL ENCOUNTER (OUTPATIENT)
Dept: RADIOLOGY | Facility: HOSPITAL | Age: 64
Discharge: HOME | End: 2024-06-18
Attending: FAMILY MEDICINE
Payer: COMMERCIAL

## 2024-06-18 ENCOUNTER — OFFICE VISIT (OUTPATIENT)
Dept: FAMILY MEDICINE | Facility: CLINIC | Age: 64
End: 2024-06-18
Payer: COMMERCIAL

## 2024-06-18 VITALS
HEART RATE: 63 BPM | WEIGHT: 194.88 LBS | OXYGEN SATURATION: 95 % | SYSTOLIC BLOOD PRESSURE: 138 MMHG | BODY MASS INDEX: 31.32 KG/M2 | DIASTOLIC BLOOD PRESSURE: 76 MMHG | HEIGHT: 66 IN | RESPIRATION RATE: 18 BRPM

## 2024-06-18 DIAGNOSIS — M17.32 POST-TRAUMATIC OSTEOARTHRITIS OF LEFT KNEE: ICD-10-CM

## 2024-06-18 DIAGNOSIS — M17.32 POST-TRAUMATIC OSTEOARTHRITIS OF LEFT KNEE: Primary | ICD-10-CM

## 2024-06-18 DIAGNOSIS — Z00.00 PREVENTATIVE HEALTH CARE: ICD-10-CM

## 2024-06-18 DIAGNOSIS — Z13.6 ENCOUNTER FOR SCREENING FOR CARDIOVASCULAR DISORDERS: ICD-10-CM

## 2024-06-18 PROCEDURE — 1160F RVW MEDS BY RX/DR IN RCRD: CPT | Mod: CPTII,S$GLB,, | Performed by: FAMILY MEDICINE

## 2024-06-18 PROCEDURE — 3075F SYST BP GE 130 - 139MM HG: CPT | Mod: CPTII,S$GLB,, | Performed by: FAMILY MEDICINE

## 2024-06-18 PROCEDURE — 99999 PR PBB SHADOW E&M-EST. PATIENT-LVL IV: CPT | Mod: PBBFAC,,, | Performed by: FAMILY MEDICINE

## 2024-06-18 PROCEDURE — 73562 X-RAY EXAM OF KNEE 3: CPT | Mod: TC,50,FY,PO

## 2024-06-18 PROCEDURE — 1159F MED LIST DOCD IN RCRD: CPT | Mod: CPTII,S$GLB,, | Performed by: FAMILY MEDICINE

## 2024-06-18 PROCEDURE — 3008F BODY MASS INDEX DOCD: CPT | Mod: CPTII,S$GLB,, | Performed by: FAMILY MEDICINE

## 2024-06-18 PROCEDURE — 99213 OFFICE O/P EST LOW 20 MIN: CPT | Mod: S$GLB,,, | Performed by: FAMILY MEDICINE

## 2024-06-18 PROCEDURE — 3078F DIAST BP <80 MM HG: CPT | Mod: CPTII,S$GLB,, | Performed by: FAMILY MEDICINE

## 2024-06-18 NOTE — PROGRESS NOTES
Subjective:       Patient ID: Efra Corey is a 64 y.o. male.    Chief Complaint: Knee Pain    C/o progressive left knee pain.  Getting some intermittent swelling with prolonged walking.  Some limited flexion past 90 degrees.  Knee feels unstable.  Wearing knee brace with prolonged walking.  The knee does limit his ability to move and do what he wants to do.  He had ACL repaired on that knee in  by Dr. Susanna Alanis.  S/p Orthovisc injections in 2019.              Past Medical History:   Diagnosis Date    Basal cell carcinoma 2018    R neck, R nasal dorsum, L ala    Colon polyp     Diverticulosis     DJD (degenerative joint disease) of knee        Past Surgical History:   Procedure Laterality Date    COLONOSCOPY      COLONOSCOPY N/A 2018    Procedure: COLONOSCOPY;  Surgeon: Efra Paul MD;  Location: [facility] ENDO;  Service: Endoscopy;  Laterality: N/A; 1 colon polyp removed, repeat in 5 years for surveillance; biopsy: Sessile serrated polyp    COLONOSCOPY N/A 2023    Procedure: COLONOSCOPY;  Surgeon: Efra Paul MD;  Location: [facility] ENDO;  Service: Endoscopy;  Laterality: N/A;    FUSION OF METATARSOPHALANGEAL JOINT Right 2021    Procedure: FUSION, MTP JOINT;  Surgeon: Lb Schrader DPM;  Location: [facility] OR;  Service: Podiatry;  Laterality: Right;    KNEE ARTHROSCOPY W/ ACL RECONSTRUCTION      left; Dr. Rock Alanis    MOUTH SURGERY      SKIN CANCER EXCISION         Review of patient's allergies indicates:  No Known Allergies    Social History     Socioeconomic History    Marital status:     Number of children: 2   Occupational History    Occupation: [employer]   Tobacco Use    Smoking status: Never    Smokeless tobacco: Never   Substance and Sexual Activity    Alcohol use: Yes     Alcohol/week: 1.0 standard drink of alcohol     Types: 1 Cans of beer per week     Comment: social    Drug use: No   Social History Narrative    : wife  of breast cancer  "       Exercise: regular        From [place], [place]       Current Outpatient Medications on File Prior to Visit   Medication Sig Dispense Refill    fluorouraciL (EFUDEX) 5 % cream AAA bid x 2 weeks 40 g 1    sildenafil (REVATIO) 20 mg Tab Take 1 tablet (20 mg total) by mouth 3 (three) times daily. 90 tablet 3    acyclovir (ZOVIRAX) 400 MG tablet TAKE 1 TABLET BY MOUTH TWICE A DAY (Patient not taking: Reported on 9/27/2022) 180 tablet 2    gabapentin (NEURONTIN) 300 MG capsule Take 1 capsule (300 mg total) by mouth every evening. (Patient not taking: Reported on 9/12/2023) 30 capsule 11    varicella-zoster gE-AS01B, PF, (SHINGRIX) 50 mcg/0.5 mL injection Inject into the muscle. (Patient not taking: Reported on 6/18/2024) 1 each 1     No current facility-administered medications on file prior to visit.       Family History   Problem Relation Name Age of Onset    Colon cancer Neg Hx      Crohn's disease Neg Hx      Ulcerative colitis Neg Hx      Celiac disease Neg Hx         Review of Systems   Constitutional:  Negative for appetite change, chills, fever and unexpected weight change.   HENT:  Negative for sore throat and trouble swallowing.    Eyes:  Negative for pain and visual disturbance.   Respiratory:  Negative for cough, chest tightness, shortness of breath and wheezing.    Cardiovascular:  Negative for chest pain, palpitations and leg swelling.   Gastrointestinal:  Negative for abdominal pain, blood in stool, constipation, diarrhea and nausea.   Genitourinary:  Negative for difficulty urinating, dysuria and hematuria.   Musculoskeletal:  Positive for arthralgias. Negative for gait problem and neck pain.   Skin:  Negative for rash and wound.   Neurological:  Negative for dizziness, weakness, light-headedness and headaches.   Hematological:  Negative for adenopathy.   Psychiatric/Behavioral:  Negative for dysphoric mood.        Objective:      /76   Pulse 63   Resp 18   Ht 5' 6" (1.676 m)   Wt 88.4 kg (194 " lb 14.2 oz)   SpO2 95%   BMI 31.46 kg/m²   Physical Exam  Constitutional:       General: He is not in acute distress.     Appearance: He is well-developed.   HENT:      Head: Normocephalic and atraumatic.      Right Ear: External ear normal.      Left Ear: External ear normal.      Mouth/Throat:      Pharynx: Uvula midline. No oropharyngeal exudate.   Eyes:      General: Lids are normal.      Conjunctiva/sclera: Conjunctivae normal.      Pupils: Pupils are equal, round, and reactive to light.   Neck:      Thyroid: No thyroid mass or thyromegaly.      Trachea: Phonation normal.   Cardiovascular:      Rate and Rhythm: Normal rate and regular rhythm.      Heart sounds: Normal heart sounds. No murmur heard.     No friction rub. No gallop.   Pulmonary:      Effort: Pulmonary effort is normal. No respiratory distress.      Breath sounds: Normal breath sounds. No wheezing or rales.   Musculoskeletal:      Cervical back: Full passive range of motion without pain, normal range of motion and neck supple.      Right knee: Normal.      Left knee: Crepitus present. Decreased range of motion.   Lymphadenopathy:      Cervical: No cervical adenopathy.   Skin:     General: Skin is warm and dry.   Neurological:      Mental Status: He is alert and oriented to person, place, and time.      Cranial Nerves: No cranial nerve deficit.      Coordination: Coordination normal.   Psychiatric:         Speech: Speech normal.         Behavior: Behavior normal.         Thought Content: Thought content normal.         Judgment: Judgment normal.         Assessment:       1. Post-traumatic osteoarthritis of left knee    2. Encounter for screening for cardiovascular disorders    3. Preventative health care        Plan:       Post-traumatic osteoarthritis of left knee  -     X-ray Knee Ortho Bilateral; Future; Expected date: 06/18/2024  -     Ambulatory referral/consult to Orthopedics; Future; Expected date: 06/25/2024    Encounter for screening for  cardiovascular disorders  -     CT Cardiac Scoring; Future; Expected date: 09/18/2024    Preventative health care  -     Comprehensive Metabolic Panel; Future; Expected date: 09/16/2024  -     Hemoglobin A1C; Future; Expected date: 09/16/2024  -     Lipid Panel; Future; Expected date: 09/16/2024  -     PSA, Screening; Future; Expected date: 09/16/2024  -     CBC Auto Differential; Future; Expected date: 09/16/2024      Knee xrays and ortho consultation with Dr. Cedeno to discuss knee replacement surgery  F/u 3 months with labs and coronary CT for routine physical  Counseled on regular exercise, maintenance of a healthy weight, balanced diet rich in fruits/vegetables and lean protein, and avoidance of unhealthy habits like smoking and excessive alcohol intake.

## 2024-08-01 ENCOUNTER — OFFICE VISIT (OUTPATIENT)
Dept: ORTHOPEDICS | Facility: CLINIC | Age: 64
End: 2024-08-01
Payer: COMMERCIAL

## 2024-08-01 VITALS — BODY MASS INDEX: 31.32 KG/M2 | HEIGHT: 66 IN | WEIGHT: 194.88 LBS

## 2024-08-01 DIAGNOSIS — M17.32 POST-TRAUMATIC OSTEOARTHRITIS OF LEFT KNEE: ICD-10-CM

## 2024-08-01 PROCEDURE — 99204 OFFICE O/P NEW MOD 45 MIN: CPT | Mod: 25,S$GLB,, | Performed by: ORTHOPAEDIC SURGERY

## 2024-08-01 PROCEDURE — 20610 DRAIN/INJ JOINT/BURSA W/O US: CPT | Mod: LT,S$GLB,, | Performed by: ORTHOPAEDIC SURGERY

## 2024-08-01 PROCEDURE — 3008F BODY MASS INDEX DOCD: CPT | Mod: CPTII,S$GLB,, | Performed by: ORTHOPAEDIC SURGERY

## 2024-08-01 PROCEDURE — 99999 PR PBB SHADOW E&M-EST. PATIENT-LVL III: CPT | Mod: PBBFAC,,, | Performed by: ORTHOPAEDIC SURGERY

## 2024-08-01 PROCEDURE — 1160F RVW MEDS BY RX/DR IN RCRD: CPT | Mod: CPTII,S$GLB,, | Performed by: ORTHOPAEDIC SURGERY

## 2024-08-01 PROCEDURE — 1159F MED LIST DOCD IN RCRD: CPT | Mod: CPTII,S$GLB,, | Performed by: ORTHOPAEDIC SURGERY

## 2024-08-01 RX ADMIN — TRIAMCINOLONE ACETONIDE 40 MG: 40 INJECTION, SUSPENSION INTRA-ARTICULAR; INTRAMUSCULAR at 03:08

## 2024-08-06 RX ORDER — TRIAMCINOLONE ACETONIDE 40 MG/ML
40 INJECTION, SUSPENSION INTRA-ARTICULAR; INTRAMUSCULAR
Status: DISCONTINUED | OUTPATIENT
Start: 2024-08-01 | End: 2024-08-06 | Stop reason: HOSPADM

## 2024-08-14 ENCOUNTER — OFFICE VISIT (OUTPATIENT)
Dept: ORTHOPEDICS | Facility: CLINIC | Age: 64
End: 2024-08-14
Payer: COMMERCIAL

## 2024-08-14 DIAGNOSIS — G56.03 CARPAL TUNNEL SYNDROME, BILATERAL: Primary | ICD-10-CM

## 2024-08-14 PROCEDURE — 99213 OFFICE O/P EST LOW 20 MIN: CPT | Mod: 25,S$GLB,, | Performed by: ORTHOPAEDIC SURGERY

## 2024-08-14 PROCEDURE — 20526 THER INJECTION CARP TUNNEL: CPT | Mod: 50,S$GLB,, | Performed by: ORTHOPAEDIC SURGERY

## 2024-08-14 PROCEDURE — 99999 PR PBB SHADOW E&M-EST. PATIENT-LVL II: CPT | Mod: PBBFAC,,, | Performed by: ORTHOPAEDIC SURGERY

## 2024-08-14 RX ORDER — TRIAMCINOLONE ACETONIDE 40 MG/ML
40 INJECTION, SUSPENSION INTRA-ARTICULAR; INTRAMUSCULAR
Status: DISCONTINUED | OUTPATIENT
Start: 2024-08-14 | End: 2024-08-14 | Stop reason: HOSPADM

## 2024-08-14 RX ADMIN — TRIAMCINOLONE ACETONIDE 40 MG: 40 INJECTION, SUSPENSION INTRA-ARTICULAR; INTRAMUSCULAR at 10:08

## 2024-08-14 NOTE — PROCEDURES
Carpal Tunnel    Date/Time: 8/14/2024 10:20 AM    Performed by: Poli Wilkins MD  Authorized by: Poli Wilkins MD    Consent Done?:  Yes (Verbal)  Indications:  Pain  Site marked: the procedure site was marked    Timeout: prior to procedure the correct patient, procedure, and site was verified    Prep: patient was prepped and draped in usual sterile fashion      Local anesthesia used?: Yes    Local anesthetic:  Lidocaine 1% without epinephrine  Anesthetic total (ml):  0.5    Location:  Wrist (Left carpal tunnel)  Site:  L carpal tunnel  Needle size:  25 G  Medications:  40 mg triamcinolone acetonide 40 mg/mL (20 mg injected)  Patient tolerance:  Patient tolerated the procedure well with no immediate complications

## 2024-08-14 NOTE — PROCEDURES
Carpal Tunnel    Date/Time: 8/14/2024 10:20 AM    Performed by: Poli Wilkins MD  Authorized by: Poli Wilkins MD    Consent Done?:  Yes (Verbal)  Indications:  Pain  Site marked: the procedure site was marked    Timeout: prior to procedure the correct patient, procedure, and site was verified    Prep: patient was prepped and draped in usual sterile fashion      Local anesthesia used?: Yes    Local anesthetic:  Lidocaine 1% without epinephrine  Anesthetic total (ml):  0.5    Location:  Wrist (Right carpal tunnel)  Site:  R carpal tunnel  Needle size:  25 G  Medications:  40 mg triamcinolone acetonide 40 mg/mL (20 mg injected)  Patient tolerance:  Patient tolerated the procedure well with no immediate complications

## 2024-08-14 NOTE — PROGRESS NOTES
Mr Corey returns to clinic today.  Has a history of bilateral carpal tunnel syndrome.  He has been injected once several months ago.  He had very good response to the injection.  He was had a return of his symptoms.      Physical exam: Examination of bilateral hands reveals that there is no edema.  There are no major skin changes.  Palpation produces no tenderness.  He was grossly intact sensation in the median radial and ulnar distribution.  He has negative Tinel's and negative Durkan's.      EMG/nerve conduction study: Nerve conduction study has been reviewed.  It was consistent with mild bilateral carpal tunnel     Assessment: Bilateral carpal tunnel syndrome     Plan:    1.  I have discussed treatment.  I have discussed the possibility of carpal tunnel release.  The patient states that he does have an upcoming vacation and does not want to do surgery at this time.  He has requested a 2nd steroid injection.  I have discussed the risks of repeating steroid injections.      2. After consent was obtained injection was placed to the right and left carpal tunnels     3.  This was the final injection.  If he has any further symptoms I will discuss the possibility of carpal tunnel release.

## 2024-09-12 ENCOUNTER — HOSPITAL ENCOUNTER (OUTPATIENT)
Dept: RADIOLOGY | Facility: HOSPITAL | Age: 64
Discharge: HOME | End: 2024-09-12
Attending: FAMILY MEDICINE
Payer: COMMERCIAL

## 2024-09-12 DIAGNOSIS — Z13.6 ENCOUNTER FOR SCREENING FOR CARDIOVASCULAR DISORDERS: ICD-10-CM

## 2024-09-12 PROCEDURE — 75571 CT HRT W/O DYE W/CA TEST: CPT | Mod: TC,PO

## 2024-09-12 PROCEDURE — 75571 CT HRT W/O DYE W/CA TEST: CPT | Mod: 26,,, | Performed by: STUDENT IN AN ORGANIZED HEALTH CARE EDUCATION/TRAINING PROGRAM

## 2024-09-18 ENCOUNTER — OFFICE VISIT (OUTPATIENT)
Dept: FAMILY MEDICINE | Facility: CLINIC | Age: 64
End: 2024-09-18
Payer: COMMERCIAL

## 2024-09-18 VITALS
OXYGEN SATURATION: 96 % | RESPIRATION RATE: 18 BRPM | WEIGHT: 196.44 LBS | DIASTOLIC BLOOD PRESSURE: 74 MMHG | HEIGHT: 66 IN | SYSTOLIC BLOOD PRESSURE: 126 MMHG | HEART RATE: 70 BPM | BODY MASS INDEX: 31.57 KG/M2

## 2024-09-18 DIAGNOSIS — Z00.00 PREVENTATIVE HEALTH CARE: Primary | ICD-10-CM

## 2024-09-18 PROCEDURE — 99999 PR PBB SHADOW E&M-EST. PATIENT-LVL III: CPT | Mod: PBBFAC,,, | Performed by: FAMILY MEDICINE

## 2024-09-18 PROCEDURE — 1159F MED LIST DOCD IN RCRD: CPT | Mod: CPTII,S$GLB,, | Performed by: FAMILY MEDICINE

## 2024-09-18 PROCEDURE — 3044F HG A1C LEVEL LT 7.0%: CPT | Mod: CPTII,S$GLB,, | Performed by: FAMILY MEDICINE

## 2024-09-18 PROCEDURE — 3008F BODY MASS INDEX DOCD: CPT | Mod: CPTII,S$GLB,, | Performed by: FAMILY MEDICINE

## 2024-09-18 PROCEDURE — 1160F RVW MEDS BY RX/DR IN RCRD: CPT | Mod: CPTII,S$GLB,, | Performed by: FAMILY MEDICINE

## 2024-09-18 PROCEDURE — 90471 IMMUNIZATION ADMIN: CPT | Mod: S$GLB,,, | Performed by: FAMILY MEDICINE

## 2024-09-18 PROCEDURE — 3074F SYST BP LT 130 MM HG: CPT | Mod: CPTII,S$GLB,, | Performed by: FAMILY MEDICINE

## 2024-09-18 PROCEDURE — 90656 IIV3 VACC NO PRSV 0.5 ML IM: CPT | Mod: S$GLB,,, | Performed by: FAMILY MEDICINE

## 2024-09-18 PROCEDURE — 3078F DIAST BP <80 MM HG: CPT | Mod: CPTII,S$GLB,, | Performed by: FAMILY MEDICINE

## 2024-09-18 PROCEDURE — 99396 PREV VISIT EST AGE 40-64: CPT | Mod: 25,S$GLB,, | Performed by: FAMILY MEDICINE

## 2024-09-18 NOTE — PROGRESS NOTES
Subjective:       Patient ID: Efra Corey is a 64 y.o. male.    Chief Complaint: Preventative Health Care (Physical)      Patient presents with:  Preventative Health Care: Physical    Overall feeling well    Following with ortho for left knee DJD    Past Medical History:    Colon polyp                                              Past Surgical History:    KNEE ARTHROSCOPY W/ ACL RECONSTRUCTION                       Comment:left; Dr. Rock Alanis    MOUTH SURGERY                                                  No Known Allergies    Social History    Marital Status: Single              Spouse Name:                       Years of Education:                 Number of children: 2             Occupational History  Occupation          Employer            Comment               [employer]                         Social History Main Topics    Smoking Status: Never Smoker                      Smokeless Status: Never Used                        Alcohol Use: Yes                Comment: social    Drug Use: No              Sexual Activity: Not on file          Other Topics            Concern    None on file    Social History Narrative    : wife  of breast cancer      Exercise: regular      From [place], [place]    Current Outpatient Medications on File Prior to Visit:  acyclovir (ZOVIRAX) 400 MG tablet, TAKE 1 TABLET BY MOUTH TWICE A DAY, Disp: 180 tablet, Rfl: 2  fluorouraciL (EFUDEX) 5 % cream, AAA bid x 2 weeks, Disp: 40 g, Rfl: 1  sildenafil (REVATIO) 20 mg Tab, Take 1 tablet (20 mg total) by mouth 3 (three) times daily., Disp: 90 tablet, Rfl: 3  gabapentin (NEURONTIN) 300 MG capsule, Take 1 capsule (300 mg total) by mouth every evening. (Patient not taking: Reported on 2023), Disp: 30 capsule, Rfl: 11  varicella-zoster gE-AS01B, PF, (SHINGRIX) 50 mcg/0.5 mL injection, Inject into the muscle. (Patient not taking: Reported on 2024), Disp: 1 each, Rfl: 1    No current facility-administered  "medications on file prior to visit.                        Review of Systems   Constitutional:  Negative for appetite change, chills, fever and unexpected weight change.   HENT:  Negative for sore throat and trouble swallowing.    Eyes:  Negative for pain and visual disturbance.   Respiratory:  Negative for cough, chest tightness, shortness of breath and wheezing.    Cardiovascular:  Negative for chest pain, palpitations and leg swelling.   Gastrointestinal:  Negative for abdominal pain, blood in stool, constipation, diarrhea and nausea.   Genitourinary:  Negative for difficulty urinating, dysuria and hematuria.   Musculoskeletal:  Positive for arthralgias (shoulders and knees), neck pain and neck stiffness. Negative for gait problem.   Skin:  Negative for rash and wound.   Neurological:  Negative for dizziness, weakness, light-headedness and headaches.   Hematological:  Negative for adenopathy.   Psychiatric/Behavioral:  Negative for dysphoric mood.        Objective:       /74   Pulse 70   Resp 18   Ht 5' 6" (1.676 m)   Wt 89.1 kg (196 lb 6.9 oz)   SpO2 96%   BMI 31.70 kg/m²     Physical Exam  Constitutional:       General: He is not in acute distress.     Appearance: He is well-developed.   HENT:      Head: Normocephalic and atraumatic.      Right Ear: External ear normal.      Left Ear: External ear normal.      Mouth/Throat:      Pharynx: Uvula midline. No oropharyngeal exudate.   Eyes:      General: Lids are normal.      Conjunctiva/sclera: Conjunctivae normal.      Pupils: Pupils are equal, round, and reactive to light.   Neck:      Thyroid: No thyroid mass or thyromegaly.      Trachea: Phonation normal.   Cardiovascular:      Rate and Rhythm: Normal rate and regular rhythm.      Heart sounds: Normal heart sounds. No murmur heard.     No friction rub. No gallop.   Pulmonary:      Effort: Pulmonary effort is normal. No respiratory distress.      Breath sounds: Normal breath sounds. No wheezing or " rales.   Abdominal:      General: Bowel sounds are normal. There is no distension.      Palpations: Abdomen is soft. There is no mass.      Tenderness: There is no abdominal tenderness. There is no guarding or rebound.   Musculoskeletal:      Right shoulder: Crepitus present. No tenderness or bony tenderness. Decreased range of motion. Normal strength.      Left shoulder: Crepitus present. No tenderness or bony tenderness. Decreased range of motion. Normal strength.      Cervical back: Full passive range of motion without pain, normal range of motion and neck supple. No spasms.      Right knee: Normal range of motion. Abnormal patellar mobility (some crepitus).      Left knee: Decreased range of motion (limited extension). Tenderness present over the medial joint line and lateral joint line.   Lymphadenopathy:      Cervical: No cervical adenopathy.   Skin:     General: Skin is warm and dry.   Neurological:      Mental Status: He is alert and oriented to person, place, and time.      Cranial Nerves: No cranial nerve deficit.      Coordination: Coordination normal.   Psychiatric:         Speech: Speech normal.         Behavior: Behavior normal.         Thought Content: Thought content normal.         Judgment: Judgment normal.           Results for orders placed or performed in visit on 09/12/24   Comprehensive Metabolic Panel   Result Value Ref Range    Sodium 139 136 - 145 mmol/L    Potassium 4.3 3.5 - 5.1 mmol/L    Chloride 107 95 - 110 mmol/L    CO2 27 23 - 29 mmol/L    Glucose 88 70 - 110 mg/dL    BUN 16 8 - 23 mg/dL    Creatinine 1.3 0.5 - 1.4 mg/dL    Calcium 8.9 8.7 - 10.5 mg/dL    Total Protein 7.0 6.0 - 8.4 g/dL    Albumin 3.9 3.5 - 5.2 g/dL    Total Bilirubin 0.6 0.1 - 1.0 mg/dL    Alkaline Phosphatase 60 55 - 135 U/L    AST 21 10 - 40 U/L    ALT 17 10 - 44 U/L    eGFR >60.0 >60 mL/min/1.73 m^2    Anion Gap 5 (L) 8 - 16 mmol/L   Hemoglobin A1C   Result Value Ref Range    Hemoglobin A1C 5.4 4.0 - 5.6 %     Estimated Avg Glucose 108 68 - 131 mg/dL   Lipid Panel   Result Value Ref Range    Cholesterol 186 120 - 199 mg/dL    Triglycerides 96 30 - 150 mg/dL    HDL 51 40 - 75 mg/dL    LDL Cholesterol 115.8 63.0 - 159.0 mg/dL    HDL/Cholesterol Ratio 27.4 20.0 - 50.0 %    Total Cholesterol/HDL Ratio 3.6 2.0 - 5.0    Non-HDL Cholesterol 135 mg/dL   PSA, Screening   Result Value Ref Range    PSA, Screen 0.95 0.00 - 4.00 ng/mL   CBC Auto Differential   Result Value Ref Range    WBC 5.73 3.90 - 12.70 K/uL    RBC 5.00 4.60 - 6.20 M/uL    Hemoglobin 15.0 14.0 - 18.0 g/dL    Hematocrit 45.4 40.0 - 54.0 %    MCV 91 82 - 98 fL    MCH 30.0 27.0 - 31.0 pg    MCHC 33.0 32.0 - 36.0 g/dL    RDW 12.8 11.5 - 14.5 %    Platelets 189 150 - 450 K/uL    MPV 10.0 9.2 - 12.9 fL    Immature Granulocytes 0.3 0.0 - 0.5 %    Gran # (ANC) 3.5 1.8 - 7.7 K/uL    Immature Grans (Abs) 0.02 0.00 - 0.04 K/uL    Lymph # 1.4 1.0 - 4.8 K/uL    Mono # 0.5 0.3 - 1.0 K/uL    Eos # 0.2 0.0 - 0.5 K/uL    Baso # 0.06 0.00 - 0.20 K/uL    nRBC 0 0 /100 WBC    Gran % 61.6 38.0 - 73.0 %    Lymph % 24.8 18.0 - 48.0 %    Mono % 9.2 4.0 - 15.0 %    Eosinophil % 3.1 0.0 - 8.0 %    Basophil % 1.0 0.0 - 1.9 %    Differential Method Automated        Assessment:       1. Preventative health care          Plan:       Preventative health care  -     influenza (Flulaval, Fluzone, Fluarix) 45 mcg/0.5 mL IM vaccine (> or = 6 mo) 0.5 mL      Overall doing well  Discussed regular knee stretching  Counseled on regular exercise, maintenance of a healthy weight, balanced diet rich in fruits/vegetables and lean protein, and avoidance of unhealthy habits like smoking and excessive alcohol intake.

## 2024-10-15 DIAGNOSIS — M47.22 OSTEOARTHRITIS OF SPINE WITH RADICULOPATHY, CERVICAL REGION: ICD-10-CM

## 2024-10-15 DIAGNOSIS — M54.12 RADICULOPATHY, CERVICAL REGION: ICD-10-CM

## 2024-10-15 RX ORDER — GABAPENTIN 300 MG/1
300 CAPSULE ORAL NIGHTLY
Qty: 30 CAPSULE | Refills: 11 | Status: SHIPPED | OUTPATIENT
Start: 2024-10-15 | End: 2025-10-15

## 2024-10-15 NOTE — TELEPHONE ENCOUNTER
No care due was identified.  [facility] Embedded Care Due Messages. Reference number: [id number].   10/15/2024 8:11:30 AM CDT

## 2024-11-05 ENCOUNTER — OFFICE VISIT (OUTPATIENT)
Dept: FAMILY MEDICINE | Facility: CLINIC | Age: 64
End: 2024-11-05
Payer: COMMERCIAL

## 2024-11-05 ENCOUNTER — OFFICE VISIT (OUTPATIENT)
Dept: ORTHOPEDICS | Facility: CLINIC | Age: 64
End: 2024-11-05
Payer: COMMERCIAL

## 2024-11-05 VITALS
HEIGHT: 66 IN | DIASTOLIC BLOOD PRESSURE: 64 MMHG | WEIGHT: 201.5 LBS | RESPIRATION RATE: 18 BRPM | SYSTOLIC BLOOD PRESSURE: 136 MMHG | BODY MASS INDEX: 32.38 KG/M2 | OXYGEN SATURATION: 96 % | HEART RATE: 59 BPM

## 2024-11-05 VITALS
BODY MASS INDEX: 31.57 KG/M2 | HEART RATE: 70 BPM | SYSTOLIC BLOOD PRESSURE: 126 MMHG | WEIGHT: 196.44 LBS | DIASTOLIC BLOOD PRESSURE: 74 MMHG | HEIGHT: 66 IN

## 2024-11-05 DIAGNOSIS — M25.562 CHRONIC PAIN OF LEFT KNEE: Primary | ICD-10-CM

## 2024-11-05 DIAGNOSIS — M17.32 POST-TRAUMATIC OSTEOARTHRITIS OF LEFT KNEE: ICD-10-CM

## 2024-11-05 DIAGNOSIS — G89.29 CHRONIC PAIN OF LEFT KNEE: Primary | ICD-10-CM

## 2024-11-05 DIAGNOSIS — Z01.818 PREOP EXAMINATION: Primary | ICD-10-CM

## 2024-11-05 DIAGNOSIS — M17.12 PRIMARY OSTEOARTHRITIS OF LEFT KNEE: ICD-10-CM

## 2024-11-05 DIAGNOSIS — Z01.818 PRE-OP TESTING: ICD-10-CM

## 2024-11-05 PROCEDURE — 3078F DIAST BP <80 MM HG: CPT | Mod: CPTII,S$GLB,, | Performed by: ORTHOPAEDIC SURGERY

## 2024-11-05 PROCEDURE — 99213 OFFICE O/P EST LOW 20 MIN: CPT | Mod: S$GLB,,, | Performed by: FAMILY MEDICINE

## 2024-11-05 PROCEDURE — 1159F MED LIST DOCD IN RCRD: CPT | Mod: CPTII,S$GLB,, | Performed by: ORTHOPAEDIC SURGERY

## 2024-11-05 PROCEDURE — 99999 PR PBB SHADOW E&M-EST. PATIENT-LVL III: CPT | Mod: PBBFAC,,, | Performed by: FAMILY MEDICINE

## 2024-11-05 PROCEDURE — 3044F HG A1C LEVEL LT 7.0%: CPT | Mod: CPTII,S$GLB,, | Performed by: FAMILY MEDICINE

## 2024-11-05 PROCEDURE — 3074F SYST BP LT 130 MM HG: CPT | Mod: CPTII,S$GLB,, | Performed by: ORTHOPAEDIC SURGERY

## 2024-11-05 PROCEDURE — 3044F HG A1C LEVEL LT 7.0%: CPT | Mod: CPTII,S$GLB,, | Performed by: ORTHOPAEDIC SURGERY

## 2024-11-05 PROCEDURE — 99214 OFFICE O/P EST MOD 30 MIN: CPT | Mod: S$GLB,,, | Performed by: ORTHOPAEDIC SURGERY

## 2024-11-05 PROCEDURE — 3008F BODY MASS INDEX DOCD: CPT | Mod: CPTII,S$GLB,, | Performed by: ORTHOPAEDIC SURGERY

## 2024-11-05 PROCEDURE — 1159F MED LIST DOCD IN RCRD: CPT | Mod: CPTII,S$GLB,, | Performed by: FAMILY MEDICINE

## 2024-11-05 PROCEDURE — 3078F DIAST BP <80 MM HG: CPT | Mod: CPTII,S$GLB,, | Performed by: FAMILY MEDICINE

## 2024-11-05 PROCEDURE — 99999 PR PBB SHADOW E&M-EST. PATIENT-LVL V: CPT | Mod: PBBFAC,,, | Performed by: ORTHOPAEDIC SURGERY

## 2024-11-05 PROCEDURE — 1160F RVW MEDS BY RX/DR IN RCRD: CPT | Mod: CPTII,S$GLB,, | Performed by: ORTHOPAEDIC SURGERY

## 2024-11-05 PROCEDURE — 3008F BODY MASS INDEX DOCD: CPT | Mod: CPTII,S$GLB,, | Performed by: FAMILY MEDICINE

## 2024-11-05 PROCEDURE — 3075F SYST BP GE 130 - 139MM HG: CPT | Mod: CPTII,S$GLB,, | Performed by: FAMILY MEDICINE

## 2024-11-05 RX ORDER — CEFAZOLIN SODIUM 2 G/50ML
2 SOLUTION INTRAVENOUS
OUTPATIENT
Start: 2024-11-05

## 2024-11-05 RX ORDER — SODIUM CHLORIDE 0.9 % (FLUSH) 0.9 %
10 SYRINGE (ML) INJECTION EVERY 6 HOURS PRN
Status: SHIPPED | OUTPATIENT
Start: 2024-12-16

## 2024-11-05 NOTE — PROGRESS NOTES
Subjective:       Patient ID: Efra Corey is a 64 y.o. male.    Chief Complaint: Pre-op Exam (Knee replacement, left knee; Dr Cedeno)      Patient presents with:  Preop    Overall feeling well    Following with ortho for left knee DJD with planned knee replacement in December.    Recently went hiking with no CP of SOB.    Past Medical History:    Colon polyp                                              Past Surgical History:    KNEE ARTHROSCOPY W/ ACL RECONSTRUCTION                       Comment:left; Dr. Rock Alanis    MOUTH SURGERY                                                  No Known Allergies    Social History    Marital Status: Single              Spouse Name:                       Years of Education:                 Number of children: 2             Occupational History  Occupation          Employer            Comment               [employer]                         Social History Main Topics    Smoking Status: Never Smoker                      Smokeless Status: Never Used                        Alcohol Use: Yes                Comment: social    Drug Use: No              Sexual Activity: Not on file          Other Topics            Concern    None on file    Social History Narrative    : wife  of breast cancer      Exercise: regular      From [place], [place]    Current Outpatient Medications on File Prior to Visit:  acyclovir (ZOVIRAX) 400 MG tablet, TAKE 1 TABLET BY MOUTH TWICE A DAY, Disp: 180 tablet, Rfl: 2  fluorouraciL (EFUDEX) 5 % cream, AAA bid x 2 weeks, Disp: 40 g, Rfl: 1  sildenafil (REVATIO) 20 mg Tab, Take 1 tablet (20 mg total) by mouth 3 (three) times daily., Disp: 90 tablet, Rfl: 3  gabapentin (NEURONTIN) 300 MG capsule, Take 1 capsule (300 mg total) by mouth every evening. (Patient not taking: Reported on 2023), Disp: 30 capsule, Rfl: 11  varicella-zoster gE-AS01B, PF, (SHINGRIX) 50 mcg/0.5 mL injection, Inject into the muscle. (Patient not taking: Reported on  "6/18/2024), Disp: 1 each, Rfl: 1    No current facility-administered medications on file prior to visit.                        Review of Systems   Constitutional:  Negative for appetite change, chills, fever and unexpected weight change.   HENT:  Negative for sore throat and trouble swallowing.    Eyes:  Negative for pain and visual disturbance.   Respiratory:  Negative for cough, chest tightness, shortness of breath and wheezing.    Cardiovascular:  Negative for chest pain, palpitations and leg swelling.   Gastrointestinal:  Negative for abdominal pain, blood in stool, constipation, diarrhea and nausea.   Genitourinary:  Negative for difficulty urinating, dysuria and hematuria.   Musculoskeletal:  Positive for arthralgias (shoulders and knees), neck pain and neck stiffness. Negative for gait problem.   Skin:  Negative for rash and wound.   Neurological:  Negative for dizziness, weakness, light-headedness and headaches.   Hematological:  Negative for adenopathy.   Psychiatric/Behavioral:  Negative for dysphoric mood.        Objective:       /64   Pulse (!) 59   Resp 18   Ht 5' 6" (1.676 m)   Wt 91.4 kg (201 lb 8 oz)   SpO2 96%   BMI 32.52 kg/m²     Physical Exam  Constitutional:       General: He is not in acute distress.     Appearance: He is well-developed.   HENT:      Head: Normocephalic and atraumatic.      Right Ear: External ear normal.      Left Ear: External ear normal.      Mouth/Throat:      Pharynx: Uvula midline. No oropharyngeal exudate.   Eyes:      General: Lids are normal.      Conjunctiva/sclera: Conjunctivae normal.      Pupils: Pupils are equal, round, and reactive to light.   Neck:      Thyroid: No thyroid mass or thyromegaly.      Trachea: Phonation normal.   Cardiovascular:      Rate and Rhythm: Normal rate and regular rhythm.      Heart sounds: Normal heart sounds. No murmur heard.     No friction rub. No gallop.   Pulmonary:      Effort: Pulmonary effort is normal. No " respiratory distress.      Breath sounds: Normal breath sounds. No wheezing or rales.   Abdominal:      General: Bowel sounds are normal. There is no distension.      Palpations: Abdomen is soft. There is no mass.      Tenderness: There is no abdominal tenderness. There is no guarding or rebound.   Musculoskeletal:      Right shoulder: Crepitus present. No tenderness or bony tenderness. Decreased range of motion. Normal strength.      Left shoulder: Crepitus present. No tenderness or bony tenderness. Decreased range of motion. Normal strength.      Cervical back: Full passive range of motion without pain, normal range of motion and neck supple. No spasms.      Right knee: Normal range of motion. Abnormal patellar mobility (some crepitus).      Left knee: Decreased range of motion (limited extension). Tenderness present over the medial joint line and lateral joint line.   Lymphadenopathy:      Cervical: No cervical adenopathy.   Skin:     General: Skin is warm and dry.   Neurological:      Mental Status: He is alert and oriented to person, place, and time.      Cranial Nerves: No cranial nerve deficit.      Coordination: Coordination normal.   Psychiatric:         Speech: Speech normal.         Behavior: Behavior normal.         Thought Content: Thought content normal.         Judgment: Judgment normal.           Results for orders placed or performed in visit on 09/12/24   Comprehensive Metabolic Panel    Collection Time: 09/12/24  8:42 AM   Result Value Ref Range    Sodium 139 136 - 145 mmol/L    Potassium 4.3 3.5 - 5.1 mmol/L    Chloride 107 95 - 110 mmol/L    CO2 27 23 - 29 mmol/L    Glucose 88 70 - 110 mg/dL    BUN 16 8 - 23 mg/dL    Creatinine 1.3 0.5 - 1.4 mg/dL    Calcium 8.9 8.7 - 10.5 mg/dL    Total Protein 7.0 6.0 - 8.4 g/dL    Albumin 3.9 3.5 - 5.2 g/dL    Total Bilirubin 0.6 0.1 - 1.0 mg/dL    Alkaline Phosphatase 60 55 - 135 U/L    AST 21 10 - 40 U/L    ALT 17 10 - 44 U/L    eGFR >60.0 >60 mL/min/1.73  m^2    Anion Gap 5 (L) 8 - 16 mmol/L   Hemoglobin A1C    Collection Time: 09/12/24  8:42 AM   Result Value Ref Range    Hemoglobin A1C 5.4 4.0 - 5.6 %    Estimated Avg Glucose 108 68 - 131 mg/dL   Lipid Panel    Collection Time: 09/12/24  8:42 AM   Result Value Ref Range    Cholesterol 186 120 - 199 mg/dL    Triglycerides 96 30 - 150 mg/dL    HDL 51 40 - 75 mg/dL    LDL Cholesterol 115.8 63.0 - 159.0 mg/dL    HDL/Cholesterol Ratio 27.4 20.0 - 50.0 %    Total Cholesterol/HDL Ratio 3.6 2.0 - 5.0    Non-HDL Cholesterol 135 mg/dL   PSA, Screening    Collection Time: 09/12/24  8:42 AM   Result Value Ref Range    PSA, Screen 0.95 0.00 - 4.00 ng/mL   CBC Auto Differential    Collection Time: 09/12/24  8:42 AM   Result Value Ref Range    WBC 5.73 3.90 - 12.70 K/uL    RBC 5.00 4.60 - 6.20 M/uL    Hemoglobin 15.0 14.0 - 18.0 g/dL    Hematocrit 45.4 40.0 - 54.0 %    MCV 91 82 - 98 fL    MCH 30.0 27.0 - 31.0 pg    MCHC 33.0 32.0 - 36.0 g/dL    RDW 12.8 11.5 - 14.5 %    Platelets 189 150 - 450 K/uL    MPV 10.0 9.2 - 12.9 fL    Immature Granulocytes 0.3 0.0 - 0.5 %    Gran # (ANC) 3.5 1.8 - 7.7 K/uL    Immature Grans (Abs) 0.02 0.00 - 0.04 K/uL    Lymph # 1.4 1.0 - 4.8 K/uL    Mono # 0.5 0.3 - 1.0 K/uL    Eos # 0.2 0.0 - 0.5 K/uL    Baso # 0.06 0.00 - 0.20 K/uL    nRBC 0 0 /100 WBC    Gran % 61.6 38.0 - 73.0 %    Lymph % 24.8 18.0 - 48.0 %    Mono % 9.2 4.0 - 15.0 %    Eosinophil % 3.1 0.0 - 8.0 %    Basophil % 1.0 0.0 - 1.9 %    Differential Method Automated        Assessment:       1. Preop examination    2. Post-traumatic osteoarthritis of left knee            Plan:       Preop examination    Post-traumatic osteoarthritis of left knee        Overall doing well  Cleared to proceed with knee surgery as planned  Discussed regular knee stretching and weight loss efforts in anticipation of surgery  Counseled on regular exercise, maintenance of a healthy weight, balanced diet rich in fruits/vegetables and lean protein, and avoidance  of unhealthy habits like smoking and excessive alcohol intake.

## 2024-11-12 NOTE — H&P
Chief Complaint   Patient presents with    Left Knee - Pain         HPI:   This is a 64 y.o. who presents to clinic today complaining of left knee pain for 5 years after no known trauma. Pain is progressively worsening. No numbness or tingling. No associated signs or symptoms. He has failed NSAIDs, PT, and injections.     Past Medical History:   Diagnosis Date    Basal cell carcinoma 2018    R neck, R nasal dorsum, L ala    Colon polyp     Diverticulosis     DJD (degenerative joint disease) of knee      Past Surgical History:   Procedure Laterality Date    COLONOSCOPY      COLONOSCOPY N/A 11/5/2018    Procedure: COLONOSCOPY;  Surgeon: Efra Paul MD;  Location: [facility] ENDO;  Service: Endoscopy;  Laterality: N/A; 1 colon polyp removed, repeat in 5 years for surveillance; biopsy: Sessile serrated polyp    COLONOSCOPY N/A 12/20/2023    Procedure: COLONOSCOPY;  Surgeon: Efra Paul MD;  Location: [facility] ENDO;  Service: Endoscopy;  Laterality: N/A;    FUSION OF METATARSOPHALANGEAL JOINT Right 7/6/2021    Procedure: FUSION, MTP JOINT;  Surgeon: Lb Schrader DPM;  Location: [facility] OR;  Service: Podiatry;  Laterality: Right;    KNEE ARTHROSCOPY W/ ACL RECONSTRUCTION  2006    left; Dr. Rock Alanis    MOUTH SURGERY      SKIN CANCER EXCISION       Current Outpatient Medications on File Prior to Visit   Medication Sig Dispense Refill    acyclovir (ZOVIRAX) 400 MG tablet TAKE 1 TABLET BY MOUTH TWICE A  tablet 2    fluorouraciL (EFUDEX) 5 % cream AAA bid x 2 weeks 40 g 1    gabapentin (NEURONTIN) 300 MG capsule Take 1 capsule (300 mg total) by mouth every evening. 30 capsule 11    sildenafil (REVATIO) 20 mg Tab Take 1 tablet (20 mg total) by mouth 3 (three) times daily. 90 tablet 3    varicella-zoster gE-AS01B, PF, (SHINGRIX) 50 mcg/0.5 mL injection Inject into the muscle. (Patient not taking: Reported on 11/5/2024) 1 each 1     No current facility-administered medications on file prior to visit.      Review of patient's allergies indicates:  No Known Allergies  Family History   Problem Relation Name Age of Onset    Colon cancer Neg Hx      Crohn's disease Neg Hx      Ulcerative colitis Neg Hx      Celiac disease Neg Hx       Social History     Socioeconomic History    Marital status:     Number of children: 2   Occupational History    Occupation: [employer]   Tobacco Use    Smoking status: Never    Smokeless tobacco: Never   Substance and Sexual Activity    Alcohol use: Yes     Alcohol/week: 1.0 standard drink of alcohol     Types: 1 Cans of beer per week     Comment: social    Drug use: No   Social History Narrative    : wife  of breast cancer        Exercise: regular        From [place], [place]       Review of Systems:  Constitutional:  Denies fever or chills   Eyes:  Denies change in visual acuity   HENT:  Denies nasal congestion or sore throat   Respiratory:  Denies cough or shortness of breath   Cardiovascular:  Denies chest pain or edema   GI:  Denies abdominal pain, nausea, vomiting, bloody stools or diarrhea   :  Denies dysuria   Integument:  Denies rash   Neurologic:  Denies headache, focal weakness or sensory changes   Endocrine:  Denies polyuria or polydipsia   Lymphatic:  Denies swollen glands   Psychiatric:  Denies depression or anxiety     Physical Exam:   Constitutional:  Well developed, well nourished, no acute distress, non-toxic appearance   Integument:  Well hydrated, no rash   Lymphatic:  No lymphadenopathy noted   Neurologic:  Alert & oriented x 3, CN 2-12 normal, normal motor function, normal sensory function, no focal deficits noted   Psychiatric:  Speech and behavior appropriate   Eyes: EOMI  Gi: abdomen soft    Bilateral Knee Exam    left Knee Exam     Tenderness   The patient is experiencing tenderness in the medial joint line.    Range of Motion   Extension: 5  Flexion: 100     Muscle Strength     The patient has normal knee strength.    Tests    Emmanuelle:  Medial - positive   Lachman:  Anterior - negative      Varus: negative  Valgus: negative  Patellar Apprehension: negative    Other   Erythema: absent  Sensation: normal  Pulse: present  Swelling: mild      right Knee Exam   right knee exam performed same as contralateral side and is normal.            X-rays were performed, personally reviewed by me and findings discussed with the patient.  3 views of the left knee show tricompartmental degenerative change most pronounced in the medial compartment with Kellgren 3 changes    Chronic pain of left knee  -     Iovera; Future  -     Ambulatory referral/consult to Physical Medicine Rehab; Future; Expected date: 11/12/2024    Primary osteoarthritis of left knee  -     CT Knee Without Contrast Left; Future; Expected date: 11/05/2024  -     Vital signs; Standing  -     Diet NPO; Standing  -     Place SMITA hose; Standing  -     Place sequential compression device; Standing  -     Case Request Operating Room: ROBOTIC ARTHROPLASTY, KNEE, TOTAL  -     Place in Outpatient; Standing    Pre-op testing  -     CBC auto differential; Future; Expected date: 11/05/2024  -     Comprehensive metabolic panel; Future; Expected date: 11/05/2024    Other orders  -     sodium chloride 0.9% flush 10 mL  -     IP VTE LOW RISK PATIENT; Standing  -     tranexamic acid (CYKLOKAPRON) 1,000 mg in 0.9% NaCl 100 mL IVPB (MB+)  -     cefazolin (ANCEF) 2 gram in dextrose 5% 50 mL IVPB (premix)            I had a long discussion with the patient regarding the benefits and risks of left TKA. They voiced understanding and wish to proceed with surgery. Consents signed in clinic.

## 2024-12-02 ENCOUNTER — PATIENT MESSAGE (OUTPATIENT)
Dept: ORTHOPEDICS | Facility: CLINIC | Age: 64
End: 2024-12-02
Payer: COMMERCIAL

## 2024-12-16 PROBLEM — Z73.6 LIMITATION OF ACTIVITY DUE TO DISABILITY: Status: ACTIVE | Noted: 2024-12-16

## 2024-12-16 PROBLEM — M17.12 PRIMARY OSTEOARTHRITIS OF LEFT KNEE: Status: ACTIVE | Noted: 2024-12-16

## 2024-12-23 ENCOUNTER — TELEPHONE (OUTPATIENT)
Dept: ORTHOPEDICS | Facility: CLINIC | Age: 64
End: 2024-12-23
Payer: COMMERCIAL

## 2024-12-23 ENCOUNTER — PATIENT MESSAGE (OUTPATIENT)
Dept: ORTHOPEDICS | Facility: CLINIC | Age: 64
End: 2024-12-23
Payer: COMMERCIAL

## 2024-12-23 DIAGNOSIS — M17.12 PRIMARY OSTEOARTHRITIS OF LEFT KNEE: Primary | ICD-10-CM

## 2024-12-23 DIAGNOSIS — M17.12 PRIMARY OSTEOARTHRITIS OF LEFT KNEE: ICD-10-CM

## 2024-12-23 RX ORDER — METHOCARBAMOL 750 MG/1
750 TABLET, FILM COATED ORAL 4 TIMES DAILY PRN
Qty: 4 TABLET | Refills: 3 | Status: SHIPPED | OUTPATIENT
Start: 2024-12-23

## 2024-12-23 RX ORDER — TRAMADOL HYDROCHLORIDE 50 MG/1
50 TABLET ORAL EVERY 6 HOURS PRN
Qty: 28 TABLET | Refills: 0 | Status: SHIPPED | OUTPATIENT
Start: 2024-12-23

## 2025-01-02 ENCOUNTER — HOSPITAL ENCOUNTER (OUTPATIENT)
Dept: RADIOLOGY | Facility: HOSPITAL | Age: 65
Discharge: HOME | End: 2025-01-02
Attending: ORTHOPAEDIC SURGERY
Payer: COMMERCIAL

## 2025-01-02 ENCOUNTER — OFFICE VISIT (OUTPATIENT)
Dept: ORTHOPEDICS | Facility: CLINIC | Age: 65
End: 2025-01-02
Payer: COMMERCIAL

## 2025-01-02 DIAGNOSIS — M17.12 PRIMARY OSTEOARTHRITIS OF LEFT KNEE: ICD-10-CM

## 2025-01-02 PROCEDURE — 73562 X-RAY EXAM OF KNEE 3: CPT | Mod: TC,PO,LT

## 2025-01-02 PROCEDURE — 73560 X-RAY EXAM OF KNEE 1 OR 2: CPT | Mod: 26,59,RT, | Performed by: RADIOLOGY

## 2025-01-02 PROCEDURE — 1160F RVW MEDS BY RX/DR IN RCRD: CPT | Mod: CPTII,S$GLB,, | Performed by: ORTHOPAEDIC SURGERY

## 2025-01-02 PROCEDURE — 73562 X-RAY EXAM OF KNEE 3: CPT | Mod: 26,LT,, | Performed by: RADIOLOGY

## 2025-01-02 PROCEDURE — 99999 PR PBB SHADOW E&M-EST. PATIENT-LVL II: CPT | Mod: PBBFAC,,, | Performed by: ORTHOPAEDIC SURGERY

## 2025-01-02 PROCEDURE — 99024 POSTOP FOLLOW-UP VISIT: CPT | Mod: S$GLB,,, | Performed by: ORTHOPAEDIC SURGERY

## 2025-01-02 PROCEDURE — 1159F MED LIST DOCD IN RCRD: CPT | Mod: CPTII,S$GLB,, | Performed by: ORTHOPAEDIC SURGERY

## 2025-01-02 RX ORDER — METHOCARBAMOL 750 MG/1
750 TABLET, FILM COATED ORAL 4 TIMES DAILY PRN
Qty: 44 TABLET | Refills: 3 | Status: SHIPPED | OUTPATIENT
Start: 2025-01-02

## 2025-01-10 NOTE — PROGRESS NOTES
Chief Complaint   Patient presents with    Left Knee - Post-op Evaluation       HPI:  64 y.o. male returns to clinic today status post left total knee arthroplasty 2 weeks ago. Pain is mild. Patient is compliant most of the time with restrictions.     left knee: ROM 5-95. Overall normal alignment. Incision healed. No erythema or fluctuance. Stable to stress. Skin intact. Compartments soft. NVI distally.     X-rays were performed today, personally reviewed by me and findings discussed with the patient.  3 views of the left knee show implants intact in good position    Primary osteoarthritis of left knee  -     methocarbamoL (ROBAXIN) 750 MG Tab; Take 1 tablet (750 mg total) by mouth 4 (four) times daily as needed (muscle spasms).  Dispense: 44 tablet; Refill: 3        Staples out. Begin PT. Encourage ROM. RTC 6 weeks

## 2025-01-30 ENCOUNTER — PATIENT MESSAGE (OUTPATIENT)
Dept: FAMILY MEDICINE | Facility: CLINIC | Age: 65
End: 2025-01-30
Payer: COMMERCIAL

## 2025-01-30 DIAGNOSIS — N52.9 ERECTILE DYSFUNCTION, UNSPECIFIED ERECTILE DYSFUNCTION TYPE: ICD-10-CM

## 2025-01-30 RX ORDER — SILDENAFIL CITRATE 20 MG/1
20 TABLET ORAL 3 TIMES DAILY PRN
Qty: 90 TABLET | Refills: 3 | Status: SHIPPED | OUTPATIENT
Start: 2025-01-30

## 2025-01-31 NOTE — TELEPHONE ENCOUNTER
Refill Decision Note   Efra Corey  is requesting a refill authorization.  Brief Assessment and Rationale for Refill:  Approve     Medication Therapy Plan:        Comments:     Note composed:6:27 PM 01/30/2025

## 2025-01-31 NOTE — TELEPHONE ENCOUNTER
No care due was identified.  [facility] Embedded Care Due Messages. Reference number: [id number].   1/30/2025 6:06:14 PM CST

## 2025-02-07 DIAGNOSIS — M25.562 LEFT KNEE PAIN, UNSPECIFIED CHRONICITY: Primary | ICD-10-CM

## 2025-02-13 ENCOUNTER — OFFICE VISIT (OUTPATIENT)
Dept: ORTHOPEDICS | Facility: CLINIC | Age: 65
End: 2025-02-13
Payer: COMMERCIAL

## 2025-02-13 ENCOUNTER — HOSPITAL ENCOUNTER (OUTPATIENT)
Dept: RADIOLOGY | Facility: HOSPITAL | Age: 65
Discharge: HOME | End: 2025-02-13
Attending: NURSE PRACTITIONER
Payer: COMMERCIAL

## 2025-02-13 VITALS
WEIGHT: 195.13 LBS | HEART RATE: 74 BPM | SYSTOLIC BLOOD PRESSURE: 150 MMHG | BODY MASS INDEX: 31.36 KG/M2 | DIASTOLIC BLOOD PRESSURE: 80 MMHG | HEIGHT: 66 IN

## 2025-02-13 DIAGNOSIS — G89.18 POST-OP PAIN: ICD-10-CM

## 2025-02-13 DIAGNOSIS — Z96.652 S/P TOTAL KNEE ARTHROPLASTY, LEFT: Primary | ICD-10-CM

## 2025-02-13 DIAGNOSIS — M25.562 LEFT KNEE PAIN, UNSPECIFIED CHRONICITY: ICD-10-CM

## 2025-02-13 DIAGNOSIS — M17.12 PRIMARY OSTEOARTHRITIS OF LEFT KNEE: ICD-10-CM

## 2025-02-13 PROCEDURE — 99999 PR PBB SHADOW E&M-EST. PATIENT-LVL III: CPT | Mod: PBBFAC,,, | Performed by: NURSE PRACTITIONER

## 2025-02-13 PROCEDURE — 3077F SYST BP >= 140 MM HG: CPT | Mod: CPTII,S$GLB,, | Performed by: NURSE PRACTITIONER

## 2025-02-13 PROCEDURE — 3079F DIAST BP 80-89 MM HG: CPT | Mod: CPTII,S$GLB,, | Performed by: NURSE PRACTITIONER

## 2025-02-13 PROCEDURE — 73562 X-RAY EXAM OF KNEE 3: CPT | Mod: TC,PO,LT

## 2025-02-13 PROCEDURE — 1160F RVW MEDS BY RX/DR IN RCRD: CPT | Mod: CPTII,S$GLB,, | Performed by: NURSE PRACTITIONER

## 2025-02-13 PROCEDURE — 73560 X-RAY EXAM OF KNEE 1 OR 2: CPT | Mod: 26,59,RT, | Performed by: RADIOLOGY

## 2025-02-13 PROCEDURE — 73562 X-RAY EXAM OF KNEE 3: CPT | Mod: 26,LT,, | Performed by: RADIOLOGY

## 2025-02-13 PROCEDURE — 99024 POSTOP FOLLOW-UP VISIT: CPT | Mod: S$GLB,,, | Performed by: NURSE PRACTITIONER

## 2025-02-13 PROCEDURE — 1159F MED LIST DOCD IN RCRD: CPT | Mod: CPTII,S$GLB,, | Performed by: NURSE PRACTITIONER

## 2025-02-13 RX ORDER — TRAMADOL HYDROCHLORIDE 50 MG/1
50 TABLET ORAL EVERY 6 HOURS PRN
Qty: 28 TABLET | Refills: 0 | Status: SHIPPED | OUTPATIENT
Start: 2025-02-13

## 2025-02-13 NOTE — PROGRESS NOTES
Chief Complaint   Patient presents with    Left Knee - Pain       HPI:  65 y.o. returns to clinic today status post  left total knee arthroplasty 8 weeks ago. Pain is intermittent. Patient is compliant most of the time with restrictions.     Left knee  0-120  Incision healed. No redness or drainage.     X-rays were performed today, personally reviewed by me and findings discussed with the patient.  3 views of the left knee show implants intact. No evidence of loosening.       S/P total knee arthroplasty, left  -     traMADoL (ULTRAM) 50 mg tablet; Take 1 tablet (50 mg total) by mouth every 6 (six) hours as needed for Pain.  Dispense: 28 tablet; Refill: 0    Primary osteoarthritis of left knee    Post-op pain  -     traMADoL (ULTRAM) 50 mg tablet; Take 1 tablet (50 mg total) by mouth every 6 (six) hours as needed for Pain.  Dispense: 28 tablet; Refill: 0    Continue physical therapy as tolerated.   Return to clinic in 2 months.

## 2025-03-13 ENCOUNTER — PATIENT MESSAGE (OUTPATIENT)
Dept: PODIATRY | Facility: CLINIC | Age: 65
End: 2025-03-13
Payer: COMMERCIAL

## 2025-04-09 DIAGNOSIS — M17.12 PRIMARY OSTEOARTHRITIS OF LEFT KNEE: Primary | ICD-10-CM

## 2025-04-15 ENCOUNTER — OFFICE VISIT (OUTPATIENT)
Dept: ORTHOPEDICS | Facility: CLINIC | Age: 65
End: 2025-04-15
Payer: COMMERCIAL

## 2025-04-15 ENCOUNTER — HOSPITAL ENCOUNTER (OUTPATIENT)
Dept: RADIOLOGY | Facility: HOSPITAL | Age: 65
Discharge: HOME | End: 2025-04-15
Attending: ORTHOPAEDIC SURGERY
Payer: COMMERCIAL

## 2025-04-15 DIAGNOSIS — M17.12 PRIMARY OSTEOARTHRITIS OF LEFT KNEE: ICD-10-CM

## 2025-04-15 DIAGNOSIS — Z96.652 S/P TOTAL KNEE ARTHROPLASTY, LEFT: Primary | ICD-10-CM

## 2025-04-15 PROCEDURE — 73562 X-RAY EXAM OF KNEE 3: CPT | Mod: 26,LT,, | Performed by: RADIOLOGY

## 2025-04-15 PROCEDURE — 99214 OFFICE O/P EST MOD 30 MIN: CPT | Mod: S$GLB,,, | Performed by: ORTHOPAEDIC SURGERY

## 2025-04-15 PROCEDURE — 73562 X-RAY EXAM OF KNEE 3: CPT | Mod: TC,PO,LT

## 2025-04-15 PROCEDURE — 73560 X-RAY EXAM OF KNEE 1 OR 2: CPT | Mod: 26,RT,, | Performed by: RADIOLOGY

## 2025-04-15 PROCEDURE — 1160F RVW MEDS BY RX/DR IN RCRD: CPT | Mod: CPTII,S$GLB,, | Performed by: ORTHOPAEDIC SURGERY

## 2025-04-15 PROCEDURE — 99999 PR PBB SHADOW E&M-EST. PATIENT-LVL III: CPT | Mod: PBBFAC,,, | Performed by: ORTHOPAEDIC SURGERY

## 2025-04-15 PROCEDURE — 1159F MED LIST DOCD IN RCRD: CPT | Mod: CPTII,S$GLB,, | Performed by: ORTHOPAEDIC SURGERY

## 2025-04-15 RX ORDER — METHOCARBAMOL 750 MG/1
750 TABLET, FILM COATED ORAL 4 TIMES DAILY PRN
Qty: 44 TABLET | Refills: 3 | Status: SHIPPED | OUTPATIENT
Start: 2025-04-15

## 2025-04-15 NOTE — PROGRESS NOTES
Chief Complaint   Patient presents with    Left Knee - Pain       HPI:   This is a 65 y.o. who returns today status post left TKA 4 months ago. Patient has been FWB.  Pain is dull. No numbness or tingling. No associated signs or symptoms.    Past Medical History:   Diagnosis Date    Basal cell carcinoma 2018    R neck, R nasal dorsum, L ala    Colon polyp     Diverticulosis     DJD (degenerative joint disease) of knee      Past Surgical History:   Procedure Laterality Date    COLONOSCOPY      COLONOSCOPY N/A 11/05/2018    Procedure: COLONOSCOPY;  Surgeon: Efra Paul MD;  Location: [facility];  Service: Endoscopy;  Laterality: N/A; 1 colon polyp removed, repeat in 5 years for surveillance; biopsy: Sessile serrated polyp    COLONOSCOPY N/A 12/20/2023    Procedure: COLONOSCOPY;  Surgeon: Efra Paul MD;  Location: [facility] ENDO;  Service: Endoscopy;  Laterality: N/A;    FUSION OF METATARSOPHALANGEAL JOINT Right 07/06/2021    Procedure: FUSION, MTP JOINT;  Surgeon: Lb Schrader DPM;  Location: [facility] OR;  Service: Podiatry;  Laterality: Right;    HARDWARE REMOVAL Left 12/16/2024    Procedure: REMOVAL, HARDWARE;  Surgeon: Lester Cedeno MD;  Location: [facility] OR;  Service: Orthopedics;  Laterality: Left;    KNEE ARTHROSCOPY W/ ACL RECONSTRUCTION  2006    left; Dr. Rock Alanis    MOUTH SURGERY      ROBOTIC ARTHROPLASTY, KNEE Left 12/16/2024    Procedure: ROBOTIC ARTHROPLASTY, KNEE, TOTAL-[place];  Surgeon: Lester Cedeno MD;  Location: [facility] OR;  Service: Orthopedics;  Laterality: Left;    SKIN CANCER EXCISION       Medications Ordered Prior to Encounter[1]  Review of patient's allergies indicates:  No Known Allergies  Family History   Problem Relation Name Age of Onset    Colon cancer Neg Hx      Crohn's disease Neg Hx      Ulcerative colitis Neg Hx      Celiac disease Neg Hx       Social History[2]    Review of Systems:  Constitutional:  Denies fever or chills   Eyes:  Denies change in visual acuity    HENT:  Denies nasal congestion or sore throat   Respiratory:  Denies cough or shortness of breath   Cardiovascular:  Denies chest pain or edema   GI:  Denies abdominal pain, nausea, vomiting, bloody stools or diarrhea   :  Denies dysuria   Integument:  Denies rash   Neurologic:  Denies headache, focal weakness or sensory changes   Endocrine:  Denies polyuria or polydipsia   Lymphatic:  Denies swollen glands   Psychiatric:  Denies depression or anxiety     Physical Exam:   Constitutional:  Well developed, well nourished, no acute distress, non-toxic appearance   Integument:  Well hydrated, no rash   Lymphatic:  No lymphadenopathy noted   Neurologic:  Alert & oriented x 3, CN 2-12 normal, normal motor function, normal sensory function, no focal deficits noted   Psychiatric:  Speech and behavior appropriate   Gi: abdomen soft  Eyes: EOMI    R knee  Exam performed same as contralateral side and is normal  L knee  ROM 0-125. Stable to stress. Skin intact. Compartments soft. NVI distally.     X-rays were performed today, personally reviewed by me and findings discussed with the patient.  3 views of the left knee show implants intact in good position      S/P total knee arthroplasty, left    Other orders  -     methocarbamoL (ROBAXIN) 750 MG Tab; Take 1 tablet (750 mg total) by mouth 4 (four) times daily as needed.  Dispense: 44 tablet; Refill: 3        Continue activity as tolerated. RTC 1 year.            [1]   Current Outpatient Medications on File Prior to Visit   Medication Sig Dispense Refill    acetaminophen (TYLENOL) 500 MG tablet Take 2 tablets (1,000 mg total) by mouth every 8 (eight) hours. 90 tablet 0    aspirin (ECOTRIN) 325 MG EC tablet Take 1 tablet (325 mg total) by mouth once daily. 30 tablet 0    aspirin-acetaminophen-caffeine 250-250-65 mg (EXCEDRIN MIGRAINE) 250-250-65 mg per tablet Take 1 tablet by mouth every 6 (six) hours as needed for Pain.      fluorouraciL (EFUDEX) 5 % cream AAA bid x 2 weeks  40 g 1    gabapentin (NEURONTIN) 300 MG capsule Take 1 capsule (300 mg total) by mouth every evening. (Patient taking differently: Take 300 mg by mouth nightly as needed (nerve pain).) 30 capsule 11    ibuprofen (ADVIL,MOTRIN) 800 MG tablet Take 1 tablet (800 mg total) by mouth 3 (three) times daily. 90 tablet 0    oxyCODONE (ROXICODONE) 5 MG immediate release tablet Take 1 tablet (5 mg total) by mouth every 4 (four) hours as needed for Pain. 22 tablet 0    sildenafil (REVATIO) 20 mg Tab Take 1 tablet (20 mg total) by mouth 3 (three) times daily as needed (erectile dysfunction). 90 tablet 3    traMADoL (ULTRAM) 50 mg tablet Take 1 tablet (50 mg total) by mouth every 6 (six) hours as needed for Pain. 28 tablet 0    [DISCONTINUED] methocarbamoL (ROBAXIN) 750 MG Tab Take 1 tablet (750 mg total) by mouth 4 (four) times daily as needed (muscle spasms). 44 tablet 3     Current Facility-Administered Medications on File Prior to Visit   Medication Dose Route Frequency Provider Last Rate Last Admin    sodium chloride 0.9% flush 10 mL  10 mL Intravenous Q6H PRN Lester Cedeno MD       [2]   Social History  Socioeconomic History    Marital status:     Number of children: 2   Occupational History    Occupation: retired [employer]   Tobacco Use    Smoking status: Never     Passive exposure: Never    Smokeless tobacco: Never   Substance and Sexual Activity    Alcohol use: Yes     Alcohol/week: 1.0 standard drink of alcohol     Types: 1 Cans of beer per week     Comment: social    Drug use: No   Social History Narrative    : wife  of breast cancer        Exercise: regular        From [place], [place]     Social Drivers of Health     Financial Resource Strain: Low Risk  (2025)    Overall Financial Resource Strain (CARDIA)     Difficulty of Paying Living Expenses: Not hard at all   Food Insecurity: No Food Insecurity (2025)    Hunger Vital Sign     Worried About Running Out of Food in the Last  Year: Never true     Ran Out of Food in the Last Year: Never true   Transportation Needs: No Transportation Needs (4/14/2025)    PRAPARE - Transportation     Lack of Transportation (Medical): No     Lack of Transportation (Non-Medical): No   Physical Activity: Sufficiently Active (4/14/2025)    Exercise Vital Sign     Days of Exercise per Week: 6 days     Minutes of Exercise per Session: 30 min   Stress: No Stress Concern Present (4/14/2025)    [place] [place] [employer] - Occupational Stress Questionnaire     Feeling of Stress : Not at all   Housing Stability: Low Risk  (4/14/2025)    Housing Stability Vital Sign     Unable to Pay for Housing in the Last Year: No     Number of Times Moved in the Last Year: 0     Homeless in the Last Year: No

## 2025-04-25 ENCOUNTER — OFFICE VISIT (OUTPATIENT)
Dept: ORTHOPEDICS | Facility: CLINIC | Age: 65
End: 2025-04-25
Payer: COMMERCIAL

## 2025-04-25 DIAGNOSIS — G56.03 CARPAL TUNNEL SYNDROME, BILATERAL: Primary | ICD-10-CM

## 2025-04-25 PROCEDURE — 99999 PR PBB SHADOW E&M-EST. PATIENT-LVL III: CPT | Mod: PBBFAC,,, | Performed by: ORTHOPAEDIC SURGERY

## 2025-04-25 NOTE — PATIENT INSTRUCTIONS
Surgery Instructions:     Your surgery is scheduled on 5/8/25 at the [facility]: [address], [place].    The pre-op department will be in contact with you prior to your procedure to review medications and instructions.       Nothing to eat or drink after midnight prior to day of surgery.    You should STOP taking any blood thinners 5 days prior to surgery.     Please obtain medical and cardiac clearance as advised prior to surgery. All preoperative testing should be done as soon as possible as it may be needed to obtain clearance.    The surgery center will contact you the day prior to surgery to advise you of your arrival time for surgery.     Your post op appointment is scheduled on 5/23/25 @ 11:00.

## 2025-04-25 NOTE — PROGRESS NOTES
4/25/2025    Chief Complaint:  Chief Complaint   Patient presents with    Left Hand - Numbness, Pain       HPI:  Efra Corey is a 65 y.o. male, who presents to clinic today has a history of bilateral carpal tunnel syndrome.  We have attempted to treat this conservatively.  We have tried splinting and injections.  He continues to have recurrence of his symptoms.  He states that currently his left hand is giving him a lot of problems keeping him up at night and going numb throughout the day with activity.  He is here today to discuss further treatment    PMHX:  Past Medical History:   Diagnosis Date    Basal cell carcinoma 2018    R neck, R nasal dorsum, L ala    Colon polyp     Diverticulosis     DJD (degenerative joint disease) of knee        PSHX:  Past Surgical History:   Procedure Laterality Date    COLONOSCOPY      COLONOSCOPY N/A 11/05/2018    Procedure: COLONOSCOPY;  Surgeon: Efra Paul MD;  Location: [facility];  Service: Endoscopy;  Laterality: N/A; 1 colon polyp removed, repeat in 5 years for surveillance; biopsy: Sessile serrated polyp    COLONOSCOPY N/A 12/20/2023    Procedure: COLONOSCOPY;  Surgeon: Efra Paul MD;  Location: [facility];  Service: Endoscopy;  Laterality: N/A;    FUSION OF METATARSOPHALANGEAL JOINT Right 07/06/2021    Procedure: FUSION, MTP JOINT;  Surgeon: Lb Schrader DPM;  Location: [facility] OR;  Service: Podiatry;  Laterality: Right;    HARDWARE REMOVAL Left 12/16/2024    Procedure: REMOVAL, HARDWARE;  Surgeon: Lester Cedeno MD;  Location: [facility] OR;  Service: Orthopedics;  Laterality: Left;    KNEE ARTHROSCOPY W/ ACL RECONSTRUCTION  2006    left; Dr. Rock Alanis    MOUTH SURGERY      ROBOTIC ARTHROPLASTY, KNEE Left 12/16/2024    Procedure: ROBOTIC ARTHROPLASTY, KNEE, TOTAL-Paco;  Surgeon: Lester Cedeno MD;  Location: [facility] OR;  Service: Orthopedics;  Laterality: Left;    SKIN CANCER EXCISION         FMHX:  Family History   Problem Relation Name Age of Onset     Colon cancer Neg Hx      Crohn's disease Neg Hx      Ulcerative colitis Neg Hx      Celiac disease Neg Hx         SOCHX:  Social History     Tobacco Use    Smoking status: Never     Passive exposure: Never    Smokeless tobacco: Never   Substance Use Topics    Alcohol use: Yes     Alcohol/week: 1.0 standard drink of alcohol     Types: 1 Cans of beer per week     Comment: social       ALLERGIES:  Patient has no known allergies.    CURRENT MEDICATIONS:  Medications Ordered Prior to Encounter[1]    REVIEW OF SYSTEMS:  ROS    GENERAL PHYSICAL EXAM:   There were no vitals taken for this visit.   GEN: well developed, well nourished, no acute distress   HENT: Normocephalic, atraumatic   EYES: No discharge, conjunctiva normal   NECK: Supple, non-tender   PULM: No wheezing, no respiratory distress   CV: RRR   ABD: Soft, non-tender    ORTHO EXAM:   Examination of the left hand and wrist reveals that there is no edema.  There are no skin changes.  He does have decreased sensation in the median distribution.  Has positive Tinel's and positive Durkan's.  Has 2+ radial pulse    RADIOLOGY:   None    ASSESSMENT:   Bilateral carpal tunnel syndrome    PLAN:  1. I have discussed treatment options.  I did discuss the possibility of carpal tunnel release.  I have discussed the risks and benefits of the procedure and consent has been obtained     2.  Will proceed with left carpal tunnel release under local anesthesia     3.  Will follow up with me 2 weeks after the surgery          [1]   Current Outpatient Medications on File Prior to Visit   Medication Sig Dispense Refill    acetaminophen (TYLENOL) 500 MG tablet Take 2 tablets (1,000 mg total) by mouth every 8 (eight) hours. 90 tablet 0    aspirin (ECOTRIN) 325 MG EC tablet Take 1 tablet (325 mg total) by mouth once daily. 30 tablet 0    aspirin-acetaminophen-caffeine 250-250-65 mg (EXCEDRIN MIGRAINE) 250-250-65 mg per tablet Take 1 tablet by mouth every 6 (six) hours as needed for  Pain.      fluorouraciL (EFUDEX) 5 % cream AAA bid x 2 weeks 40 g 1    gabapentin (NEURONTIN) 300 MG capsule Take 1 capsule (300 mg total) by mouth every evening. (Patient taking differently: Take 300 mg by mouth nightly as needed (nerve pain).) 30 capsule 11    ibuprofen (ADVIL,MOTRIN) 800 MG tablet Take 1 tablet (800 mg total) by mouth 3 (three) times daily. 90 tablet 0    methocarbamoL (ROBAXIN) 750 MG Tab Take 1 tablet (750 mg total) by mouth 4 (four) times daily as needed. 44 tablet 3    oxyCODONE (ROXICODONE) 5 MG immediate release tablet Take 1 tablet (5 mg total) by mouth every 4 (four) hours as needed for Pain. 22 tablet 0    sildenafil (REVATIO) 20 mg Tab Take 1 tablet (20 mg total) by mouth 3 (three) times daily as needed (erectile dysfunction). 90 tablet 3    traMADoL (ULTRAM) 50 mg tablet Take 1 tablet (50 mg total) by mouth every 6 (six) hours as needed for Pain. 28 tablet 0     Current Facility-Administered Medications on File Prior to Visit   Medication Dose Route Frequency Provider Last Rate Last Admin    sodium chloride 0.9% flush 10 mL  10 mL Intravenous Q6H PRN Lester Cedeno MD

## 2025-04-28 DIAGNOSIS — G56.02 CARPAL TUNNEL SYNDROME OF LEFT WRIST: Primary | ICD-10-CM

## 2025-04-28 RX ORDER — CEFAZOLIN SODIUM 2 G/50ML
2 SOLUTION INTRAVENOUS
OUTPATIENT
Start: 2025-04-28

## 2025-05-08 PROBLEM — G56.02 CARPAL TUNNEL SYNDROME OF LEFT WRIST: Status: ACTIVE | Noted: 2025-05-08

## 2025-05-23 ENCOUNTER — OFFICE VISIT (OUTPATIENT)
Dept: ORTHOPEDICS | Facility: CLINIC | Age: 65
End: 2025-05-23
Payer: COMMERCIAL

## 2025-05-23 DIAGNOSIS — G56.02 CARPAL TUNNEL SYNDROME OF LEFT WRIST: Primary | ICD-10-CM

## 2025-05-23 PROCEDURE — 99999 PR PBB SHADOW E&M-EST. PATIENT-LVL I: CPT | Mod: PBBFAC,,, | Performed by: ORTHOPAEDIC SURGERY

## 2025-05-23 NOTE — PROGRESS NOTES
Efra Corey is a 65 y.o. male who is approximately 2 weeks status post left carpal tunnel release. He is doing very well. He states that the majority of carpal tunnel symptoms are improved or resolved.    Physical exam: Examination of the left hand reveals that the incision is healing well. There is no significant edema,  erythema or drainage. Sensation is grossly intact median radial and ulnar distributions. Motor function of the thenar musculature is intact. Capillary refill less than 2 seconds in all of the digits. The Patient is able to make a full composite fist and fully extend the fingers without significant pain.    Assessment: Status post left carpal tunnel release    Plan:    1. Sutures were removed today and Steri-Strips are placed    2. Can begin hand washing in running water tomorrow    3. Continue a 2-3 pound weight limit to the arm and hand    4. Follow up with me in 2 weeks for repeat evaluation

## 2025-06-16 ENCOUNTER — OFFICE VISIT (OUTPATIENT)
Dept: ORTHOPEDICS | Facility: CLINIC | Age: 65
End: 2025-06-16
Payer: COMMERCIAL

## 2025-06-16 DIAGNOSIS — G56.03 CARPAL TUNNEL SYNDROME, BILATERAL: Primary | ICD-10-CM

## 2025-06-16 PROCEDURE — 99213 OFFICE O/P EST LOW 20 MIN: CPT | Mod: 24,,, | Performed by: ORTHOPAEDIC SURGERY

## 2025-06-16 PROCEDURE — 1159F MED LIST DOCD IN RCRD: CPT | Mod: CPTII,S$GLB,, | Performed by: ORTHOPAEDIC SURGERY

## 2025-06-16 PROCEDURE — 99999 PR PBB SHADOW E&M-EST. PATIENT-LVL II: CPT | Mod: PBBFAC,,, | Performed by: ORTHOPAEDIC SURGERY

## 2025-06-16 PROCEDURE — 99024 POSTOP FOLLOW-UP VISIT: CPT | Mod: S$GLB,,, | Performed by: ORTHOPAEDIC SURGERY

## 2025-06-16 NOTE — H&P (VIEW-ONLY)
6/16/2025    Chief Complaint:  Chief Complaint   Patient presents with    Left Hand - Numbness, Pain, Post-op Evaluation       HPI:  Efra Corey is a 65 y.o. male, who presents to clinic today has a history of bilateral carpal tunnel syndrome.  He underwent a left carpal tunnel release approximately 5 weeks ago and did well.  States that his left hand is improved.  He is now complaining of the consistent right hand numbness and tingling.  He would like to discuss the possibility of a right carpal tunnel release    PMHX:  Past Medical History:   Diagnosis Date    Basal cell carcinoma 2018    R neck, R nasal dorsum, L ala    Colon polyp     Diverticulosis     DJD (degenerative joint disease) of knee        PSHX:  Past Surgical History:   Procedure Laterality Date    CARPAL TUNNEL RELEASE Left 5/8/2025    Procedure: Left carpal tunnel release;  Surgeon: Poli Wilkins MD;  Location: [facility] OR;  Service: Orthopedics;  Laterality: Left;    COLONOSCOPY      COLONOSCOPY N/A 11/05/2018    Procedure: COLONOSCOPY;  Surgeon: Efra Paul MD;  Location: [facility];  Service: Endoscopy;  Laterality: N/A; 1 colon polyp removed, repeat in 5 years for surveillance; biopsy: Sessile serrated polyp    COLONOSCOPY N/A 12/20/2023    Procedure: COLONOSCOPY;  Surgeon: Efra Paul MD;  Location: [facility];  Service: Endoscopy;  Laterality: N/A;    FUSION OF METATARSOPHALANGEAL JOINT Right 07/06/2021    Procedure: FUSION, MTP JOINT;  Surgeon: Lb Schrader DPM;  Location: [facility];  Service: Podiatry;  Laterality: Right;    HARDWARE REMOVAL Left 12/16/2024    Procedure: REMOVAL, HARDWARE;  Surgeon: Lester Cedeno MD;  Location: [facility];  Service: Orthopedics;  Laterality: Left;    KNEE ARTHROSCOPY W/ ACL RECONSTRUCTION  2006    left; Dr. Rock Alanis    MOUTH SURGERY      ROBOTIC ARTHROPLASTY, KNEE Left 12/16/2024    Procedure: ROBOTIC ARTHROPLASTY, KNEE, TOTAL-Paco;  Surgeon: Lester Cedeno MD;  Location:  [facility] OR;  Service: Orthopedics;  Laterality: Left;    SKIN CANCER EXCISION         FMHX:  Family History   Problem Relation Name Age of Onset    Colon cancer Neg Hx      Crohn's disease Neg Hx      Ulcerative colitis Neg Hx      Celiac disease Neg Hx         SOCHX:  Social History     Tobacco Use    Smoking status: Never     Passive exposure: Never    Smokeless tobacco: Never   Substance Use Topics    Alcohol use: Yes     Alcohol/week: 1.0 standard drink of alcohol     Types: 1 Cans of beer per week     Comment: social       ALLERGIES:  Patient has no known allergies.    CURRENT MEDICATIONS:  Medications Ordered Prior to Encounter[1]    REVIEW OF SYSTEMS:  ROS    GENERAL PHYSICAL EXAM:   There were no vitals taken for this visit.   GEN: well developed, well nourished, no acute distress   HENT: Normocephalic, atraumatic   EYES: No discharge, conjunctiva normal   NECK: Supple, non-tender   PULM: No wheezing, no respiratory distress   CV: RRR   ABD: Soft, non-tender    ORTHO EXAM:   Examination of the left hand and wrist reveals that the wound is well healed.  There is only mild tenderness directly over the region of the scar.  He is able to make a full composite fist and extend his fingers.  He does have sensation intact in all the digits     Examination of the right hand and wrist reveals that there is no edema.  There are no skin changes.  Sensation in the median distribution is grossly intact.  He does have a positive Tinel's and a positive Durkan's test.  Has a 2+ radial pulse    RADIOLOGY:   None    ASSESSMENT:   Bilateral carpal tunnel syndrome    PLAN:  1. He is allowed to increase activity to as tolerated with the left hand     2. We have reviewed the risks and benefits of carpal tunnel release on the right side.  Informed consent has been obtained     3. Will proceed with a right carpal tunnel release under local anesthesia    4.  He will follow up with me 2 weeks after the procedure           [1]   Current  Outpatient Medications on File Prior to Visit   Medication Sig Dispense Refill    acetaminophen (TYLENOL) 500 MG tablet Take 2 tablets (1,000 mg total) by mouth every 8 (eight) hours. 90 tablet 0    aspirin-acetaminophen-caffeine 250-250-65 mg (EXCEDRIN MIGRAINE) 250-250-65 mg per tablet Take 1 tablet by mouth every 6 (six) hours as needed for Pain.      fluorouraciL (EFUDEX) 5 % cream AAA bid x 2 weeks 40 g 1    gabapentin (NEURONTIN) 300 MG capsule Take 1 capsule (300 mg total) by mouth every evening. (Patient taking differently: Take 300 mg by mouth nightly as needed (nerve pain).) 30 capsule 11    methocarbamoL (ROBAXIN) 750 MG Tab Take 1 tablet (750 mg total) by mouth 4 (four) times daily as needed. 44 tablet 3    oxyCODONE (ROXICODONE) 5 MG immediate release tablet Take 1 tablet (5 mg total) by mouth every 4 (four) hours as needed for Pain. 5 tablet 0    sildenafil (REVATIO) 20 mg Tab Take 1 tablet (20 mg total) by mouth 3 (three) times daily as needed (erectile dysfunction). 90 tablet 3     Current Facility-Administered Medications on File Prior to Visit   Medication Dose Route Frequency Provider Last Rate Last Admin    sodium chloride 0.9% flush 10 mL  10 mL Intravenous Q6H PRN Lester Cedeno MD

## 2025-06-16 NOTE — PATIENT INSTRUCTIONS
Surgery Instructions:     Your surgery is scheduled on 07/08/2025 at the surgery center: [address] [facility], 1st floor, second entrance.    The pre-op department will be in contact with you prior to your procedure to review medications and instructions.       Nothing to eat or drink after midnight prior to day of surgery.    You should STOP taking any blood thinners 5 days prior to surgery.     If needed, please obtain medical and cardiac clearance as advised prior to surgery. All preoperative testing should be done as soon as possible as it may be needed to obtain clearance.    The surgery center will contact you the day prior to surgery to advise you of your arrival time for surgery.     Your post op appointment is scheduled on 07/23/2025.    *Please answer the post op Patient IQ questions. They will be sent via email or text message at baseline, 3 months, 6 months, 1 year and 2 years after your surgery*

## 2025-06-16 NOTE — PROGRESS NOTES
6/16/2025    Chief Complaint:  Chief Complaint   Patient presents with    Left Hand - Numbness, Pain, Post-op Evaluation       HPI:  Efra Corey is a 65 y.o. male, who presents to clinic today has a history of bilateral carpal tunnel syndrome.  He underwent a left carpal tunnel release approximately 5 weeks ago and did well.  States that his left hand is improved.  He is now complaining of the consistent right hand numbness and tingling.  He would like to discuss the possibility of a right carpal tunnel release    PMHX:  Past Medical History:   Diagnosis Date    Basal cell carcinoma 2018    R neck, R nasal dorsum, L ala    Colon polyp     Diverticulosis     DJD (degenerative joint disease) of knee        PSHX:  Past Surgical History:   Procedure Laterality Date    CARPAL TUNNEL RELEASE Left 5/8/2025    Procedure: Left carpal tunnel release;  Surgeon: Poli Wilkins MD;  Location: [facility] OR;  Service: Orthopedics;  Laterality: Left;    COLONOSCOPY      COLONOSCOPY N/A 11/05/2018    Procedure: COLONOSCOPY;  Surgeon: Efra Paul MD;  Location: [facility];  Service: Endoscopy;  Laterality: N/A; 1 colon polyp removed, repeat in 5 years for surveillance; biopsy: Sessile serrated polyp    COLONOSCOPY N/A 12/20/2023    Procedure: COLONOSCOPY;  Surgeon: Efra Paul MD;  Location: [facility];  Service: Endoscopy;  Laterality: N/A;    FUSION OF METATARSOPHALANGEAL JOINT Right 07/06/2021    Procedure: FUSION, MTP JOINT;  Surgeon: Lb Schrader DPM;  Location: [facility];  Service: Podiatry;  Laterality: Right;    HARDWARE REMOVAL Left 12/16/2024    Procedure: REMOVAL, HARDWARE;  Surgeon: Lester Cedeno MD;  [facility];  Service: Orthopedics;  Laterality: Left;    KNEE ARTHROSCOPY W/ ACL RECONSTRUCTION  2006    left; Dr. Rock Alanis    MOUTH SURGERY      ROBOTIC ARTHROPLASTY, KNEE Left 12/16/2024    Procedure: ROBOTIC ARTHROPLASTY, KNEE, TOTAL-Paco;  Surgeon: Lester Cedeno MD;  Location:  [facility] OR;  Service: Orthopedics;  Laterality: Left;    SKIN CANCER EXCISION         FMHX:  Family History   Problem Relation Name Age of Onset    Colon cancer Neg Hx      Crohn's disease Neg Hx      Ulcerative colitis Neg Hx      Celiac disease Neg Hx         SOCHX:  Social History     Tobacco Use    Smoking status: Never     Passive exposure: Never    Smokeless tobacco: Never   Substance Use Topics    Alcohol use: Yes     Alcohol/week: 1.0 standard drink of alcohol     Types: 1 Cans of beer per week     Comment: social       ALLERGIES:  Patient has no known allergies.    CURRENT MEDICATIONS:  Medications Ordered Prior to Encounter[1]    REVIEW OF SYSTEMS:  ROS    GENERAL PHYSICAL EXAM:   There were no vitals taken for this visit.   GEN: well developed, well nourished, no acute distress   HENT: Normocephalic, atraumatic   EYES: No discharge, conjunctiva normal   NECK: Supple, non-tender   PULM: No wheezing, no respiratory distress   CV: RRR   ABD: Soft, non-tender    ORTHO EXAM:   Examination of the left hand and wrist reveals that the wound is well healed.  There is only mild tenderness directly over the region of the scar.  He is able to make a full composite fist and extend his fingers.  He does have sensation intact in all the digits     Examination of the right hand and wrist reveals that there is no edema.  There are no skin changes.  Sensation in the median distribution is grossly intact.  He does have a positive Tinel's and a positive Durkan's test.  Has a 2+ radial pulse    RADIOLOGY:   None    ASSESSMENT:   Bilateral carpal tunnel syndrome    PLAN:  1. He is allowed to increase activity to as tolerated with the left hand     2. We have reviewed the risks and benefits of carpal tunnel release on the right side.  Informed consent has been obtained     3. Will proceed with a right carpal tunnel release under local anesthesia    4.  He will follow up with me 2 weeks after the procedure           [1]   Current  Outpatient Medications on File Prior to Visit   Medication Sig Dispense Refill    acetaminophen (TYLENOL) 500 MG tablet Take 2 tablets (1,000 mg total) by mouth every 8 (eight) hours. 90 tablet 0    aspirin-acetaminophen-caffeine 250-250-65 mg (EXCEDRIN MIGRAINE) 250-250-65 mg per tablet Take 1 tablet by mouth every 6 (six) hours as needed for Pain.      fluorouraciL (EFUDEX) 5 % cream AAA bid x 2 weeks 40 g 1    gabapentin (NEURONTIN) 300 MG capsule Take 1 capsule (300 mg total) by mouth every evening. (Patient taking differently: Take 300 mg by mouth nightly as needed (nerve pain).) 30 capsule 11    methocarbamoL (ROBAXIN) 750 MG Tab Take 1 tablet (750 mg total) by mouth 4 (four) times daily as needed. 44 tablet 3    oxyCODONE (ROXICODONE) 5 MG immediate release tablet Take 1 tablet (5 mg total) by mouth every 4 (four) hours as needed for Pain. 5 tablet 0    sildenafil (REVATIO) 20 mg Tab Take 1 tablet (20 mg total) by mouth 3 (three) times daily as needed (erectile dysfunction). 90 tablet 3     Current Facility-Administered Medications on File Prior to Visit   Medication Dose Route Frequency Provider Last Rate Last Admin    sodium chloride 0.9% flush 10 mL  10 mL Intravenous Q6H PRN Lester Cedeno MD

## 2025-06-17 DIAGNOSIS — G56.01 CARPAL TUNNEL SYNDROME OF RIGHT WRIST: Primary | ICD-10-CM

## 2025-06-17 RX ORDER — CEFAZOLIN SODIUM 2 G/50ML
2 SOLUTION INTRAVENOUS
OUTPATIENT
Start: 2025-06-17

## 2025-07-08 ENCOUNTER — HOSPITAL ENCOUNTER (OUTPATIENT)
Facility: HOSPITAL | Age: 65
Discharge: HOME | End: 2025-07-08
Attending: ORTHOPAEDIC SURGERY | Admitting: ORTHOPAEDIC SURGERY
Payer: COMMERCIAL

## 2025-07-08 VITALS
WEIGHT: 199 LBS | DIASTOLIC BLOOD PRESSURE: 84 MMHG | RESPIRATION RATE: 16 BRPM | SYSTOLIC BLOOD PRESSURE: 170 MMHG | OXYGEN SATURATION: 96 % | HEIGHT: 66 IN | TEMPERATURE: 97 F | HEART RATE: 55 BPM | BODY MASS INDEX: 31.98 KG/M2

## 2025-07-08 DIAGNOSIS — G56.01 CARPAL TUNNEL SYNDROME OF RIGHT WRIST: ICD-10-CM

## 2025-07-08 PROCEDURE — 36000706: Mod: PO | Performed by: ORTHOPAEDIC SURGERY

## 2025-07-08 PROCEDURE — 71000015 HC POSTOP RECOV 1ST HR: Mod: PO | Performed by: ORTHOPAEDIC SURGERY

## 2025-07-08 PROCEDURE — 64721 CARPAL TUNNEL SURGERY: CPT | Mod: 79,RT,, | Performed by: ORTHOPAEDIC SURGERY

## 2025-07-08 PROCEDURE — 63600175 PHARM REV CODE 636 W HCPCS: Mod: PO | Performed by: PHYSICIAN ASSISTANT

## 2025-07-08 PROCEDURE — 36000707: Mod: PO | Performed by: ORTHOPAEDIC SURGERY

## 2025-07-08 PROCEDURE — 63600175 PHARM REV CODE 636 W HCPCS: Mod: PO | Performed by: ORTHOPAEDIC SURGERY

## 2025-07-08 RX ORDER — LIDOCAINE HYDROCHLORIDE 10 MG/ML
INJECTION, SOLUTION EPIDURAL; INFILTRATION; INTRACAUDAL; PERINEURAL
Status: DISCONTINUED | OUTPATIENT
Start: 2025-07-08 | End: 2025-07-08 | Stop reason: HOSPADM

## 2025-07-08 RX ORDER — BUPIVACAINE HYDROCHLORIDE 2.5 MG/ML
INJECTION, SOLUTION EPIDURAL; INFILTRATION; INTRACAUDAL; PERINEURAL
Status: DISCONTINUED | OUTPATIENT
Start: 2025-07-08 | End: 2025-07-08 | Stop reason: HOSPADM

## 2025-07-08 RX ORDER — OXYCODONE HYDROCHLORIDE 5 MG/1
5 TABLET ORAL EVERY 4 HOURS PRN
Qty: 5 TABLET | Refills: 0 | Status: SHIPPED | OUTPATIENT
Start: 2025-07-08

## 2025-07-08 RX ORDER — CEFAZOLIN SODIUM 1 G/3ML
2 INJECTION, POWDER, FOR SOLUTION INTRAMUSCULAR; INTRAVENOUS
Status: COMPLETED | OUTPATIENT
Start: 2025-07-08 | End: 2025-07-08

## 2025-07-08 NOTE — DISCHARGE INSTRUCTIONS
Procedure: right carpal tunnel release    1. Please keep the dressing clean, dry, and in place. Do not take it off and do not get it wet.    2. Please keep the right arm and hand elevated for the 1st 24-48 hours to prevent swelling    3. Flexion and extension of the exposed fingers is encouraged, but do not attempt to push off or lift more than 1-2 pounds with right arm or hand    4. Please limit weightbearing to the right hand to 1-2 pounds. Light activity such as brushing your teeth, using a fork, or lifting a small drink is allowed starting today.    5. Pain medication has been prescribed. Please take them as necessary    6. If there are any questions or concerns please call Dr. Wilkins's office at [phone]    7.  Follow up with Dr. Wilkins in 2 weeks

## 2025-07-08 NOTE — OP NOTE
Efra Corey  1960    DATE OF SURGERY: 7/8/2025     PRE-OPERATIVE DIAGNOSIS: right Carpal Tunnel Syndrome    POST-OPERATIVE DIAGNOSIS: right Carpal Tunnel Syndrome     ANESTHESIA TYPE: Local    BLOOD LOSS:  Less than 10 cc    TOURNIQUET TIME:  8 minutes    SURGEON: Dr. Wilkins    ASSISTANT:  Jaci Quinn    PROCEDURE: right Carpal Tunnel Release    IMPLANTS: None    SPECIMENS: None    INDICATION:     Mr. Corey presented to my clinic with a history of right carpal tunnel symptoms. Conservative treatments were initially tried. The patient did have relief with attempts at conservative treatment however has had a return of symptoms. An EMG and nerve conduction study has been performed. That study has shown compression of the median nerve at the wrist consistent with carpal tunnel syndrome. A discussion of the risks and benefits of carpal tunnel release has been performed, and informed consent for the procedure has been obtained.    PROCEDURE IN DETAIL:     Mr. Corey was transported to the operating room and was placed supine on the operating room table. All appropriate points were padded. The right hand and arm was prepped and draped in the normal sterile fashion. Time out was called. The correct patient, correct operative site, correct procedure, antibiotic administration which consisted of 2 g of Ancef, and allergies to medications which are to Patient has no known allergies.  were reviewed. Time in was then called.     Attention was turned to right hand where a 3 cm incision was made in the palm of the hand. This was carried through the skin and subcutaneous tissues were dissected bluntly. The superficial palmar fascia was identified and was split in line with its fibers. The transverse carpal ligament was then identified and was incised for a distance of approximately 1 cm sharply.The median nerve was visualized and a carpal tunnel sled was placed below the transverse carpal ligament but above the median  nerve. Blunt dissection proximally over the transverse carpal ligament was performed and elevator was placed just above the transverse carpal ligament proximally. The ligament was identified. There were noted to be no penetrating nerve branches and at that point the carpal tunnel knife was used to incise the proximal transverse carpal ligament. Attention was then turned to the distal portion of the transverse carpal ligament. The carpal tunnel sled was again placed underneath the transverse carpal ligament but above the median nerve distally. Blunt dissection above the transverse carpal ligament distally was performed and an elevator was placed. The distal portion of the transverse carpal ligament was visualized and there were noted to be no penetrating branches of the nerve. At that point, tenotomy scissors were used to transect the distal portion of the transverse carpal ligament under direct visualization. The median nerve was then visualized. There were noted to be no specific lesions of the nerve and all of its branches were noted to be intact. The wound was then irrigated copiously. The tourniquet was let down and meticulous hemostasis was obtained with bipolar cautery. The wound was closed with 4-0 nylon suture superficially. The wound was then dressed with Xeroform, 4 x 4's, cast padding and a 3 inch Ace wrap was placed.      The patient was stable in the operating room and was transported to the recovery room in stable condition. All lap, needle, sponge, and equipment counts were correct at the end of the case.    POST-OPERATIVE PLAN:     The patient will keep a soft dressing in place for two weeks at which time he will followup with me. The dressing will be taken down, and the sutures will be removed at that time. He is not to get the dressing wet or to take it off. He will have a 2 pound weight limit of the left upper extremity for a total of 4 weeks.

## 2025-07-08 NOTE — DISCHARGE SUMMARY
[facility] - Surgery  Discharge Note  Short Stay    Procedure(s) (LRB):  Right carpal tunnel release (Right)      OUTCOME: Patient tolerated treatment/procedure well without complication and is now ready for discharge.    DISPOSITION: Home or Self Care    FINAL DIAGNOSIS:  Carpal tunnel syndrome of right wrist    FOLLOWUP: In clinic    DISCHARGE INSTRUCTIONS:    Discharge Procedure Orders   Diet general     Activity as tolerated     Keep surgical extremity elevated     Lifting restrictions   Order Comments: Please limit lifting with the right arm and hand to 2-3 lb     Leave dressing on - Keep it clean, dry, and intact until clinic visit     Call MD for:  temperature >100.4     Call MD for:  persistent nausea and vomiting     Call MD for:  severe uncontrolled pain     Call MD for:  difficulty breathing, headache or visual disturbances     Call MD for:  redness, tenderness, or signs of infection (pain, swelling, redness, odor or green/yellow discharge around incision site)     Call MD for:  hives     Call MD for:  persistent dizziness or light-headedness     Call MD for:  extreme fatigue        TIME SPENT ON DISCHARGE: 15 minutes

## 2025-07-23 ENCOUNTER — OFFICE VISIT (OUTPATIENT)
Dept: ORTHOPEDICS | Facility: CLINIC | Age: 65
End: 2025-07-23
Payer: COMMERCIAL

## 2025-07-23 DIAGNOSIS — G56.01 CARPAL TUNNEL SYNDROME OF RIGHT WRIST: Primary | ICD-10-CM

## 2025-07-23 PROCEDURE — 99999 PR PBB SHADOW E&M-EST. PATIENT-LVL II: CPT | Mod: PBBFAC,,, | Performed by: ORTHOPAEDIC SURGERY

## 2025-07-23 PROCEDURE — 99024 POSTOP FOLLOW-UP VISIT: CPT | Mod: S$GLB,,, | Performed by: ORTHOPAEDIC SURGERY

## 2025-07-23 PROCEDURE — 1159F MED LIST DOCD IN RCRD: CPT | Mod: CPTII,S$GLB,, | Performed by: ORTHOPAEDIC SURGERY

## 2025-07-23 NOTE — PROGRESS NOTES
Efra Corey is a 65 y.o. male who is approximately 2 weeks status post right carpal tunnel release. He is doing very well. He states that the majority of carpal tunnel symptoms are improved or resolved.    Physical exam: Examination of the right hand reveals that the incision is healing well. There is no significant edema,  erythema or drainage. Sensation is grossly intact median radial and ulnar distributions. Motor function of the thenar musculature is intact. Capillary refill less than 2 seconds in all of the digits. The Patient is able to make a full composite fist and fully extend the fingers without significant pain.    Assessment: Status post right carpal tunnel release    Plan:    1. Sutures were removed today and Steri-Strips are placed    2. Can begin hand washing in running water tomorrow    3. Continue a 2-3 pound weight limit to the arm and hand    4. Follow up with me in 2 weeks for repeat evaluation

## 2025-08-13 ENCOUNTER — OFFICE VISIT (OUTPATIENT)
Dept: ORTHOPEDICS | Facility: CLINIC | Age: 65
End: 2025-08-13
Payer: COMMERCIAL

## 2025-08-13 DIAGNOSIS — Z98.890 STATUS POST CARPAL TUNNEL RELEASE: Primary | ICD-10-CM

## 2025-08-13 PROCEDURE — 99999 PR PBB SHADOW E&M-EST. PATIENT-LVL III: CPT | Mod: PBBFAC,,, | Performed by: PHYSICIAN ASSISTANT

## 2025-08-21 ENCOUNTER — OFFICE VISIT (OUTPATIENT)
Dept: FAMILY MEDICINE | Facility: CLINIC | Age: 65
End: 2025-08-21
Payer: COMMERCIAL

## 2025-08-21 ENCOUNTER — LAB VISIT (OUTPATIENT)
Dept: LAB | Facility: HOSPITAL | Age: 65
End: 2025-08-21
Attending: FAMILY MEDICINE
Payer: COMMERCIAL

## 2025-08-21 VITALS
HEIGHT: 66 IN | WEIGHT: 192.88 LBS | OXYGEN SATURATION: 95 % | HEART RATE: 73 BPM | SYSTOLIC BLOOD PRESSURE: 138 MMHG | DIASTOLIC BLOOD PRESSURE: 80 MMHG | BODY MASS INDEX: 31 KG/M2

## 2025-08-21 DIAGNOSIS — Z00.00 PREVENTATIVE HEALTH CARE: Primary | ICD-10-CM

## 2025-08-21 DIAGNOSIS — Z00.00 PREVENTATIVE HEALTH CARE: ICD-10-CM

## 2025-08-21 PROBLEM — Z73.6 LIMITATION OF ACTIVITY DUE TO DISABILITY: Status: RESOLVED | Noted: 2024-12-16 | Resolved: 2025-08-21

## 2025-08-21 PROBLEM — G56.02 CARPAL TUNNEL SYNDROME OF LEFT WRIST: Status: RESOLVED | Noted: 2025-05-08 | Resolved: 2025-08-21

## 2025-08-21 PROBLEM — G56.01 CARPAL TUNNEL SYNDROME OF RIGHT WRIST: Status: RESOLVED | Noted: 2025-07-08 | Resolved: 2025-08-21

## 2025-08-21 PROBLEM — M17.12 PRIMARY OSTEOARTHRITIS OF LEFT KNEE: Status: RESOLVED | Noted: 2024-12-16 | Resolved: 2025-08-21

## 2025-08-21 LAB
ABSOLUTE EOSINOPHIL (OHS): 0.39 K/UL
ABSOLUTE MONOCYTE (OHS): 0.58 K/UL (ref 0.3–1)
ABSOLUTE NEUTROPHIL COUNT (OHS): 4.03 K/UL (ref 1.8–7.7)
ALBUMIN SERPL BCP-MCNC: 4.4 G/DL (ref 3.5–5.2)
ALP SERPL-CCNC: 64 UNIT/L (ref 40–150)
ALT SERPL W/O P-5'-P-CCNC: 18 UNIT/L (ref 0–55)
ANION GAP (OHS): 10 MMOL/L (ref 8–16)
AST SERPL-CCNC: 27 UNIT/L (ref 0–50)
BASOPHILS # BLD AUTO: 0.08 K/UL
BASOPHILS NFR BLD AUTO: 1.2 %
BILIRUB SERPL-MCNC: 0.6 MG/DL (ref 0.1–1)
BUN SERPL-MCNC: 21 MG/DL (ref 8–23)
CALCIUM SERPL-MCNC: 9.6 MG/DL (ref 8.7–10.5)
CHLORIDE SERPL-SCNC: 106 MMOL/L (ref 95–110)
CHOLEST SERPL-MCNC: 204 MG/DL (ref 120–199)
CHOLEST/HDLC SERPL: 4.3 {RATIO} (ref 2–5)
CO2 SERPL-SCNC: 24 MMOL/L (ref 23–29)
CREAT SERPL-MCNC: 1.4 MG/DL (ref 0.5–1.4)
EAG (OHS): 108 MG/DL (ref 68–131)
ERYTHROCYTE [DISTWIDTH] IN BLOOD BY AUTOMATED COUNT: 13.1 % (ref 11.5–14.5)
GFR SERPLBLD CREATININE-BSD FMLA CKD-EPI: 56 ML/MIN/1.73/M2
GLUCOSE SERPL-MCNC: 86 MG/DL (ref 70–110)
HBA1C MFR BLD: 5.4 % (ref 4–5.6)
HCT VFR BLD AUTO: 47.1 % (ref 40–54)
HDLC SERPL-MCNC: 47 MG/DL (ref 40–75)
HDLC SERPL: 23 % (ref 20–50)
HGB BLD-MCNC: 15.8 GM/DL (ref 14–18)
IMM GRANULOCYTES # BLD AUTO: 0.02 K/UL (ref 0–0.04)
IMM GRANULOCYTES NFR BLD AUTO: 0.3 % (ref 0–0.5)
LDLC SERPL CALC-MCNC: 141.8 MG/DL (ref 63–159)
LYMPHOCYTES # BLD AUTO: 1.81 K/UL (ref 1–4.8)
MCH RBC QN AUTO: 30 PG (ref 27–31)
MCHC RBC AUTO-ENTMCNC: 33.5 G/DL (ref 32–36)
MCV RBC AUTO: 89 FL (ref 82–98)
NONHDLC SERPL-MCNC: 157 MG/DL
NUCLEATED RBC (/100WBC) (OHS): 0 /100 WBC
PLATELET # BLD AUTO: 202 K/UL (ref 150–450)
PMV BLD AUTO: 10.6 FL (ref 9.2–12.9)
POTASSIUM SERPL-SCNC: 5.2 MMOL/L (ref 3.5–5.1)
PROT SERPL-MCNC: 7.6 GM/DL (ref 6–8.4)
PSA SERPL-MCNC: 1.14 NG/ML
RBC # BLD AUTO: 5.27 M/UL (ref 4.6–6.2)
RELATIVE EOSINOPHIL (OHS): 5.6 %
RELATIVE LYMPHOCYTE (OHS): 26.2 % (ref 18–48)
RELATIVE MONOCYTE (OHS): 8.4 % (ref 4–15)
RELATIVE NEUTROPHIL (OHS): 58.3 % (ref 38–73)
SODIUM SERPL-SCNC: 140 MMOL/L (ref 136–145)
TRIGL SERPL-MCNC: 76 MG/DL (ref 30–150)
WBC # BLD AUTO: 6.91 K/UL (ref 3.9–12.7)

## 2025-08-21 PROCEDURE — 84153 ASSAY OF PSA TOTAL: CPT

## 2025-08-21 PROCEDURE — 3079F DIAST BP 80-89 MM HG: CPT | Mod: CPTII,S$GLB,, | Performed by: FAMILY MEDICINE

## 2025-08-21 PROCEDURE — 80061 LIPID PANEL: CPT

## 2025-08-21 PROCEDURE — 3008F BODY MASS INDEX DOCD: CPT | Mod: CPTII,S$GLB,, | Performed by: FAMILY MEDICINE

## 2025-08-21 PROCEDURE — 1160F RVW MEDS BY RX/DR IN RCRD: CPT | Mod: CPTII,S$GLB,, | Performed by: FAMILY MEDICINE

## 2025-08-21 PROCEDURE — 80053 COMPREHEN METABOLIC PANEL: CPT

## 2025-08-21 PROCEDURE — 99396 PREV VISIT EST AGE 40-64: CPT | Mod: S$GLB,,, | Performed by: FAMILY MEDICINE

## 2025-08-21 PROCEDURE — 99999 PR PBB SHADOW E&M-EST. PATIENT-LVL III: CPT | Mod: PBBFAC,,, | Performed by: FAMILY MEDICINE

## 2025-08-21 PROCEDURE — 3075F SYST BP GE 130 - 139MM HG: CPT | Mod: CPTII,S$GLB,, | Performed by: FAMILY MEDICINE

## 2025-08-21 PROCEDURE — 85025 COMPLETE CBC W/AUTO DIFF WBC: CPT

## 2025-08-21 PROCEDURE — 36415 COLL VENOUS BLD VENIPUNCTURE: CPT | Mod: PO

## 2025-08-21 PROCEDURE — 83036 HEMOGLOBIN GLYCOSYLATED A1C: CPT

## 2025-08-21 PROCEDURE — 1159F MED LIST DOCD IN RCRD: CPT | Mod: CPTII,S$GLB,, | Performed by: FAMILY MEDICINE

## 2025-08-27 ENCOUNTER — PATIENT MESSAGE (OUTPATIENT)
Dept: FAMILY MEDICINE | Facility: CLINIC | Age: 65
End: 2025-08-27
Payer: COMMERCIAL

## (undated) DEVICE — ALCOHOL 70% ISOP RUBBING 4OZ

## (undated) DEVICE — GLOVE PROTEXIS LTX MICRO  7.5

## (undated) DEVICE — GLOVE SENSICARE PI ALOE 8

## (undated) DEVICE — BLADE MEDIUM 9MM X 25MM

## (undated) DEVICE — HANDLE SURG LIGHT NONRIGID

## (undated) DEVICE — SUT ETHILON 3-0 FS-1 30

## (undated) DEVICE — Device

## (undated) DEVICE — ELECTRODE REM PLYHSV RETURN 9

## (undated) DEVICE — DRAPE HALF SURGICAL 40X58IN

## (undated) DEVICE — SEE MEDLINE ITEM 157131

## (undated) DEVICE — SUT ETHILON 4-0 PS2 18 BLK

## (undated) DEVICE — BIT DRILL 2.0MM CMP FT CALIB

## (undated) DEVICE — SEE L#120831

## (undated) DEVICE — DRAPE STERI-DRAPE 1000 17X11IN

## (undated) DEVICE — GLOVE BIOGEL ECLIPSE SZ 7.5

## (undated) DEVICE — DRESSING XEROFORM NONADH 1X8IN

## (undated) DEVICE — IMPLANTABLE DEVICE
Type: IMPLANTABLE DEVICE | Site: FOOT | Status: NON-FUNCTIONAL
Removed: 2021-07-06

## (undated) DEVICE — DRAPE U SPLIT SHEET 54X76IN

## (undated) DEVICE — CORD BIPOLAR 12 FOOT

## (undated) DEVICE — FORCEP STRAIGHT DISP

## (undated) DEVICE — KIT SAHARA DRAPE DRAW/LIFT

## (undated) DEVICE — SPONGE COTTON TRAY 4X4IN

## (undated) DEVICE — SYR 10CC LUER LOCK

## (undated) DEVICE — STRAP OR TABLE 5IN X 72IN

## (undated) DEVICE — SUT 3-0 VICRYL / SH (J416)

## (undated) DEVICE — STOCKINET 4INX48

## (undated) DEVICE — NDL 27G X 1 1/4

## (undated) DEVICE — DRESSING XEROFORM FOIL PK 1X8

## (undated) DEVICE — DRAPE THREE-QTR REINF 53X77IN

## (undated) DEVICE — SEE MEDLINE ITEM 146308

## (undated) DEVICE — APPLICATOR CHLORAPREP ORN 26ML

## (undated) DEVICE — PAD CAST SPECIALIST STRL 3

## (undated) DEVICE — REAMER PHALANGEAL 22MM

## (undated) DEVICE — BOWL STERILE LARGE 32OZ

## (undated) DEVICE — BANDAGE ELAS SOFTWRAP ST 3X5YD

## (undated) DEVICE — GAUZE SPONGE 4X4 12PLY

## (undated) DEVICE — SPONGE DERMACEA GAUZE 4X4

## (undated) DEVICE — BANDAGE ESMARK LATEX FREE 4INX

## (undated) DEVICE — BAND RUBBER STERILE 1/4X3.5IN

## (undated) DEVICE — SEE MEDLINE ITEM 146298

## (undated) DEVICE — BLADE SURG #15 CARBON STEEL

## (undated) DEVICE — REAMER METATARSAL 22MM

## (undated) DEVICE — GLOVE PROTEXIS LTX MICRO 8

## (undated) DEVICE — PROFILE DRILL MICRO CMP FT

## (undated) DEVICE — TOURNIQUET SB QC DP 18X4IN

## (undated) DEVICE — DRAPE HAND STERILE

## (undated) DEVICE — APPLICATOR CHLORAPREP CLR 10.5

## (undated) DEVICE — NDL HYPO REG 25G X 1 1/2

## (undated) DEVICE — SEE MEDLINE ITEM 157128